# Patient Record
Sex: MALE | Race: OTHER | NOT HISPANIC OR LATINO | Employment: FULL TIME | URBAN - METROPOLITAN AREA
[De-identification: names, ages, dates, MRNs, and addresses within clinical notes are randomized per-mention and may not be internally consistent; named-entity substitution may affect disease eponyms.]

---

## 2023-09-11 ENCOUNTER — HOSPITAL ENCOUNTER (INPATIENT)
Facility: HOSPITAL | Age: 63
LOS: 9 days | Discharge: HOME | DRG: 280 | End: 2023-09-20
Attending: EMERGENCY MEDICINE | Admitting: INTERNAL MEDICINE

## 2023-09-11 DIAGNOSIS — R79.89 POSITIVE D DIMER: ICD-10-CM

## 2023-09-11 DIAGNOSIS — R07.9 CHEST PAIN: ICD-10-CM

## 2023-09-11 DIAGNOSIS — N18.6 ESRD (END STAGE RENAL DISEASE) ON DIALYSIS: ICD-10-CM

## 2023-09-11 DIAGNOSIS — I16.1 HYPERTENSIVE EMERGENCY: ICD-10-CM

## 2023-09-11 DIAGNOSIS — N17.9 AKI (ACUTE KIDNEY INJURY): Primary | ICD-10-CM

## 2023-09-11 DIAGNOSIS — Z99.2 ESRD (END STAGE RENAL DISEASE) ON DIALYSIS: ICD-10-CM

## 2023-09-11 DIAGNOSIS — I50.9 CHF (CONGESTIVE HEART FAILURE): ICD-10-CM

## 2023-09-11 DIAGNOSIS — R06.02 SOB (SHORTNESS OF BREATH): ICD-10-CM

## 2023-09-11 LAB
A-ADO2 BLOOD GAS (OHS): 55 MMHG
ALBUMIN SERPL-MCNC: 2.6 G/DL (ref 3.4–4.8)
ALBUMIN SERPL-MCNC: 3.1 G/DL (ref 3.4–4.8)
ALBUMIN/GLOB SERPL: 0.8 RATIO (ref 1.1–2)
ALBUMIN/GLOB SERPL: 0.8 RATIO (ref 1.1–2)
ALLENS TEST BLOOD GAS (OHS): YES
ALP SERPL-CCNC: 58 UNIT/L (ref 40–150)
ALP SERPL-CCNC: 68 UNIT/L (ref 40–150)
ALT SERPL-CCNC: 37 UNIT/L (ref 0–55)
ALT SERPL-CCNC: 48 UNIT/L (ref 0–55)
AMPHET UR QL SCN: NEGATIVE
APPEARANCE UR: CLEAR
APTT PPP: 31.3 SECONDS (ref 23.2–33.7)
AST SERPL-CCNC: 24 UNIT/L (ref 5–34)
AST SERPL-CCNC: 37 UNIT/L (ref 5–34)
AV INDEX (PROSTH): 0.64
AV MEAN GRADIENT: 5 MMHG
AV PEAK GRADIENT: 10 MMHG
AV VALVE AREA BY VELOCITY RATIO: 2.14 CM²
AV VALVE AREA: 1.88 CM²
AV VELOCITY RATIO: 0.73
BACTERIA #/AREA URNS AUTO: ABNORMAL /HPF
BARBITURATE SCN PRESENT UR: NEGATIVE
BASE EXCESS BLD CALC-SCNC: -9.6 MMOL/L (ref -2–2)
BASOPHILS # BLD AUTO: 0.02 X10(3)/MCL
BASOPHILS # BLD AUTO: 0.04 X10(3)/MCL
BASOPHILS NFR BLD AUTO: 0.3 %
BASOPHILS NFR BLD AUTO: 0.5 %
BENZODIAZ UR QL SCN: NEGATIVE
BILIRUB SERPL-MCNC: 0.4 MG/DL
BILIRUB SERPL-MCNC: 0.4 MG/DL
BILIRUB UR QL STRIP.AUTO: NEGATIVE
BLOOD GAS SAMPLE TYPE (OHS): ABNORMAL
BNP BLD-MCNC: 1656.2 PG/ML
BSA FOR ECHO PROCEDURE: 1.5 M2
BUN SERPL-MCNC: 102.3 MG/DL (ref 8.4–25.7)
BUN SERPL-MCNC: 104 MG/DL (ref 8.4–25.7)
C3 SERPL-MCNC: 88 MG/DL (ref 80–173)
C4 SERPL-MCNC: 38 MG/DL (ref 13–46)
CALCIUM SERPL-MCNC: 7.5 MG/DL (ref 8.8–10)
CALCIUM SERPL-MCNC: 7.8 MG/DL (ref 8.8–10)
CANNABINOIDS UR QL SCN: NEGATIVE
CHLORIDE SERPL-SCNC: 108 MMOL/L (ref 98–107)
CHLORIDE SERPL-SCNC: 108 MMOL/L (ref 98–107)
CO2 BLDA-SCNC: 15.7 MMOL/L (ref 22–26)
CO2 SERPL-SCNC: 14 MMOL/L (ref 23–31)
CO2 SERPL-SCNC: 15 MMOL/L (ref 23–31)
COCAINE UR QL SCN: NEGATIVE
COHGB MFR BLDA: 1.6 % (ref 0.5–1.5)
COLOR UR: COLORLESS
CREAT SERPL-MCNC: 12.11 MG/DL (ref 0.73–1.18)
CREAT SERPL-MCNC: 12.27 MG/DL (ref 0.73–1.18)
CREAT UR-MCNC: 64.8 MG/DL (ref 63–166)
CV ECHO LV RWT: 0.43 CM
D DIMER PPP IA.FEU-MCNC: 1.83 UG/ML FEU (ref 0–0.5)
DOP CALC AO PEAK VEL: 1.6 M/S
DOP CALC AO VTI: 28 CM
DOP CALC LVOT AREA: 2.9 CM2
DOP CALC LVOT DIAMETER: 1.93 CM
DOP CALC LVOT PEAK VEL: 1.17 M/S
DOP CALC LVOT STROKE VOLUME: 52.63 CM3
DOP CALC MV VTI: 17.2 CM
DOP CALCLVOT PEAK VEL VTI: 18 CM
DRAWN BY BLOOD GAS (OHS): ABNORMAL
E WAVE DECELERATION TIME: 65.17 MSEC
E/A RATIO: 1
E/E' RATIO: 17.86 M/S
ECHO LV POSTERIOR WALL: 0.99 CM (ref 0.6–1.1)
EOSINOPHIL # BLD AUTO: 0.01 X10(3)/MCL (ref 0–0.9)
EOSINOPHIL # BLD AUTO: 0.03 X10(3)/MCL (ref 0–0.9)
EOSINOPHIL NFR BLD AUTO: 0.1 %
EOSINOPHIL NFR BLD AUTO: 0.5 %
ERYTHROCYTE [DISTWIDTH] IN BLOOD BY AUTOMATED COUNT: 14.6 % (ref 11.5–17)
ERYTHROCYTE [DISTWIDTH] IN BLOOD BY AUTOMATED COUNT: 14.6 % (ref 11.5–17)
FENTANYL UR QL SCN: NEGATIVE
FERRITIN SERPL-MCNC: 487.55 NG/ML (ref 21.81–274.66)
FIO2 BLOOD GAS (OHS): 21 %
FLUAV AG UPPER RESP QL IA.RAPID: NOT DETECTED
FLUBV AG UPPER RESP QL IA.RAPID: NOT DETECTED
FRACTIONAL SHORTENING: 18 % (ref 28–44)
GFR SERPLBLD CREATININE-BSD FMLA CKD-EPI: 4 MLS/MIN/1.73/M2
GFR SERPLBLD CREATININE-BSD FMLA CKD-EPI: 4 MLS/MIN/1.73/M2
GLOBULIN SER-MCNC: 3.4 GM/DL (ref 2.4–3.5)
GLOBULIN SER-MCNC: 4 GM/DL (ref 2.4–3.5)
GLUCOSE SERPL-MCNC: 106 MG/DL (ref 82–115)
GLUCOSE SERPL-MCNC: 116 MG/DL (ref 82–115)
GLUCOSE UR QL STRIP.AUTO: ABNORMAL
HAPTOGLOB SERPL-MCNC: 204 MG/DL (ref 40–368)
HCO3 BLDA-SCNC: 14.9 MMOL/L (ref 22–26)
HCT VFR BLD AUTO: 28.6 % (ref 42–52)
HCT VFR BLD AUTO: 31.4 % (ref 42–52)
HGB BLD-MCNC: 9.1 G/DL (ref 14–18)
HGB BLD-MCNC: 9.8 G/DL (ref 14–18)
HYALINE CASTS #/AREA URNS LPF: ABNORMAL /LPF
IMM GRANULOCYTES # BLD AUTO: 0.02 X10(3)/MCL (ref 0–0.04)
IMM GRANULOCYTES # BLD AUTO: 0.03 X10(3)/MCL (ref 0–0.04)
IMM GRANULOCYTES NFR BLD AUTO: 0.3 %
IMM GRANULOCYTES NFR BLD AUTO: 0.4 %
INR PPP: 1.1
INTERVENTRICULAR SEPTUM: 1.11 CM (ref 0.6–1.1)
IRON SATN MFR SERPL: 32 % (ref 20–50)
IRON SERPL-MCNC: 63 UG/DL (ref 65–175)
KETONES UR QL STRIP.AUTO: NEGATIVE
LACTATE SERPL-SCNC: 0.9 MMOL/L (ref 0.5–2.2)
LEFT ATRIUM SIZE: 4.66 CM
LEFT INTERNAL DIMENSION IN SYSTOLE: 3.79 CM (ref 2.1–4)
LEFT VENTRICLE DIASTOLIC VOLUME INDEX: 66.42 ML/M2
LEFT VENTRICLE DIASTOLIC VOLUME: 99.63 ML
LEFT VENTRICLE MASS INDEX: 115 G/M2
LEFT VENTRICLE SYSTOLIC VOLUME INDEX: 40.9 ML/M2
LEFT VENTRICLE SYSTOLIC VOLUME: 61.38 ML
LEFT VENTRICULAR INTERNAL DIMENSION IN DIASTOLE: 4.65 CM (ref 3.5–6)
LEFT VENTRICULAR MASS: 172.83 G
LEUKOCYTE ESTERASE UR QL STRIP.AUTO: NEGATIVE
LV LATERAL E/E' RATIO: 15.63 M/S
LV SEPTAL E/E' RATIO: 20.83 M/S
LVOT MG: 3.29 MMHG
LVOT MV: 0.88 CM/S
LYMPHOCYTES # BLD AUTO: 0.98 X10(3)/MCL (ref 0.6–4.6)
LYMPHOCYTES # BLD AUTO: 1.06 X10(3)/MCL (ref 0.6–4.6)
LYMPHOCYTES NFR BLD AUTO: 11.8 %
LYMPHOCYTES NFR BLD AUTO: 15.9 %
MAGNESIUM SERPL-MCNC: 1.9 MG/DL (ref 1.6–2.6)
MCH RBC QN AUTO: 24.1 PG (ref 27–31)
MCH RBC QN AUTO: 24.4 PG (ref 27–31)
MCHC RBC AUTO-ENTMCNC: 31.2 G/DL (ref 33–36)
MCHC RBC AUTO-ENTMCNC: 31.8 G/DL (ref 33–36)
MCV RBC AUTO: 76.7 FL (ref 80–94)
MCV RBC AUTO: 77.3 FL (ref 80–94)
MDMA UR QL SCN: NEGATIVE
METHGB MFR BLDA: 0.3 % (ref 0–1.5)
MONOCYTES # BLD AUTO: 0.42 X10(3)/MCL (ref 0.1–1.3)
MONOCYTES # BLD AUTO: 0.43 X10(3)/MCL (ref 0.1–1.3)
MONOCYTES NFR BLD AUTO: 5 %
MONOCYTES NFR BLD AUTO: 6.5 %
MV MEAN GRADIENT: 4 MMHG
MV PEAK A VEL: 1.25 M/S
MV PEAK E VEL: 1.25 M/S
MV PEAK GRADIENT: 6 MMHG
MV STENOSIS PRESSURE HALF TIME: 18.9 MS
MV VALVE AREA BY CONTINUITY EQUATION: 3.06 CM2
MV VALVE AREA P 1/2 METHOD: 11.64 CM2
NEUTROPHILS # BLD AUTO: 5.09 X10(3)/MCL (ref 2.1–9.2)
NEUTROPHILS # BLD AUTO: 6.86 X10(3)/MCL (ref 2.1–9.2)
NEUTROPHILS NFR BLD AUTO: 76.5 %
NEUTROPHILS NFR BLD AUTO: 82.2 %
NITRITE UR QL STRIP.AUTO: NEGATIVE
NRBC BLD AUTO-RTO: 0 %
NRBC BLD AUTO-RTO: 0 %
O2 HB BLOOD GAS (OHS): 91.6 % (ref 94–100)
OHS LV EJECTION FRACTION SIMPSONS BIPLANE MOD: 45 %
OPIATES UR QL SCN: NEGATIVE
OXYHGB MFR BLDA: 8.7 G/DL (ref 12–18)
PAO2 BLOOD GAS (OHS): 116 MMHG
PCO2 BLDA: 27 MMHG (ref 35–45)
PCP UR QL: NEGATIVE
PH BLDA: 7.35 [PH] (ref 7.35–7.45)
PH UR STRIP.AUTO: 6 [PH]
PH UR: 6 [PH] (ref 3–11)
PHOSPHATE SERPL-MCNC: 7.1 MG/DL (ref 2.3–4.7)
PISA TR MAX VEL: 4.35 M/S
PLATELET # BLD AUTO: 196 X10(3)/MCL (ref 130–400)
PLATELET # BLD AUTO: 229 X10(3)/MCL (ref 130–400)
PMV BLD AUTO: 9.4 FL (ref 7.4–10.4)
PMV BLD AUTO: 9.6 FL (ref 7.4–10.4)
PO2 BLDA: 61 MMHG (ref 75–100)
POTASSIUM SERPL-SCNC: 5.1 MMOL/L (ref 3.5–5.1)
POTASSIUM SERPL-SCNC: 5.4 MMOL/L (ref 3.5–5.1)
PROT SERPL-MCNC: 6 GM/DL (ref 5.8–7.6)
PROT SERPL-MCNC: 7.1 GM/DL (ref 5.8–7.6)
PROT UR QL STRIP.AUTO: ABNORMAL
PROT UR STRIP-MCNC: 407.8 MG/DL
PROTHROMBIN TIME: 13.5 SECONDS (ref 11.4–14)
RA PRESSURE ESTIMATED: 3 MMHG
RBC # BLD AUTO: 3.73 X10(6)/MCL (ref 4.7–6.1)
RBC # BLD AUTO: 4.06 X10(6)/MCL (ref 4.7–6.1)
RBC #/AREA URNS AUTO: ABNORMAL /HPF
RBC UR QL AUTO: ABNORMAL
RET# (OHS): 0.05 (ref 0.03–0.1)
RETICULOCYTE COUNT AUTOMATED (OLG): 1.16 % (ref 1.1–2.1)
RIGHT VENTRICULAR END-DIASTOLIC DIMENSION: 2.33 CM
RV TB RVSP: 7 MMHG
SAMPLE SITE BLOOD GAS (OHS): ABNORMAL
SAO2 % BLDA: 93.5 %
SARS-COV-2 RNA RESP QL NAA+PROBE: NOT DETECTED
SODIUM SERPL-SCNC: 137 MMOL/L (ref 136–145)
SODIUM SERPL-SCNC: 139 MMOL/L (ref 136–145)
SODIUM UR-SCNC: 51 MMOL/L
SP GR UR STRIP.AUTO: 1.01 (ref 1–1.03)
SQUAMOUS #/AREA URNS LPF: ABNORMAL /HPF
TDI LATERAL: 0.08 M/S
TDI SEPTAL: 0.06 M/S
TDI: 0.07 M/S
TIBC SERPL-MCNC: 134 UG/DL (ref 69–240)
TIBC SERPL-MCNC: 197 UG/DL (ref 250–450)
TR MAX PG: 76 MMHG
TRANSFERRIN SERPL-MCNC: 167 MG/DL (ref 174–364)
TROPONIN I SERPL-MCNC: 0.3 NG/ML (ref 0–0.04)
TROPONIN I SERPL-MCNC: 0.3 NG/ML (ref 0–0.04)
TSH SERPL-ACNC: 1.82 UIU/ML (ref 0.35–4.94)
TV REST PULMONARY ARTERY PRESSURE: 79 MMHG
UNIDENT CRYS #/AREA URNS HPF: ABNORMAL /HPF
URATE SERPL-MCNC: 9.1 MG/DL (ref 3.5–7.2)
URINE PROTEIN/CREATININE RATIO (OHS): 6.3
UROBILINOGEN UR STRIP-ACNC: NORMAL
WBC # SPEC AUTO: 6.65 X10(3)/MCL (ref 4.5–11.5)
WBC # SPEC AUTO: 8.34 X10(3)/MCL (ref 4.5–11.5)
WBC #/AREA URNS AUTO: ABNORMAL /HPF
Z-SCORE OF LEFT VENTRICULAR DIMENSION IN END DIASTOLE: 0.48
Z-SCORE OF LEFT VENTRICULAR DIMENSION IN END SYSTOLE: 2.53

## 2023-09-11 PROCEDURE — 63600175 PHARM REV CODE 636 W HCPCS

## 2023-09-11 PROCEDURE — 25000003 PHARM REV CODE 250

## 2023-09-11 PROCEDURE — 86039 ANTINUCLEAR ANTIBODIES (ANA): CPT | Performed by: INTERNAL MEDICINE

## 2023-09-11 PROCEDURE — 84443 ASSAY THYROID STIM HORMONE: CPT

## 2023-09-11 PROCEDURE — 84550 ASSAY OF BLOOD/URIC ACID: CPT | Performed by: INTERNAL MEDICINE

## 2023-09-11 PROCEDURE — 99900035 HC TECH TIME PER 15 MIN (STAT)

## 2023-09-11 PROCEDURE — 85025 COMPLETE CBC W/AUTO DIFF WBC: CPT | Performed by: PHYSICIAN ASSISTANT

## 2023-09-11 PROCEDURE — 83735 ASSAY OF MAGNESIUM: CPT

## 2023-09-11 PROCEDURE — 0240U COVID/FLU A&B PCR: CPT | Performed by: PHYSICIAN ASSISTANT

## 2023-09-11 PROCEDURE — 20000000 HC ICU ROOM

## 2023-09-11 PROCEDURE — 36600 WITHDRAWAL OF ARTERIAL BLOOD: CPT

## 2023-09-11 PROCEDURE — 85610 PROTHROMBIN TIME: CPT

## 2023-09-11 PROCEDURE — 25000003 PHARM REV CODE 250: Performed by: STUDENT IN AN ORGANIZED HEALTH CARE EDUCATION/TRAINING PROGRAM

## 2023-09-11 PROCEDURE — 85025 COMPLETE CBC W/AUTO DIFF WBC: CPT

## 2023-09-11 PROCEDURE — 81001 URINALYSIS AUTO W/SCOPE: CPT | Performed by: INTERNAL MEDICINE

## 2023-09-11 PROCEDURE — 85730 THROMBOPLASTIN TIME PARTIAL: CPT

## 2023-09-11 PROCEDURE — 86036 ANCA SCREEN EACH ANTIBODY: CPT | Performed by: INTERNAL MEDICINE

## 2023-09-11 PROCEDURE — 83010 ASSAY OF HAPTOGLOBIN QUANT: CPT | Performed by: INTERNAL MEDICINE

## 2023-09-11 PROCEDURE — 85045 AUTOMATED RETICULOCYTE COUNT: CPT

## 2023-09-11 PROCEDURE — 82728 ASSAY OF FERRITIN: CPT

## 2023-09-11 PROCEDURE — 82570 ASSAY OF URINE CREATININE: CPT | Performed by: INTERNAL MEDICINE

## 2023-09-11 PROCEDURE — 85379 FIBRIN DEGRADATION QUANT: CPT | Performed by: PHYSICIAN ASSISTANT

## 2023-09-11 PROCEDURE — 83550 IRON BINDING TEST: CPT

## 2023-09-11 PROCEDURE — 25000003 PHARM REV CODE 250: Performed by: INTERNAL MEDICINE

## 2023-09-11 PROCEDURE — 83880 ASSAY OF NATRIURETIC PEPTIDE: CPT | Performed by: PHYSICIAN ASSISTANT

## 2023-09-11 PROCEDURE — 84484 ASSAY OF TROPONIN QUANT: CPT | Performed by: PHYSICIAN ASSISTANT

## 2023-09-11 PROCEDURE — 82803 BLOOD GASES ANY COMBINATION: CPT

## 2023-09-11 PROCEDURE — 80053 COMPREHEN METABOLIC PANEL: CPT | Performed by: PHYSICIAN ASSISTANT

## 2023-09-11 PROCEDURE — 80307 DRUG TEST PRSMV CHEM ANLYZR: CPT | Performed by: PHYSICIAN ASSISTANT

## 2023-09-11 PROCEDURE — 93005 ELECTROCARDIOGRAM TRACING: CPT

## 2023-09-11 PROCEDURE — 80053 COMPREHEN METABOLIC PANEL: CPT | Performed by: INTERNAL MEDICINE

## 2023-09-11 PROCEDURE — 84100 ASSAY OF PHOSPHORUS: CPT

## 2023-09-11 PROCEDURE — 84300 ASSAY OF URINE SODIUM: CPT | Performed by: INTERNAL MEDICINE

## 2023-09-11 PROCEDURE — 86160 COMPLEMENT ANTIGEN: CPT | Performed by: INTERNAL MEDICINE

## 2023-09-11 PROCEDURE — 85730 THROMBOPLASTIN TIME PARTIAL: CPT | Performed by: INTERNAL MEDICINE

## 2023-09-11 PROCEDURE — 84484 ASSAY OF TROPONIN QUANT: CPT

## 2023-09-11 PROCEDURE — S5010 5% DEXTROSE AND 0.45% SALINE: HCPCS

## 2023-09-11 PROCEDURE — 99285 EMERGENCY DEPT VISIT HI MDM: CPT | Mod: 25

## 2023-09-11 PROCEDURE — 25000003 PHARM REV CODE 250: Performed by: EMERGENCY MEDICINE

## 2023-09-11 PROCEDURE — 25000003 PHARM REV CODE 250: Performed by: PHYSICIAN ASSISTANT

## 2023-09-11 PROCEDURE — 83605 ASSAY OF LACTIC ACID: CPT

## 2023-09-11 RX ORDER — IBUPROFEN 200 MG
24 TABLET ORAL
Status: DISCONTINUED | OUTPATIENT
Start: 2023-09-11 | End: 2023-09-20 | Stop reason: HOSPADM

## 2023-09-11 RX ORDER — SODIUM CHLORIDE 0.9 % (FLUSH) 0.9 %
10 SYRINGE (ML) INJECTION EVERY 12 HOURS PRN
Status: DISCONTINUED | OUTPATIENT
Start: 2023-09-11 | End: 2023-09-20 | Stop reason: HOSPADM

## 2023-09-11 RX ORDER — CARVEDILOL 3.12 MG/1
6.25 TABLET ORAL 2 TIMES DAILY
Status: DISCONTINUED | OUTPATIENT
Start: 2023-09-11 | End: 2023-09-11

## 2023-09-11 RX ORDER — SUCRALFATE 1 G/10ML
1 SUSPENSION ORAL EVERY 6 HOURS
Status: DISCONTINUED | OUTPATIENT
Start: 2023-09-11 | End: 2023-09-11

## 2023-09-11 RX ORDER — IBUPROFEN 200 MG
16 TABLET ORAL
Status: DISCONTINUED | OUTPATIENT
Start: 2023-09-11 | End: 2023-09-20 | Stop reason: HOSPADM

## 2023-09-11 RX ORDER — GLUCAGON 1 MG
1 KIT INJECTION
Status: DISCONTINUED | OUTPATIENT
Start: 2023-09-11 | End: 2023-09-20 | Stop reason: HOSPADM

## 2023-09-11 RX ORDER — ASPIRIN 325 MG
325 TABLET ORAL
Status: COMPLETED | OUTPATIENT
Start: 2023-09-11 | End: 2023-09-11

## 2023-09-11 RX ORDER — NAPROXEN SODIUM 220 MG/1
81 TABLET, FILM COATED ORAL DAILY
Status: DISCONTINUED | OUTPATIENT
Start: 2023-09-12 | End: 2023-09-20 | Stop reason: HOSPADM

## 2023-09-11 RX ORDER — MAG HYDROX/ALUMINUM HYD/SIMETH 200-200-20
30 SUSPENSION, ORAL (FINAL DOSE FORM) ORAL
Status: DISCONTINUED | OUTPATIENT
Start: 2023-09-11 | End: 2023-09-11

## 2023-09-11 RX ORDER — MUPIROCIN 20 MG/G
OINTMENT TOPICAL 2 TIMES DAILY
Status: DISPENSED | OUTPATIENT
Start: 2023-09-11 | End: 2023-09-16

## 2023-09-11 RX ORDER — NALOXONE HCL 0.4 MG/ML
0.02 VIAL (ML) INJECTION
Status: DISCONTINUED | OUTPATIENT
Start: 2023-09-11 | End: 2023-09-20 | Stop reason: HOSPADM

## 2023-09-11 RX ORDER — CARVEDILOL 12.5 MG/1
12.5 TABLET ORAL 2 TIMES DAILY
Status: DISCONTINUED | OUTPATIENT
Start: 2023-09-11 | End: 2023-09-13

## 2023-09-11 RX ORDER — HEPARIN SODIUM,PORCINE/D5W 25000/250
0-40 INTRAVENOUS SOLUTION INTRAVENOUS CONTINUOUS
Status: DISCONTINUED | OUTPATIENT
Start: 2023-09-11 | End: 2023-09-12

## 2023-09-11 RX ORDER — NICARDIPINE HYDROCHLORIDE 0.2 MG/ML
0-15 INJECTION INTRAVENOUS CONTINUOUS
Status: DISCONTINUED | OUTPATIENT
Start: 2023-09-11 | End: 2023-09-13

## 2023-09-11 RX ORDER — PANTOPRAZOLE SODIUM 40 MG/1
40 TABLET, DELAYED RELEASE ORAL NIGHTLY
Status: DISCONTINUED | OUTPATIENT
Start: 2023-09-11 | End: 2023-09-20 | Stop reason: HOSPADM

## 2023-09-11 RX ORDER — NITROGLYCERIN 20 MG/100ML
0-400 INJECTION INTRAVENOUS CONTINUOUS
Status: DISCONTINUED | OUTPATIENT
Start: 2023-09-11 | End: 2023-09-11

## 2023-09-11 RX ADMIN — SODIUM ZIRCONIUM CYCLOSILICATE 10 G: 10 POWDER, FOR SUSPENSION ORAL at 03:09

## 2023-09-11 RX ADMIN — NICARDIPINE HYDROCHLORIDE 5 MG/HR: 0.2 INJECTION, SOLUTION INTRAVENOUS at 09:09

## 2023-09-11 RX ADMIN — PANTOPRAZOLE SODIUM 40 MG: 40 TABLET, DELAYED RELEASE ORAL at 09:09

## 2023-09-11 RX ADMIN — HEPARIN SODIUM 12 UNITS/KG/HR: 10000 INJECTION, SOLUTION INTRAVENOUS at 05:09

## 2023-09-11 RX ADMIN — NITROGLYCERIN 1 INCH: 20 OINTMENT TOPICAL at 01:09

## 2023-09-11 RX ADMIN — SODIUM BICARBONATE: 84 INJECTION, SOLUTION INTRAVENOUS at 04:09

## 2023-09-11 RX ADMIN — ASPIRIN 325 MG ORAL TABLET 325 MG: 325 PILL ORAL at 01:09

## 2023-09-11 RX ADMIN — NITROGLYCERIN 10 MCG/MIN: 20 INJECTION INTRAVENOUS at 04:09

## 2023-09-11 RX ADMIN — MUPIROCIN: 20 OINTMENT TOPICAL at 09:09

## 2023-09-11 NOTE — CONSULTS
Nephrology Consultation    Date of Consultation: 9/11/2023    Consultation Requested By: Austin Giraldo DO    Reason for Consultation: Acute renal failure    History of Present Illness:  This is a 62 y.o. male with no known past medical history, who presents to the ED with progressively worsening shortness of breath. It has been worsening over the past few days with chest pain. Endorses orthopnea with sputum production. Denies any dysuria/hematuria, and produces adequate non-tea colored urine. Denies taking any nephrotoxic agents or any illicit drug use. Nephrology consulted for acute renal failure with BUN/Cr 102/12.3.    Review of patient's allergies indicates:  No Known Allergies    Review of systems: 12 point review of systems conducted, negative except as stated in the HPI.     Past Medical History: History reviewed. No pertinent past medical history.    Procedure History: History reviewed. No pertinent surgical history.    Family History: family history is not on file.    Social History:  reports that he has never smoked. He has never used smokeless tobacco. He reports that he does not drink alcohol and does not use drugs.    Home Medications:   (Not in a hospital admission)      Inpatient Medications:     Current Facility-Administered Medications:     nitroGLYCERIN in 5 % dextrose 50 mg/250 mL (200 mcg/mL) infusion, 0-400 mcg/min, Intravenous, Continuous, Austin Giraldo DO    sodium bicarbonate 100 mEq in dextrose 5 % and 0.45 % NaCl 1,000 mL infusion, , Intravenous, Continuous, Austin Giraldo DO  No current outpatient medications on file.     Laboratory Data:   Recent Results (from the past 24 hour(s))   Comprehensive Metabolic Panel    Collection Time: 09/11/23 11:49 AM   Result Value Ref Range    Sodium Level 137 136 - 145 mmol/L    Potassium Level 5.4 (H) 3.5 - 5.1 mmol/L    Chloride 108 (H) 98 - 107 mmol/L    Carbon Dioxide 14 (L) 23 - 31 mmol/L    Glucose Level 106 82 - 115 mg/dL    Blood Urea Nitrogen  102.3 (H) 8.4 - 25.7 mg/dL    Creatinine 12.11 (H) 0.73 - 1.18 mg/dL    Calcium Level Total 7.8 (L) 8.8 - 10.0 mg/dL    Protein Total 7.1 5.8 - 7.6 gm/dL    Albumin Level 3.1 (L) 3.4 - 4.8 g/dL    Globulin 4.0 (H) 2.4 - 3.5 gm/dL    Albumin/Globulin Ratio 0.8 (L) 1.1 - 2.0 ratio    Bilirubin Total 0.4 <=1.5 mg/dL    Alkaline Phosphatase 68 40 - 150 unit/L    Alanine Aminotransferase 48 0 - 55 unit/L    Aspartate Aminotransferase 37 (H) 5 - 34 unit/L    eGFR 4 mls/min/1.73/m2   BNP    Collection Time: 09/11/23 11:49 AM   Result Value Ref Range    Natriuretic Peptide 1,656.2 (H) <=100.0 pg/mL   Troponin I    Collection Time: 09/11/23 11:49 AM   Result Value Ref Range    Troponin-I 0.298 (H) 0.000 - 0.045 ng/mL   CBC with Differential    Collection Time: 09/11/23 11:49 AM   Result Value Ref Range    WBC 8.34 4.50 - 11.50 x10(3)/mcL    RBC 4.06 (L) 4.70 - 6.10 x10(6)/mcL    Hgb 9.8 (L) 14.0 - 18.0 g/dL    Hct 31.4 (L) 42.0 - 52.0 %    MCV 77.3 (L) 80.0 - 94.0 fL    MCH 24.1 (L) 27.0 - 31.0 pg    MCHC 31.2 (L) 33.0 - 36.0 g/dL    RDW 14.6 11.5 - 17.0 %    Platelet 229 130 - 400 x10(3)/mcL    MPV 9.4 7.4 - 10.4 fL    Neut % 82.2 %    Lymph % 11.8 %    Mono % 5.0 %    Eos % 0.1 %    Basophil % 0.5 %    Lymph # 0.98 0.6 - 4.6 x10(3)/mcL    Neut # 6.86 2.1 - 9.2 x10(3)/mcL    Mono # 0.42 0.1 - 1.3 x10(3)/mcL    Eos # 0.01 0 - 0.9 x10(3)/mcL    Baso # 0.04 <=0.2 x10(3)/mcL    IG# 0.03 0 - 0.04 x10(3)/mcL    IG% 0.4 %    NRBC% 0.0 %   D dimer, quantitative    Collection Time: 09/11/23 11:49 AM   Result Value Ref Range    D-Dimer 1.83 (H) 0.00 - 0.50 ug/mL FEU   Reticulocytes    Collection Time: 09/11/23 11:49 AM   Result Value Ref Range    Retic Cnt Auto 1.16 1.1 - 2.1 %    RET# 0.0478 0.026 - 0.095   Ferritin    Collection Time: 09/11/23 11:49 AM   Result Value Ref Range    Ferritin Level 487.55 (H) 21.81 - 274.66 ng/mL   Iron and TIBC    Collection Time: 09/11/23 11:49 AM   Result Value Ref Range    Iron Binding Capacity  "Unsaturated 134 69 - 240 ug/dL    Iron Level 63 (L) 65 - 175 ug/dL    Transferrin 167 (L) 174 - 364 mg/dL    Iron Binding Capacity Total 197 (L) 250 - 450 ug/dL    Iron Saturation 32 20 - 50 %   Drug Screen, Urine    Collection Time: 09/11/23  2:21 PM   Result Value Ref Range    Amphetamines, Urine Negative Negative    Barbituates, Urine Negative Negative    Benzodiazepine, Urine Negative Negative    Cannabinoids, Urine Negative Negative    Cocaine, Urine Negative Negative    Fentanyl, Urine Negative Negative    MDMA, Urine Negative Negative    Opiates, Urine Negative Negative    Phencyclidine, Urine Negative Negative    pH, Urine 6.0 3.0 - 11.0       Imaging:  X-Ray Chest 1 View   Final Result      CT Chest Without Contrast    (Results Pending)   US Doppler Renal Artery and Vein (xpd)    (Results Pending)   US Retroperitoneal Complete    (Results Pending)       Physical Exam:   BP (!) 222/128   Pulse 97   Temp 97.7 °F (36.5 °C) (Temporal)   Resp 20   Ht 5' 2" (1.575 m)   Wt 51.7 kg (113 lb 15.7 oz)   SpO2 96%   BMI 20.85 kg/m²  Body mass index is 20.85 kg/m².  Physical Exam  Vitals reviewed.   Constitutional:       General: He is in acute distress.      Appearance: He is not ill-appearing or toxic-appearing.   HENT:      Mouth/Throat:      Mouth: Mucous membranes are moist.   Eyes:      Extraocular Movements: Extraocular movements intact.   Cardiovascular:      Rate and Rhythm: Tachycardia present.      Pulses: Normal pulses.      Heart sounds: Normal heart sounds. No murmur heard.     No friction rub. No gallop.   Pulmonary:      Breath sounds: Normal breath sounds.      Comments: tachypneic  Abdominal:      General: There is no distension.      Palpations: Abdomen is soft. There is no mass.      Tenderness: There is no abdominal tenderness.   Musculoskeletal:         General: Normal range of motion.      Right lower leg: No edema.      Left lower leg: No edema.   Skin:     General: Skin is warm and dry.    " "  Capillary Refill: Capillary refill takes less than 2 seconds.      Coloration: Skin is not jaundiced or pale.      Findings: No bruising.   Neurological:      General: No focal deficit present.      Mental Status: He is alert.   Psychiatric:         Mood and Affect: Mood normal.         Behavior: Behavior normal.         Thought Content: Thought content normal.         Judgment: Judgment normal.           Impression/Plan   Nonoliguric Acute renal failure   Anion gap metabolic acidosis  -Without prior kidney disease, likely has above. Now has   -He will need a full workup due to no known history of kidney disease. Will need UA, Urine Protein/Cr ratio, Urine sodium, Urine osmoles, JAMES/ANCA, C3/C4, ANCA, SPEP, UPEP.   -Recommend renal US artery/vein for secondary workup of hypertension  -Patient requested medical management at this time. Recommend giving fluids 1/2 NS + 2 amps bicarb @ 100 cc/hr. Will repeat bmp @ 7 PM to look for improvement. If renal indices do not improve or patient becomes fluid overloaded, patient will need HD, which he was agreeable to. Please contact Dr. Hyman if emergent dialysis is needed.    3.   Hypertensive emergency  4.   Congestive heart failure  5.   NSTEMI  -Rest of management per primary team      Thank you very much for your consultation. Nephrology will continue to follow. Please do not hesitate to reach out with any questions/concerns.    Attending attestation to follow.    Eliezer Avila DO  [facility] Internal Medicine, PGY-II  [facility] Nephrology    Addendum:  Patient will likely require hemodialysis due to pulmonary edema and degree of acidosis and azotemia.  May need labetalol for hypertensive emergency. Patient on nitroglycerin gtt. Cardene gtt also an option.Work up for BIA underway. Renal US-no hydronephrosis.    During our interview with patient earlier in ER, via , patient said he would rather "medication" than hemodialysis, but will consider Only if absolutely " necessary. He wants to talk to family. Explained risks vs benefits of hemodialysis and patient expressed understanding.  Brittany Hyman M.D.  Nephrology

## 2023-09-11 NOTE — CONSULTS
"Cardiology Consult Note     Cardiology Attending: Dr. Banuelos   Cardiology Fellow: Lizeth Daugherty DO     Date of Admit: 9/11/2023  Date of Consult: 9/11/2023    Reason for Consultation:     "NSTEMI"    History of Present Illness:      Ryann Ly is a 62 y.o. male with a PMH significant for HTN who presented to the [facility] ED with complaints of worsening SOB. Patient reports having progressively worsening SOB over the last week. He reports SOB is worse with exertion and lying flat. Reports significant orthopnea and PND. Patient also had an episode of nausea with this morning. He reports last being seen by a physician 1 year ago and states he had blood work at that time. He denies being told of any CKD. Cardiology has been consulted for NSTEMI.     Past Medical History:   HTN  Surgical History:   History reviewed. No pertinent surgical history.  Allergies:   Review of patient's allergies indicates:  No Known Allergies  Family History:   History reviewed. No pertinent family history.  Social History:     Social History     Tobacco Use    Smoking status: Never    Smokeless tobacco: Never   Substance Use Topics    Alcohol use: Never    Drug use: Never     Review of Systems:     Review of Systems   Constitutional:  Negative for chills, diaphoresis and fever.   HENT:  Negative for hearing loss and nosebleeds.    Eyes:  Negative for blurred vision.   Respiratory:  Positive for shortness of breath.    Cardiovascular:  Positive for orthopnea and PND. Negative for chest pain, palpitations and claudication.   Gastrointestinal:  Negative for nausea and vomiting.   Genitourinary:  Negative for dysuria.   Musculoskeletal:  Negative for myalgias.   Skin:  Negative for rash.   Neurological:  Negative for dizziness and headaches.     Medications:     Home Medications:  Prior to Admission medications    Not on File       Current/Inpatient Medications:  Infusions:   heparin (porcine) in D5W      nitroGLYCERIN 10 mcg/min (09/11/23 " "1609)    sodium bicarbonate 100 mEq in dextrose 5 % and 0.45 % NaCl 1,000 mL infusion 100 mL/hr at 09/11/23 1607     Scheduled:   [START ON 9/12/2023] aspirin  81 mg Oral Daily    heparin (PORCINE)  60 Units/kg (Adjusted) Intravenous Once    pantoprazole  40 mg Oral QHS     PRN:  dextrose 10%, dextrose 10%, glucagon (human recombinant), glucose, glucose, heparin (PORCINE), heparin (PORCINE), naloxone, sodium chloride 0.9%    Objective:     24-hour Vitals:   Temp:  [97.7 °F (36.5 °C)] 97.7 °F (36.5 °C)  Pulse:  [90-97] 93  Resp:  [20] 20  SpO2:  [96 %] 96 %  BP: (207-230)/(122-137) 213/125    Vitals: BP (!) 213/125   Pulse 93   Temp 97.7 °F (36.5 °C) (Temporal)   Resp 20   Ht 5' 2" (1.575 m)   Wt 51.3 kg (113 lb)   SpO2 96%   BMI 20.67 kg/m²   No intake or output data in the 24 hours ending 09/11/23 1702    Physical Exam  Constitutional:       Appearance: Normal appearance.   HENT:      Head: Normocephalic and atraumatic.   Eyes:      Conjunctiva/sclera: Conjunctivae normal.   Cardiovascular:      Rate and Rhythm: Normal rate and regular rhythm.      Pulses: Normal pulses.      Heart sounds: Normal heart sounds.   Pulmonary:      Effort: Pulmonary effort is normal. No respiratory distress.      Breath sounds: Normal breath sounds.   Abdominal:      General: Bowel sounds are normal. There is no distension.      Palpations: Abdomen is soft.   Musculoskeletal:      Right lower leg: No edema.      Left lower leg: No edema.   Skin:     General: Skin is warm and dry.   Neurological:      Mental Status: He is alert. Mental status is at baseline.   Psychiatric:         Mood and Affect: Mood normal.         Thought Content: Thought content normal.         Judgment: Judgment normal.        Labs:   I have reviewed the following labs below:    Recent Labs   Lab 09/11/23  1149 09/11/23  1555 09/11/23  1556   WBC 8.34  --  6.65   HGB 9.8*  --  9.1*   HCT 31.4*  --  28.6*     --  196     --   --    K 5.4*  --   " --    CO2 14*  --   --    .3*  --   --    CREATININE 12.11*  --   --    CALCIUM 7.8*  --   --    ALBUMIN 3.1*  --   --    BILITOT 0.4  --   --    AST 37*  --   --    ALT 48  --   --    ALKPHOS 68  --   --    PTT  --  31.3  --    INR  --  1.1  --    TROPONINI 0.298*  --   --    BNP 1,656.2*  --   --      Cardiovascular Studies/Imaging:   I have reviewed the following studies below:    TTE (9/11/2023):     Left Ventricle: The left ventricle is normal in size. Normal wall thickness. There is mildly reduced systolic function. Biplane (2D) method of discs ejection fraction is 45%.    There is mild  anteroseptal hypokinesis.    Left Atrium: Left atrium is mildly dilated.    Right Ventricle: Normal right ventricular cavity size. Systolic function is normal.    Right Atrium: Right atrium is mildly dilated.    Aortic Valve: The aortic valve is a trileaflet valve. There is normal leaflet mobility.    Mitral Valve: There is trace regurgitation.    Tricuspid Valve: There is mild to moderate regurgitation.    Pulmonary Artery: The estimated pulmonary artery systolic pressure is 79 mmHg.    IVC/SVC: Normal venous pressure at 3 mmHg.         Assessment & Plan:      NSTEMI   - likely demand ischemic    - continue trending troponins for now - although unreliable in the setting of acute renal failure   - unable to perform LHC in the setting of acute renal failure    - recommend stress nuclear once patient is euvolemic and able to lie flat     HFrEF   - EF 45% on echo today    - recommend diuresis to achieve euvolemia   - will need stress testing once euvolemic    - recommend initiating GDMT when transitioning off of HTN crisis protocol     Pulmonary HTN   - will need further workup to determine etiology   - recommend V/Q scan and outpatient sleep study    - if above testing is normal - will need outpatient RHC    Thank you for allowing us to participate in the care of this patient. We will continue to follow along.     Lizeth  SHONDA Daugherty DO  [facility] Cardiology Fellow, PGY-6  09/11/2023 5:02 PM

## 2023-09-11 NOTE — FIRST PROVIDER EVALUATION
"Medical screening examination initiated.  I have conducted a focused provider triage encounter, findings are as follows:    Brief history of present illness:  Elevated BP & SOB at night    Vitals:    09/11/23 1117   BP: (!) 230/137   BP Location: Right arm   Patient Position: Sitting   Pulse: 95   Resp: 20   Temp: 97.7 °F (36.5 °C)   TempSrc: Temporal   SpO2: 96%   Weight: 51.7 kg (113 lb 15.7 oz)   Height: 5' 2" (1.575 m)       Pertinent physical exam:  NAD, no LE edema    Brief workup plan:  labs, EKG, XR ordered    Preliminary workup initiated; this workup will be continued and followed by the physician or advanced practice provider that is assigned to the patient when roomed.  "

## 2023-09-11 NOTE — ED PROVIDER NOTES
Encounter Date: 9/11/2023      I, Michele Erazo MD, did conduct a face to face encounter with this patient initially seen by our advanced practice provider. I did perform a history and physical, did direct the evaluation and management, and do agree with the care as documented.       History     Chief Complaint   Patient presents with    Hypertension     C/o high blood pressure at night when he goes to bed and is unable to sleep because he gets sob. Bp at present 230/137. Need referral for pcp     Patient reports to the ER with complaints of elevated blood pressure readings at night with assoc sob and cp; pt denies any sob/cp symptoms during the day and denies any medical history requiring prescription meds    The history is provided by the patient.   Hypertension   This is a new problem. The current episode started several days ago. The problem has been waxing and waning. Associated symptoms include chest pain and shortness of breath. Pertinent negatives include no blurred vision and no peripheral edema.     Review of patient's allergies indicates:  No Known Allergies  History reviewed. No pertinent past medical history.  History reviewed. No pertinent surgical history.  History reviewed. No pertinent family history.  Social History     Tobacco Use    Smoking status: Never    Smokeless tobacco: Never   Substance Use Topics    Alcohol use: Never    Drug use: Never     Review of Systems   Constitutional:  Negative for fever.   HENT:  Negative for sore throat.    Eyes:  Negative for blurred vision.   Respiratory:  Positive for shortness of breath.    Cardiovascular:  Positive for chest pain.   Gastrointestinal:  Negative for nausea.   Genitourinary:  Negative for dysuria.   Musculoskeletal:  Negative for back pain.   Skin:  Negative for rash.   Neurological:  Negative for weakness.   Hematological:  Does not bruise/bleed easily.   Psychiatric/Behavioral: Negative.         Physical Exam     Initial Vitals  [09/11/23 1117]   BP Pulse Resp Temp SpO2   (!) 230/137 95 20 97.7 °F (36.5 °C) 96 %      MAP       --         Physical Exam    Vitals reviewed.  Constitutional: He appears well-developed and well-nourished.   HENT:   Head: Normocephalic and atraumatic.   Eyes: Conjunctivae and EOM are normal. Pupils are equal, round, and reactive to light.   Neck:   Normal range of motion.  Cardiovascular:  Normal rate, regular rhythm, normal heart sounds and intact distal pulses.           Pulmonary/Chest: He has rhonchi. He exhibits no tenderness.   Abdominal: Abdomen is soft. Bowel sounds are normal. He exhibits no distension. There is no abdominal tenderness.   Musculoskeletal:         General: Normal range of motion.      Cervical back: Normal range of motion.     Neurological: He is alert and oriented to person, place, and time. He displays normal reflexes. No cranial nerve deficit or sensory deficit. GCS score is 15. GCS eye subscore is 4. GCS verbal subscore is 5. GCS motor subscore is 6.   Skin: Skin is warm. No pallor.   Psychiatric: He has a normal mood and affect. His behavior is normal. Judgment and thought content normal.         ED Course   Procedures  Labs Reviewed   COMPREHENSIVE METABOLIC PANEL - Abnormal; Notable for the following components:       Result Value    Potassium Level 5.4 (*)     Chloride 108 (*)     Carbon Dioxide 14 (*)     Blood Urea Nitrogen 102.3 (*)     Creatinine 12.11 (*)     Calcium Level Total 7.8 (*)     Albumin Level 3.1 (*)     Globulin 4.0 (*)     Albumin/Globulin Ratio 0.8 (*)     Aspartate Aminotransferase 37 (*)     All other components within normal limits   B-TYPE NATRIURETIC PEPTIDE - Abnormal; Notable for the following components:    Natriuretic Peptide 1,656.2 (*)     All other components within normal limits   TROPONIN I - Abnormal; Notable for the following components:    Troponin-I 0.298 (*)     All other components within normal limits   CBC WITH DIFFERENTIAL - Abnormal;  Notable for the following components:    RBC 4.06 (*)     Hgb 9.8 (*)     Hct 31.4 (*)     MCV 77.3 (*)     MCH 24.1 (*)     MCHC 31.2 (*)     All other components within normal limits   D DIMER, QUANTITATIVE - Abnormal; Notable for the following components:    D-Dimer 1.83 (*)     All other components within normal limits   IRON AND TIBC - Abnormal; Notable for the following components:    Iron Level 63 (*)     Transferrin 167 (*)     Iron Binding Capacity Total 197 (*)     All other components within normal limits   RETICULOCYTES - Normal   CBC W/ AUTO DIFFERENTIAL    Narrative:     The following orders were created for panel order CBC Auto Differential.  Procedure                               Abnormality         Status                     ---------                               -----------         ------                     CBC with Differential[[id number]]       Abnormal            Final result                 Please view results for these tests on the individual orders.   EXTRA TUBES    Narrative:     The following orders were created for panel order EXTRA TUBES.  Procedure                               Abnormality         Status                     ---------                               -----------         ------                     Gold Top Hold[[id number]]                                   In process                 Pink Top Hold[[id number]]                                   In process                   Please view results for these tests on the individual orders.   GOLD TOP HOLD   PINK TOP HOLD   COVID/FLU A&B PCR   DRUG SCREEN, URINE (BEAKER)   FERRITIN        ECG Results              EKG 12-lead (In process)  Result time 09/11/23 11:37:46      In process by Interface, Lab In [facility] (09/11/23 11:37:46)                   Narrative:    Test Reason : R06.02,    Vent. Rate : 094 BPM     Atrial Rate : 094 BPM     P-R Int : 150 ms          QRS Dur : 138 ms      QT Int : 418 ms       P-R-T Axes : 635 -63 648  degrees     QTc Int : 522 ms    Normal sinus rhythm  Left axis deviation  Nonspecific intraventricular block  T wave abnormality, consider lateral ischemia  Abnormal ECG  No previous ECGs available    Referred By:             Confirmed By:                                   Imaging Results              X-Ray Chest 1 View (Final result)  Result time 09/11/23 13:33:20      Final result by Malcom Castro MD (09/11/23 13:33:20)                   Narrative:    EXAMINATION  XR CHEST 1 VIEW    CLINICAL HISTORY  sob;    TECHNIQUE  A total of 1 frontal image(s) of the chest.    COMPARISON  None available at the time of initial interpretation.    FINDINGS  Lines/tubes/devices: none present    The cardiomediastinal silhouette and central pulmonary vasculature are unremarkable for utilized technique.  The trachea is midline.  Ill-defined right perihilar and basilar infiltrate noted, with no organized lobar consolidation identified.  Left lung field is clear.  There is no large pleural effusion or convincing pneumothorax.    There is no acute osseous or extrathoracic abnormality.    IMPRESSION  Ill-defined right perihilar/basilar infiltrate may represent developing atypical infectious process.      Electronically signed by: Malcom Castro  Date:    09/11/2023  Time:    13:33                                     Medications   nitroGLYCERIN 2% TD oint ointment 1 inch (1 inch Topical (Top) Given 9/11/23 1343)   aspirin tablet 325 mg (325 mg Oral Given 9/11/23 1343)     Medical Decision Making  Amount and/or Complexity of Data Reviewed  Radiology: ordered.    Risk  OTC drugs.  Prescription drug management.  Decision regarding hospitalization.                               Clinical Impression:   Final diagnoses:  [R06.02] SOB (shortness of breath)  [I16.1] Hypertensive emergency  [N17.9] BIA (acute kidney injury) (Primary)        ED Disposition Condition    Observation Stable                Michele Erazo MD  09/17/23 0700

## 2023-09-12 LAB
ALBUMIN SERPL-MCNC: 2.6 G/DL (ref 3.4–4.8)
ALBUMIN/GLOB SERPL: 0.8 RATIO (ref 1.1–2)
ALP SERPL-CCNC: 53 UNIT/L (ref 40–150)
ALT SERPL-CCNC: 30 UNIT/L (ref 0–55)
APTT PPP: 48.7 SECONDS (ref 23.2–33.7)
APTT PPP: 67.9 SECONDS
APTT PPP: 75.6 SECONDS (ref 23.2–33.7)
AST SERPL-CCNC: 19 UNIT/L (ref 5–34)
BASOPHILS # BLD AUTO: 0.05 X10(3)/MCL
BASOPHILS NFR BLD AUTO: 0.6 %
BILIRUB SERPL-MCNC: 0.4 MG/DL
BUN SERPL-MCNC: 101.9 MG/DL (ref 8.4–25.7)
CALCIUM SERPL-MCNC: 7.1 MG/DL (ref 8.8–10)
CHLORIDE SERPL-SCNC: 109 MMOL/L (ref 98–107)
CHOLEST SERPL-MCNC: 166 MG/DL
CHOLEST/HDLC SERPL: 4 {RATIO} (ref 0–5)
CK SERPL-CCNC: 395 U/L (ref 30–200)
CO2 SERPL-SCNC: 18 MMOL/L (ref 23–31)
CREAT SERPL-MCNC: 12.05 MG/DL (ref 0.73–1.18)
EOSINOPHIL # BLD AUTO: 0.17 X10(3)/MCL (ref 0–0.9)
EOSINOPHIL NFR BLD AUTO: 2.2 %
ERYTHROCYTE [DISTWIDTH] IN BLOOD BY AUTOMATED COUNT: 14.4 % (ref 11.5–17)
EST. AVERAGE GLUCOSE BLD GHB EST-MCNC: 114 MG/DL
GFR SERPLBLD CREATININE-BSD FMLA CKD-EPI: 4 MLS/MIN/1.73/M2
GLOBULIN SER-MCNC: 3.3 GM/DL (ref 2.4–3.5)
GLUCOSE SERPL-MCNC: 102 MG/DL (ref 82–115)
HBA1C MFR BLD: 5.6 %
HBV SURFACE AB SER-ACNC: 0.33 MIU/ML
HBV SURFACE AB SERPL IA-ACNC: NONREACTIVE M[IU]/ML
HBV SURFACE AG SERPL QL IA: NONREACTIVE
HCT VFR BLD AUTO: 25.9 % (ref 42–52)
HDLC SERPL-MCNC: 43 MG/DL (ref 35–60)
HGB BLD-MCNC: 8.3 G/DL (ref 14–18)
IMM GRANULOCYTES # BLD AUTO: 0.02 X10(3)/MCL (ref 0–0.04)
IMM GRANULOCYTES NFR BLD AUTO: 0.3 %
LDLC SERPL CALC-MCNC: 114 MG/DL (ref 50–140)
LYMPHOCYTES # BLD AUTO: 1.91 X10(3)/MCL (ref 0.6–4.6)
LYMPHOCYTES NFR BLD AUTO: 24.6 %
MAGNESIUM SERPL-MCNC: 1.9 MG/DL (ref 1.6–2.6)
MCH RBC QN AUTO: 23.9 PG (ref 27–31)
MCHC RBC AUTO-ENTMCNC: 32 G/DL (ref 33–36)
MCV RBC AUTO: 74.4 FL (ref 80–94)
MONOCYTES # BLD AUTO: 0.63 X10(3)/MCL (ref 0.1–1.3)
MONOCYTES NFR BLD AUTO: 8.1 %
NEUTROPHILS # BLD AUTO: 4.99 X10(3)/MCL (ref 2.1–9.2)
NEUTROPHILS NFR BLD AUTO: 64.2 %
NRBC BLD AUTO-RTO: 0 %
PHOSPHATE SERPL-MCNC: 6.9 MG/DL (ref 2.3–4.7)
PLATELET # BLD AUTO: 203 X10(3)/MCL (ref 130–400)
PMV BLD AUTO: 9.4 FL (ref 7.4–10.4)
POTASSIUM SERPL-SCNC: 4.5 MMOL/L (ref 3.5–5.1)
PROT SERPL-MCNC: 5.9 GM/DL (ref 5.8–7.6)
RBC # BLD AUTO: 3.48 X10(6)/MCL (ref 4.7–6.1)
SODIUM SERPL-SCNC: 141 MMOL/L (ref 136–145)
TRIGL SERPL-MCNC: 47 MG/DL (ref 34–140)
TROPONIN I SERPL-MCNC: 0.23 NG/ML (ref 0–0.04)
TROPONIN I SERPL-MCNC: 0.26 NG/ML (ref 0–0.04)
VLDLC SERPL CALC-MCNC: 9 MG/DL
WBC # SPEC AUTO: 7.77 X10(3)/MCL (ref 4.5–11.5)

## 2023-09-12 PROCEDURE — 84100 ASSAY OF PHOSPHORUS: CPT

## 2023-09-12 PROCEDURE — 85025 COMPLETE CBC W/AUTO DIFF WBC: CPT

## 2023-09-12 PROCEDURE — 25000003 PHARM REV CODE 250: Performed by: STUDENT IN AN ORGANIZED HEALTH CARE EDUCATION/TRAINING PROGRAM

## 2023-09-12 PROCEDURE — 87340 HEPATITIS B SURFACE AG IA: CPT | Performed by: INTERNAL MEDICINE

## 2023-09-12 PROCEDURE — S5010 5% DEXTROSE AND 0.45% SALINE: HCPCS

## 2023-09-12 PROCEDURE — 25000003 PHARM REV CODE 250

## 2023-09-12 PROCEDURE — 86706 HEP B SURFACE ANTIBODY: CPT | Performed by: INTERNAL MEDICINE

## 2023-09-12 PROCEDURE — 25500020 PHARM REV CODE 255

## 2023-09-12 PROCEDURE — 82550 ASSAY OF CK (CPK): CPT | Performed by: INTERNAL MEDICINE

## 2023-09-12 PROCEDURE — 84484 ASSAY OF TROPONIN QUANT: CPT

## 2023-09-12 PROCEDURE — 21400001 HC TELEMETRY ROOM

## 2023-09-12 PROCEDURE — 63600175 PHARM REV CODE 636 W HCPCS: Performed by: STUDENT IN AN ORGANIZED HEALTH CARE EDUCATION/TRAINING PROGRAM

## 2023-09-12 PROCEDURE — 83735 ASSAY OF MAGNESIUM: CPT

## 2023-09-12 PROCEDURE — 25000003 PHARM REV CODE 250: Performed by: INTERNAL MEDICINE

## 2023-09-12 PROCEDURE — 83036 HEMOGLOBIN GLYCOSYLATED A1C: CPT

## 2023-09-12 PROCEDURE — 80100014 HC HEMODIALYSIS 1:1

## 2023-09-12 PROCEDURE — 80061 LIPID PANEL: CPT

## 2023-09-12 PROCEDURE — 80053 COMPREHEN METABOLIC PANEL: CPT

## 2023-09-12 PROCEDURE — 85730 THROMBOPLASTIN TIME PARTIAL: CPT | Performed by: INTERNAL MEDICINE

## 2023-09-12 PROCEDURE — 63600175 PHARM REV CODE 636 W HCPCS

## 2023-09-12 RX ORDER — HYDRALAZINE HYDROCHLORIDE 20 MG/ML
10 INJECTION INTRAMUSCULAR; INTRAVENOUS
Status: DISCONTINUED | OUTPATIENT
Start: 2023-09-12 | End: 2023-09-20 | Stop reason: HOSPADM

## 2023-09-12 RX ORDER — LABETALOL HCL 20 MG/4 ML
10 SYRINGE (ML) INTRAVENOUS
Status: DISCONTINUED | OUTPATIENT
Start: 2023-09-12 | End: 2023-09-20 | Stop reason: HOSPADM

## 2023-09-12 RX ORDER — DIPHENHYDRAMINE HCL 25 MG
25 CAPSULE ORAL ONCE
Status: COMPLETED | OUTPATIENT
Start: 2023-09-12 | End: 2023-09-12

## 2023-09-12 RX ORDER — CARVEDILOL 3.12 MG/1
6.25 TABLET ORAL 2 TIMES DAILY
Status: DISCONTINUED | OUTPATIENT
Start: 2023-09-12 | End: 2023-09-12

## 2023-09-12 RX ORDER — SODIUM CHLORIDE 9 MG/ML
INJECTION, SOLUTION INTRAVENOUS ONCE
Status: CANCELLED | OUTPATIENT
Start: 2023-09-12 | End: 2023-09-12

## 2023-09-12 RX ADMIN — ISOSORBIDE DINITRATE: 20 TABLET ORAL at 10:09

## 2023-09-12 RX ADMIN — HYDRALAZINE HYDROCHLORIDE 10 MG: 20 INJECTION INTRAMUSCULAR; INTRAVENOUS at 05:09

## 2023-09-12 RX ADMIN — NICARDIPINE HYDROCHLORIDE 2.5 MG/HR: 0.2 INJECTION, SOLUTION INTRAVENOUS at 06:09

## 2023-09-12 RX ADMIN — MUPIROCIN: 20 OINTMENT TOPICAL at 08:09

## 2023-09-12 RX ADMIN — MUPIROCIN: 20 OINTMENT TOPICAL at 10:09

## 2023-09-12 RX ADMIN — HEPARIN SODIUM 15.09 UNITS/KG/HR: 10000 INJECTION, SOLUTION INTRAVENOUS at 03:09

## 2023-09-12 RX ADMIN — IOHEXOL 100 ML: 350 INJECTION, SOLUTION INTRAVENOUS at 06:09

## 2023-09-12 RX ADMIN — CARVEDILOL 12.5 MG: 12.5 TABLET, FILM COATED ORAL at 10:09

## 2023-09-12 RX ADMIN — HYDRALAZINE HYDROCHLORIDE 10 MG: 20 INJECTION INTRAMUSCULAR; INTRAVENOUS at 01:09

## 2023-09-12 RX ADMIN — CARVEDILOL 12.5 MG: 12.5 TABLET, FILM COATED ORAL at 08:09

## 2023-09-12 RX ADMIN — PANTOPRAZOLE SODIUM 40 MG: 40 TABLET, DELAYED RELEASE ORAL at 10:09

## 2023-09-12 RX ADMIN — ASPIRIN 81 MG CHEWABLE TABLET 81 MG: 81 TABLET CHEWABLE at 08:09

## 2023-09-12 RX ADMIN — SODIUM BICARBONATE: 84 INJECTION, SOLUTION INTRAVENOUS at 03:09

## 2023-09-12 RX ADMIN — DIPHENHYDRAMINE HYDROCHLORIDE 25 MG: 25 CAPSULE ORAL at 10:09

## 2023-09-12 NOTE — PROGRESS NOTES
Inpatient Nutrition Assessment    Admit Date: 9/11/2023   Total duration of encounter: 1 day     Nutrition Recommendation/Prescription     When appropriate ADAT to renal diet--60 gm protein  Order novasource renal bid when diet progressed ; nausea or vomiting   Consider phosphate binder /elevated P level  MVI/fe  Biweekly wt  Will monitor nutrition status     Communication of Recommendations: reviewed with nurse    Nutrition Assessment     Malnutrition Assessment/Nutrition-Focused Physical Exam    Malnutrition Context: acute illness or injury  Malnutrition Level:  (pt in testing during rounds; unable to complete PA /obtain diet hx at this time)                                                        A minimum of two characteristics is recommended for diagnosis of either severe or non-severe malnutrition.    Chart Review    Reason Seen: continuous nutrition monitoring    Malnutrition Screening Tool Results   Have you recently lost weight without trying?: No  Have you been eating poorly because of a decreased appetite?: No   MST Score: 0     Diagnosis:  Acute renal failure, metabolic acidosis, HTN, Chf, NSTEMI     Relevant Medical History: none    Nutrition-Related Medications: heparin, Na Bicarbonate , aspirin, pantoprazole   Calorie Containing IV Medications: no significant kcals from medications at this time    Nutrition-Related Labs:  (9-12) H/ 8.3/25.9(L) Gluc 102 Bun 101.9(H) Cr 12.0(H) GFR 4(L) K 4.5 P 6.9(H)     Diet/PN Order: Diet NPO  Oral Supplement Order: none  Tube Feeding Order: none  Appetite/Oral Intake: NPO/NPO  Factors Affecting Nutritional Intake: NPO  Food/Muslim/Cultural Preferences: unable to obtain  Food Allergies: unable to obtain       Wound(s):   none     Comments    (9/12) Pt having ultrasound during rounds; spoke to nurse caring for pt--pt to have multiple test today. Pt to have HD access placed; agreed to dialysis. Unable to obtain diet/wt hx at this time. Suggest renal diet/ oral  "supplement when able to advance diet. Abnormal labs noted: Bun/Cr (H)--reflective renal dysfunction. P (H)--suggest phosphate binder.     Anthropometrics    Height: 5' 2.01" (157.5 cm)    Last Weight: 51.2 kg (112 lb 14 oz) (23 0540) Weight Method: Bed Scale  BMI (Calculated): 20.6  BMI Classification: normal (BMI 18.5-24.9)        Ideal Body Weight (IBW), Male: 118.06 lb     % Ideal Body Weight, Male (lb): 95.71 %                 Usual Body Weight (UBW), kg:  (unable to obtain)        Usual Weight Provided By: EMR weight history    Wt Readings from Last 5 Encounters:   23 51.2 kg (112 lb 14 oz)     Weight Change(s) Since Admission:  Admit Weight: 51.7 kg (113 lb 15.7 oz) (23 1117)      Estimated Needs    Weight Used For Calorie Calculations: 51.2 kg (112 lb 14 oz)  Energy Calorie Requirements (kcal): 1536 kcal/d; 30 coby/kg  Energy Need Method: Kcal/kg  Weight Used For Protein Calculations: 51.2 kg (112 lb 14 oz)  Protein Requirements: 61 gm protein/d; 1.2 gm/kg ARF/ to begin HD  Fluid Requirements (mL): 1536 ml/d; 1ml/coby  Temp (24hrs), Av.4 °F (36.9 °C), Min:98 °F (36.7 °C), Max:99.2 °F (37.3 °C)       Enteral Nutrition    Patient not receiving enteral nutrition at this time.    Parenteral Nutrition    Patient not receiving parenteral nutrition support at this time.    Evaluation of Received Nutrient Intake    Calories: not meeting estimated needs  Protein: not meeting estimated needs    Patient Education    Not applicable.    Nutrition Diagnosis     PES: Inadequate oral intake related to acute illness as evidenced by NPO. (new)    Interventions/Goals     Intervention(s): modified composition of meals/snacks, commercial beverage, multivitamin/mineral supplement therapy, and collaboration with other providers  Goal: Meet greater than 75% of nutritional needs by follow-up. (new)    Monitoring & Evaluation     Dietitian will monitor food and beverage intake, weight, and electrolyte/renal " panel.  Nutrition Risk/Follow-Up: moderate (follow-up in 3-5 days)   Please consult if re-assessment needed sooner.

## 2023-09-12 NOTE — PROGRESS NOTES
"Cardiology Consult Note     Cardiology Attending: Dr. Banuelos   Cardiology Fellow: Lizeth Daugherty DO     Date of Admit: 9/11/2023  Date of Consult: 9/12/2023    Reason for Consultation:     "NSTEMI"    History of Present Illness:      Ryann Ly is a 63 y.o. male with a PMH significant for HTN who presented to the [facility] ED with complaints of worsening SOB. Patient reports having progressively worsening SOB over the last week. He reports KAY and orthopnea. No history of CKD. Cardiology has been consulted for NSTEMI.     Interval History:  Patient seen and examined today. Reports improvement in SOB.     Review of Systems:     Negative except as above     Medications:     Home Medications:  Prior to Admission medications    Not on File       Current/Inpatient Medications:  Infusions:   heparin (porcine) in D5W 15.087 Units/kg/hr (09/12/23 1518)    nicardipine Stopped (09/12/23 0950)    sodium bicarbonate 100 mEq in dextrose 5 % and 0.45 % NaCl 1,000 mL infusion 100 mL/hr at 09/12/23 1514     Scheduled:   aspirin  81 mg Oral Daily    carvediloL  12.5 mg Oral BID    hydrALAZINE 10 mg 1 tablet, hydrALAZINE 25 mg 1 tablet, isorsorbide dinitrate 20 mg 1 tablet combination   Oral TID    mupirocin   Nasal BID    pantoprazole  40 mg Oral QHS     PRN:  dextrose 10%, dextrose 10%, glucagon (human recombinant), glucose, glucose, heparin (PORCINE), heparin (PORCINE), hydrALAZINE, labetalol, naloxone, sodium chloride 0.9%    Objective:     24-hour Vitals:   Temp:  [98 °F (36.7 °C)-99.2 °F (37.3 °C)] 98.2 °F (36.8 °C)  Pulse:  [73-98] 93  Resp:  [6-32] 22  SpO2:  [89 %-97 %] 94 %  BP: (134-198)/() 143/77    Vitals: BP (!) 143/77   Pulse 93   Temp 98.2 °F (36.8 °C)   Resp (!) 22   Ht 5' 2.01" (1.575 m)   Wt 51.2 kg (112 lb 14 oz)   SpO2 (!) 94%   BMI 20.64 kg/m²     Intake/Output Summary (Last 24 hours) at 9/12/2023 1645  Last data filed at 9/12/2023 1209  Gross per 24 hour   Intake 2463.08 ml   Output --   Net " 2463.08 ml       Physical Exam  Constitutional:       Appearance: Normal appearance.   HENT:      Head: Normocephalic and atraumatic.   Eyes:      Conjunctiva/sclera: Conjunctivae normal.   Cardiovascular:      Rate and Rhythm: Normal rate and regular rhythm.      Pulses: Normal pulses.      Heart sounds: Normal heart sounds.   Pulmonary:      Effort: Pulmonary effort is normal. No respiratory distress.      Breath sounds: Normal breath sounds.   Abdominal:      General: Bowel sounds are normal. There is no distension.      Palpations: Abdomen is soft.   Musculoskeletal:      Right lower leg: No edema.      Left lower leg: No edema.   Skin:     General: Skin is warm and dry.   Neurological:      Mental Status: He is alert. Mental status is at baseline.   Psychiatric:         Mood and Affect: Mood normal.         Thought Content: Thought content normal.         Judgment: Judgment normal.        Labs:   I have reviewed the following labs below:    Recent Labs   Lab 09/11/23  1149 09/11/23  1555 09/11/23  1556 09/11/23  1738 09/11/23  1850 09/11/23  2332 09/12/23  0602 09/12/23  0735   WBC 8.34  --  6.65  --   --   --  7.77  --    HGB 9.8*  --  9.1*  --   --   --  8.3*  --    HCT 31.4*  --  28.6*  --   --   --  25.9*  --      --  196  --   --   --  203  --      --   --   --  139  --  141  --    K 5.4*  --   --   --  5.1  --  4.5  --    CO2 14*  --   --   --  15*  --  18*  --    .3*  --   --   --  104.0*  --  101.9*  --    CREATININE 12.11*  --   --   --  12.27*  --  12.05*  --    CALCIUM 7.8*  --   --   --  7.5*  --  7.1*  --    MG  --   --   --  1.90  --   --  1.90  --    PHOS  --   --   --  7.1*  --   --  6.9*  --    ALBUMIN 3.1*  --   --   --  2.6*  --  2.6*  --    BILITOT 0.4  --   --   --  0.4  --  0.4  --    AST 37*  --   --   --  24  --  19  --    ALT 48  --   --   --  37  --  30  --    ALKPHOS 68  --   --   --  58  --  53  --    PTT  --  31.3  --   --   --  48.7*  --  75.6*   INR  --  1.1   --   --   --   --   --   --    TROPONINI 0.298*  --   --  0.304*  --  0.259* 0.228*  --    BNP 1,656.2*  --   --   --   --   --   --   --        Cardiovascular Studies/Imaging:   I have reviewed the following studies below:    TTE (9/11/2023):     Left Ventricle: The left ventricle is normal in size. Normal wall thickness. There is mildly reduced systolic function. Biplane (2D) method of discs ejection fraction is 45%.    There is mild  anteroseptal hypokinesis.    Left Atrium: Left atrium is mildly dilated.    Right Ventricle: Normal right ventricular cavity size. Systolic function is normal.    Right Atrium: Right atrium is mildly dilated.    Aortic Valve: The aortic valve is a trileaflet valve. There is normal leaflet mobility.    Mitral Valve: There is trace regurgitation.    Tricuspid Valve: There is mild to moderate regurgitation.    Pulmonary Artery: The estimated pulmonary artery systolic pressure is 79 mmHg.    IVC/SVC: Normal venous pressure at 3 mmHg.         Assessment & Plan:      NSTEMI   - likely demand ischemic    - troponin peaked at 0.3- although unreliable in the setting of acute renal failure   - unable to perform LHC in the setting of acute renal failure    - recommend stress nuclear     HFrEF   - EF 45% on echo this admission     - will need stress testing once euvolemic    - recommend initiating GDMT when transitioning off of HTN crisis protocol     Pulmonary HTN   - will need further workup to determine etiology   - recommend V/Q scan and outpatient sleep study    - if above testing is normal - will need outpatient RHC    Thank you for allowing us to participate in the care of this patient. We will continue to follow along.     Lizeth Daugherty DO  [facility] Cardiology Fellow, PGY-6  09/12/2023 5:02 PM

## 2023-09-12 NOTE — PLAN OF CARE
09/12/23 1154   Discharge Assessment   Assessment Type Discharge Planning Assessment   Confirmed/corrected address, phone number and insurance Yes   Confirmed Demographics Correct on Facesheet   Source of Information patient   Reason For Admission SOB (shortness of breath)   N17.9 BIA (acute kidney injury)  R07.9 Chest pain  I16.1 Hypertensive emergency   People in Home friend(s)   Facility Arrived From: Home   Do you expect to return to your current living situation? Yes   Do you have help at home or someone to help you manage your care at home? Yes   Who are your caregiver(s) and their phone number(s)? FriendRonni (Friend)   [phone]   Prior to hospitilization cognitive status: Alert/Oriented   Current cognitive status: Alert/Oriented   Equipment Currently Used at Home none   Readmission within 30 days? No   Patient currently being followed by outpatient case management? No   Do you currently have service(s) that help you manage your care at home? No   Do you take prescription medications? No   Do you have prescription coverage? No   Do you have any problems affording any of your prescribed medications? TBD   Who is going to help you get home at discharge? Friend   How do you get to doctors appointments? family or friend will provide   Are you on dialysis? No   DME Needed Upon Discharge  none   Discharge Plan discussed with: Patient   Transition of Care Barriers Unisured   Discharge Plan A Home   Physical Activity   On average, how many days per week do you engage in moderate to strenuous exercise (like a brisk walk)? 0 days   On average, how many minutes do you engage in exercise at this level? 0 min   Financial Resource Strain   How hard is it for you to pay for the very basics like food, housing, medical care, and heating? Not very   Housing Stability   In the last 12 months, was there a time when you were not able to pay the mortgage or rent on time? N   In the last 12 months, how many places have you  lived? 2   In the last 12 months, was there a time when you did not have a steady place to sleep or slept in a shelter (including now)? N   Transportation Needs   In the past 12 months, has lack of transportation kept you from medical appointments or from getting medications? no   In the past 12 months, has lack of transportation kept you from meetings, work, or from getting things needed for daily living? No   Food Insecurity   Within the past 12 months, you worried that your food would run out before you got the money to buy more. Never true   Stress   Do you feel stress - tense, restless, nervous, or anxious, or unable to sleep at night because your mind is troubled all the time - these days? Not at all   Social Connections   In a typical week, how many times do you talk on the phone with family, friends, or neighbors? More than 3   How often do you get together with friends or relatives? More than 3   Are you , , , , never , or living with a partner?    Alcohol Use   Q1: How often do you have a drink containing alcohol? Never   Q2: How many drinks containing alcohol do you have on a typical day when you are drinking? None   Q3: How often do you have six or more drinks on one occasion? Never     Pt  with 3 children who all reside in [place]; Pt lives with friend & emergency contact, Ronni; Employed at a [employer] in the kitchen; Home address updated in Epic; Pt has no insurance or SS #; CM to follow for d/c planning needs.

## 2023-09-12 NOTE — CARE UPDATE
Physician staffed by intensivist today, refer to H&P dated yesterday.     Updates to patient care today:  Had extensive conversation with the patient this morning about need for dialysis.  Patient was initially hesitant, but finally became agreeable to starting dialysis.  General surgery was consulted by the Nephrology team for placement of HD catheter.  Patient had a positive D-dimer, and evidence of rather significant pulmonary hypertension (without signs of right heart strain) on transthoracic echocardiogram.  Will obtain a CTA of the chest following HD catheter placement and patient will be dialyzed following CTA.  Nephrology is aware of our plan for a contrasted CT study.  If CTA is negative, okay to stop heparin drip, patient also had negative DVT ultrasound.    Patient's troponin has also started to downtrend.  He has been evaluated by Cardiology.  Troponin elevation was likely secondary to demand ischemia.  Ordered a Lexiscan to evaluate for inducible reversible ischemia.  However, priority at this time is to get the patient dialyzed.    Patient has also been weaned off his nicardipine infusion.  He was started on carvedilol 12.5 mg b.i.d. would ideally also start additional GDMT given his decreased ejection fraction, but with his acute renal failure, we cannot start ACE/ARB, MRA, SGLT2 inhibitor at his time.  With this limitation, and as patient is still hypertensive, will start Bidil as an alternative as it has been shown to be beneficial in specific populations with cardiomyopathy.  Can use hydralazine/labetalol IV push as needed.  He is stable for transfer to floor.

## 2023-09-12 NOTE — PROGRESS NOTES
"Nephrology Progress Note    Hospital course:   Patient admitted to ICU for hypertensive emergency placed on nitro drip.  Cardiology consulted for NSTEMI with troponins 0.304, now downtrended.  Overnight, patient placed on Cardene drip due to uncontrolled blood pressure.  Renal indices did not improve with administration of sodium bicarb drip.    Subjective:   No issues overnight blood pressure now better controlled with Cardene drip patient has no acute complaints this morning, denying chest pain and shortness of breath.  Renal indices did not improve, BUN/creatinine 101.9/12.05.  With only very slight improvement in renal indices, recommended patient to start hemodialysis at this time.  Patient understands renal indices did not improve with medical management and would benefit from HD. at this time, patient is agreeable to HD.    Objective:   /87   Pulse 75   Temp 98 °F (36.7 °C)   Resp (!) 21   Ht 5' 2.01" (1.575 m)   Wt 51.2 kg (112 lb 14 oz)   SpO2 (!) 93%   BMI 20.64 kg/m²     Intake/Output Summary (Last 24 hours) at 9/12/2023 1247  Last data filed at 9/12/2023 1209  Gross per 24 hour   Intake 2463.08 ml   Output --   Net 2463.08 ml        Physical exam:   Physical Exam  Constitutional:       General: He is not in acute distress.     Appearance: Normal appearance. He is not ill-appearing or toxic-appearing.   HENT:      Mouth/Throat:      Mouth: Mucous membranes are moist.   Eyes:      Extraocular Movements: Extraocular movements intact.   Cardiovascular:      Rate and Rhythm: Normal rate and regular rhythm.      Pulses: Normal pulses.      Heart sounds: Normal heart sounds. No murmur heard.     No friction rub. No gallop.   Pulmonary:      Effort: Pulmonary effort is normal. No respiratory distress.      Breath sounds: Normal breath sounds. No wheezing.   Abdominal:      General: Abdomen is flat. Bowel sounds are normal. There is no distension.      Palpations: Abdomen is soft. There is no mass. "      Tenderness: There is no abdominal tenderness.   Musculoskeletal:         General: Normal range of motion.      Right lower leg: Edema present.      Left lower leg: Edema present.      Comments: Trace pitting edema   Skin:     General: Skin is warm and dry.      Capillary Refill: Capillary refill takes less than 2 seconds.      Coloration: Skin is not jaundiced or pale.      Findings: No bruising.   Neurological:      General: No focal deficit present.      Mental Status: He is alert.   Psychiatric:         Mood and Affect: Mood normal.         Behavior: Behavior normal.         Thought Content: Thought content normal.         Judgment: Judgment normal.           Laboratory data:   Recent Results (from the past 24 hour(s))   COVID/FLU A&B PCR    Collection Time: 09/11/23  1:42 PM   Result Value Ref Range    Influenza A PCR Not Detected Not Detected    Influenza B PCR Not Detected Not Detected    SARS-CoV-2 PCR Not Detected Not Detected, Negative, Invalid   Drug Screen, Urine    Collection Time: 09/11/23  2:21 PM   Result Value Ref Range    Amphetamines, Urine Negative Negative    Barbituates, Urine Negative Negative    Benzodiazepine, Urine Negative Negative    Cannabinoids, Urine Negative Negative    Cocaine, Urine Negative Negative    Fentanyl, Urine Negative Negative    MDMA, Urine Negative Negative    Opiates, Urine Negative Negative    Phencyclidine, Urine Negative Negative    pH, Urine 6.0 3.0 - 11.0   Echo    Collection Time: 09/11/23  2:55 PM   Result Value Ref Range    BSA 1.5 m2    Nix's Biplane MOD Ejection Fraction 45 %    LVOT stroke volume 52.63 cm3    LVIDd 4.65 3.5 - 6.0 cm    LV Systolic Volume 61.38 mL    LV Systolic Volume Index 40.9 mL/m2    LVIDs 3.79 2.1 - 4.0 cm    LV Diastolic Volume 99.63 mL    LV Diastolic Volume Index 66.42 mL/m2    IVS 1.11 (A) 0.6 - 1.1 cm    LVOT diameter 1.93 cm    LVOT area 2.9 cm2    FS 18 (A) 28 - 44 %    Left Ventricle Relative Wall Thickness 0.43 cm     Posterior Wall 0.99 0.6 - 1.1 cm    LV mass 172.83 g    LV Mass Index 115 g/m2    MV Peak E Yon 1.25 m/s    TDI LATERAL 0.08 m/s    TDI SEPTAL 0.06 m/s    E/E' ratio 17.86 m/s    MV Peak A Yon 1.25 m/s    TR Max Yon 4.35 m/s    E/A ratio 1.00     E wave deceleration time 65.17 msec    LV SEPTAL E/E' RATIO 20.83 m/s    LV LATERAL E/E' RATIO 15.63 m/s    LVOT peak yon 1.17 m/s    Left Ventricular Outflow Tract Mean Velocity 0.88 cm/s    Left Ventricular Outflow Tract Mean Gradient 3.29 mmHg    LA size 4.66 cm    RVDD 2.33 cm    AV mean gradient 5 mmHg    AV peak gradient 10 mmHg    Ao peak yon 1.60 m/s    Ao VTI 28.00 cm    LVOT peak VTI 18.00 cm    AV valve area 1.88 cm²    AV Velocity Ratio 0.73     AV index (prosthetic) 0.64     SAVANNAH by Velocity Ratio 2.14 cm²    MV mean gradient 4 mmHg    MV peak gradient 6 mmHg    MV stenosis pressure 1/2 time 18.90 ms    MV valve area p 1/2 method 11.64 cm2    MV valve area by continuity eq 3.06 cm2    MV VTI 17.2 cm    Triscuspid Valve Regurgitation Peak Gradient 76 mmHg    Mean e' 0.07 m/s    ZLVIDS 2.53     ZLVIDD 0.48     TV resting pulmonary artery pressure 79 mmHg    RV TB RVSP 7 mmHg    Est. RA pres 3 mmHg   Protein/Creatinine Ratio, Urine    Collection Time: 09/11/23  3:46 PM   Result Value Ref Range    Urine Protein Level 407.8 mg/dL    Urine Creatinine 64.8 63.0 - 166.0 mg/dL    Urine Protein/Creatinine Ratio 6.3    Urinalysis, Reflex to Urine Culture    Collection Time: 09/11/23  3:46 PM    Specimen: Urine   Result Value Ref Range    Color, UA Colorless (A) Yellow, Light-Yellow, Dark Yellow, Maeve, Straw    Appearance, UA Clear Clear    Specific Gravity, UA 1.010 1.005 - 1.030    pH, UA 6.0 5.0 - 8.5    Protein, UA 3+ (A) Negative    Glucose, UA Trace (A) Negative, Normal    Ketones, UA Negative Negative    Blood, UA 1+ (A) Negative    Bilirubin, UA Negative Negative    Urobilinogen, UA Normal 0.2, 1.0, Normal    Nitrites, UA Negative Negative    Leukocyte Esterase, UA  Negative Negative    WBC, UA 0-5 None Seen, 0-2, 3-5, 0-5 /HPF    Bacteria, UA Trace (A) None Seen /HPF    Squamous Epithelial Cells, UA Trace (A) None Seen /HPF    Hyaline Casts, UA None Seen None Seen /lpf    Unclassified Crystal, UA Trace (A) None Seen /HPF    RBC, UA 0-5 None Seen, 0-2, 3-5, 0-5 /HPF   Sodium, Random Urine    Collection Time: 09/11/23  3:46 PM   Result Value Ref Range    Urine Sodium 51.0 mmol/L   APTT    Collection Time: 09/11/23  3:55 PM   Result Value Ref Range    PTT 31.3 23.2 - 33.7 seconds   Protime-INR    Collection Time: 09/11/23  3:55 PM   Result Value Ref Range    PT 13.5 11.4 - 14.0 seconds    INR 1.1 <=1.3   CBC with Differential    Collection Time: 09/11/23  3:56 PM   Result Value Ref Range    WBC 6.65 4.50 - 11.50 x10(3)/mcL    RBC 3.73 (L) 4.70 - 6.10 x10(6)/mcL    Hgb 9.1 (L) 14.0 - 18.0 g/dL    Hct 28.6 (L) 42.0 - 52.0 %    MCV 76.7 (L) 80.0 - 94.0 fL    MCH 24.4 (L) 27.0 - 31.0 pg    MCHC 31.8 (L) 33.0 - 36.0 g/dL    RDW 14.6 11.5 - 17.0 %    Platelet 196 130 - 400 x10(3)/mcL    MPV 9.6 7.4 - 10.4 fL    Neut % 76.5 %    Lymph % 15.9 %    Mono % 6.5 %    Eos % 0.5 %    Basophil % 0.3 %    Lymph # 1.06 0.6 - 4.6 x10(3)/mcL    Neut # 5.09 2.1 - 9.2 x10(3)/mcL    Mono # 0.43 0.1 - 1.3 x10(3)/mcL    Eos # 0.03 0 - 0.9 x10(3)/mcL    Baso # 0.02 <=0.2 x10(3)/mcL    IG# 0.02 0 - 0.04 x10(3)/mcL    IG% 0.3 %    NRBC% 0.0 %   C4 Complement    Collection Time: 09/11/23  3:56 PM   Result Value Ref Range    C4 Complement 38.0 13.0 - 46.0 mg/dL   C3 Complement    Collection Time: 09/11/23  3:56 PM   Result Value Ref Range    C3 Complement 88 80 - 173 mg/dL   Haptoglobin    Collection Time: 09/11/23  3:56 PM   Result Value Ref Range    Haptoglobin 204 40 - 368 mg/dL   Lactic Acid, Plasma    Collection Time: 09/11/23  3:56 PM   Result Value Ref Range    Lactic Acid Level 0.9 0.5 - 2.2 mmol/L   RT Blood Gas    Collection Time: 09/11/23  4:34 PM   Result Value Ref Range    Sample Type Arterial  Blood     Sample site Right Radial Artery     Drawn by JWL     pH, Blood gas 7.350 7.350 - 7.450    pCO2, Blood gas 27.0 (L) 35.0 - 45.0 mmHg    pO2, Blood gas 61.0 (L) 75.0 - 100.0 mmHg    TOC2, Blood gas 15.7 (L) 22.0 - 26.0 mmol/L    Base Excess, Blood gas -9.60 (L) -2.00 - 2.00 mmol/L    sO2, Blood gas 93.5 >=90.0 %    HCO3, Blood gas 14.9 (LL) 22.0 - 26.0 mmol/L    pAO2 116 mmHg    A-aDO2 55 mmHg    THb, Blood gas 8.7 (L) 12 - 18 g/dL    O2 Hb, Blood Gas 91.6 (L) 94.0 - 100.0 %    CO Hgb 1.6 (H) 0.5 - 1.5 %    Met Hgb 0.3 0 - 1.5 %    Allens Test Yes     FIO2, Blood gas 21.0 %   Troponin I    Collection Time: 09/11/23  5:38 PM   Result Value Ref Range    Troponin-I 0.304 (H) 0.000 - 0.045 ng/mL   Magnesium    Collection Time: 09/11/23  5:38 PM   Result Value Ref Range    Magnesium Level 1.90 1.60 - 2.60 mg/dL   Phosphorus    Collection Time: 09/11/23  5:38 PM   Result Value Ref Range    Phosphorus Level 7.1 (H) 2.3 - 4.7 mg/dL   Comprehensive Metabolic Panel    Collection Time: 09/11/23  6:50 PM   Result Value Ref Range    Sodium Level 139 136 - 145 mmol/L    Potassium Level 5.1 3.5 - 5.1 mmol/L    Chloride 108 (H) 98 - 107 mmol/L    Carbon Dioxide 15 (L) 23 - 31 mmol/L    Glucose Level 116 (H) 82 - 115 mg/dL    Blood Urea Nitrogen 104.0 (H) 8.4 - 25.7 mg/dL    Creatinine 12.27 (H) 0.73 - 1.18 mg/dL    Calcium Level Total 7.5 (L) 8.8 - 10.0 mg/dL    Protein Total 6.0 5.8 - 7.6 gm/dL    Albumin Level 2.6 (L) 3.4 - 4.8 g/dL    Globulin 3.4 2.4 - 3.5 gm/dL    Albumin/Globulin Ratio 0.8 (L) 1.1 - 2.0 ratio    Bilirubin Total 0.4 <=1.5 mg/dL    Alkaline Phosphatase 58 40 - 150 unit/L    Alanine Aminotransferase 37 0 - 55 unit/L    Aspartate Aminotransferase 24 5 - 34 unit/L    eGFR 4 mls/min/1.73/m2   Uric Acid    Collection Time: 09/11/23  6:50 PM   Result Value Ref Range    Uric Acid 9.1 (H) 3.5 - 7.2 mg/dL   Troponin I    Collection Time: 09/11/23 11:32 PM   Result Value Ref Range    Troponin-I 0.259 (H) 0.000 -  0.045 ng/mL   APTT    Collection Time: 09/11/23 11:32 PM   Result Value Ref Range    PTT 48.7 (H) 23.2 - 33.7 seconds   Hemoglobin A1C    Collection Time: 09/12/23  6:02 AM   Result Value Ref Range    Hemoglobin A1c 5.6 <=7.0 %    Estimated Average Glucose 114.0 mg/dL   Troponin I    Collection Time: 09/12/23  6:02 AM   Result Value Ref Range    Troponin-I 0.228 (H) 0.000 - 0.045 ng/mL   Phosphorus    Collection Time: 09/12/23  6:02 AM   Result Value Ref Range    Phosphorus Level 6.9 (H) 2.3 - 4.7 mg/dL   Magnesium    Collection Time: 09/12/23  6:02 AM   Result Value Ref Range    Magnesium Level 1.90 1.60 - 2.60 mg/dL   Lipid panel    Collection Time: 09/12/23  6:02 AM   Result Value Ref Range    Cholesterol Total 166 <=200 mg/dL    HDL Cholesterol 43 35 - 60 mg/dL    Triglyceride 47 34 - 140 mg/dL    Cholesterol/HDL Ratio 4 0 - 5    Very Low Density Lipoprotein 9     LDL Cholesterol 114.00 50.00 - 140.00 mg/dL   Comprehensive Metabolic Panel (CMP)    Collection Time: 09/12/23  6:02 AM   Result Value Ref Range    Sodium Level 141 136 - 145 mmol/L    Potassium Level 4.5 3.5 - 5.1 mmol/L    Chloride 109 (H) 98 - 107 mmol/L    Carbon Dioxide 18 (L) 23 - 31 mmol/L    Glucose Level 102 82 - 115 mg/dL    Blood Urea Nitrogen 101.9 (H) 8.4 - 25.7 mg/dL    Creatinine 12.05 (H) 0.73 - 1.18 mg/dL    Calcium Level Total 7.1 (L) 8.8 - 10.0 mg/dL    Protein Total 5.9 5.8 - 7.6 gm/dL    Albumin Level 2.6 (L) 3.4 - 4.8 g/dL    Globulin 3.3 2.4 - 3.5 gm/dL    Albumin/Globulin Ratio 0.8 (L) 1.1 - 2.0 ratio    Bilirubin Total 0.4 <=1.5 mg/dL    Alkaline Phosphatase 53 40 - 150 unit/L    Alanine Aminotransferase 30 0 - 55 unit/L    Aspartate Aminotransferase 19 5 - 34 unit/L    eGFR 4 mls/min/1.73/m2   CBC with Differential    Collection Time: 09/12/23  6:02 AM   Result Value Ref Range    WBC 7.77 4.50 - 11.50 x10(3)/mcL    RBC 3.48 (L) 4.70 - 6.10 x10(6)/mcL    Hgb 8.3 (L) 14.0 - 18.0 g/dL    Hct 25.9 (L) 42.0 - 52.0 %    MCV 74.4 (L)  80.0 - 94.0 fL    MCH 23.9 (L) 27.0 - 31.0 pg    MCHC 32.0 (L) 33.0 - 36.0 g/dL    RDW 14.4 11.5 - 17.0 %    Platelet 203 130 - 400 x10(3)/mcL    MPV 9.4 7.4 - 10.4 fL    Neut % 64.2 %    Lymph % 24.6 %    Mono % 8.1 %    Eos % 2.2 %    Basophil % 0.6 %    Lymph # 1.91 0.6 - 4.6 x10(3)/mcL    Neut # 4.99 2.1 - 9.2 x10(3)/mcL    Mono # 0.63 0.1 - 1.3 x10(3)/mcL    Eos # 0.17 0 - 0.9 x10(3)/mcL    Baso # 0.05 <=0.2 x10(3)/mcL    IG# 0.02 0 - 0.04 x10(3)/mcL    IG% 0.3 %    NRBC% 0.0 %   CK    Collection Time: 09/12/23  6:02 AM   Result Value Ref Range    Creatine Kinase 395 (H) 30 - 200 U/L   Hepatitis B surface antigen    Collection Time: 09/12/23  6:02 AM   Result Value Ref Range    Hepatitis B Surface Antigen Nonreactive Nonreactive   APTT    Collection Time: 09/12/23  7:35 AM   Result Value Ref Range    PTT 75.6 (H) 23.2 - 33.7 seconds   Hepatitis B Surface Ab, Qualitative    Collection Time: 09/12/23  7:35 AM   Result Value Ref Range    Hepatitis B Surface Antibody Nonreactive Nonreactive    Hepatitis B Surface Antibody Qnt 0.33 mIU/mL       Imaging:   Imaging Results              US Doppler Renal Artery and Vein (xpd) (Final result)  Result time 09/12/23 12:27:06      Final result by Rodrigo Alegria MD (09/12/23 12:27:06)                   Impression:      1. Medical renal disease.  2. No hydronephrosis.  1.    Electronically signed by: Rodrigo Alegria  Date:    09/12/2023  Time:    12:27               Narrative:    EXAMINATION:  US DOPPLER RENAL ARTERY AND VEIN (XPD)    CLINICAL HISTORY:  hypertensive emergency;    COMPARISON:  11 September 2023    FINDINGS:  Grayscale, color and spectral Doppler sonographic evaluation of the kidneys and renal vasculature.    The right kidney measures 8.5 cm. The left kidney measures 8 cm.   No hydronephrosis.  There is increased renal parenchymal echogenicity.  There is a right renal cyst measuring 3-4 cm.  No follow-up is needed.    There are elevated resistive indices in  segmental renal arteries.  No significantly elevated velocities in the main renal arteries.  Renal veins are patent.                                       US Retroperitoneal Complete (Final result)  Result time 09/11/23 15:39:56      Final result by Malcom Castro MD (09/11/23 15:39:56)                   Narrative:    EXAMINATION  US RETROPERITONEAL COMPLETE    CLINICAL HISTORY  rule out hydronephrosis;    TECHNIQUE  Multiplanar grayscale sonographic images were obtained of the retroperitoneum and pelvis.    COMPARISON  None available at the time of initial interpretation.    FINDINGS  Exam quality: adequate for evaluation    Kidneys: The right kidney measures 8.2 cm x 3.4 cm x 4.4 cm, with the left kidney 8.1 cm x 3.9 cm x 3.9 cm.  The bilateral renal parenchyma is echogenic relative to the adjacent liver and spleen.  No solid mass-like lesion or complex cyst is identified.  A simple right lower pole cyst measures up to 3.1 cm in diameter, with additional simple cyst at the left upper renal cortex measuring 1.1 cm.  No collecting system dilatation.  No shadowing calcification is identified.  No perinephric collection or free abdominal fluid.    Bladder: No convincing intraluminal abnormality.  No wall thickening or focal mural lesion.    IMPRESSION  1. Increased echogenicity of the kidneys is nonspecific but suggest element of chronic medical renal disease.  2. Simple bilateral cortical cysts.  3. No evidence of acute urologic abnormality.      Electronically signed by: Malcom Castro  Date:    09/11/2023  Time:    15:39                                     CT Chest Without Contrast (Final result)  Result time 09/11/23 15:38:00      Final result by Malcom Castro MD (09/11/23 15:38:00)                   Narrative:    EXAMINATION  CT CHEST WITHOUT CONTRAST    CLINICAL HISTORY  possible new onset chf vs pneumonia;    TECHNIQUE  Non-contrast helical-acquisition CT images were obtained and multiplanar reformats  accomplished by a CT technologist at a separate workstation, pushed to PACS for physician review.    COMPARISON  Chest radiograph obtained earlier the same day.    FINDINGS  Images were reviewed in lung, soft tissue, and bone windows.    Exam quality: Limited secondary to patient movement throughout image acquisition, with resulting artifact.  Additionally, non-contrast protocol further limits overall diagnostic value.    Lines/tubes: none visualized    Heart chambers are prominent in size.  There is scattered vascular calcification, with no aneurysmal dilatation or other focal abnormality identified.  Hypoattenuation of the blood pool is nonspecific but suggest element of anemia.  There is no significant pericardial fluid.    Central airway patency is widely maintained.  Patchy bilateral multilobar areas of ground-glass airspace attenuation noted, with central predominance.  There are also small volume layering bilateral pleural effusions.  No loculated fluid component is identified.  There is no pneumothorax.    No convincing acute abnormality of the included upper abdominal solid organs.  Simple posterior right renal cortex cyst is incidentally identified.  The thyroid tissue is unremarkable.  No acute or destructive skeletal abnormality is identified.    IMPRESSION  1. Findings suggestive of central vascular congestion and developing pulmonary edema.  2. Small volume bilateral pleural effusions.  3. Nonspecific hypodensity of the blood pool, can be seen with anemia.    RADIATION DOSE  Automated tube current modulation, weight-based exposure dosing, and/or iterative reconstruction technique utilized to reach lowest reasonably achievable exposure rate.    DLP: 203 mGy*cm      Electronically signed by: Malcom Castro  Date:    09/11/2023  Time:    15:38                                     X-Ray Chest 1 View (Final result)  Result time 09/11/23 13:33:20      Final result by Malcom Castro MD (09/11/23 13:33:20)                    Narrative:    EXAMINATION  XR CHEST 1 VIEW    CLINICAL HISTORY  sob;    TECHNIQUE  A total of 1 frontal image(s) of the chest.    COMPARISON  None available at the time of initial interpretation.    FINDINGS  Lines/tubes/devices: none present    The cardiomediastinal silhouette and central pulmonary vasculature are unremarkable for utilized technique.  The trachea is midline.  Ill-defined right perihilar and basilar infiltrate noted, with no organized lobar consolidation identified.  Left lung field is clear.  There is no large pleural effusion or convincing pneumothorax.    There is no acute osseous or extrathoracic abnormality.    IMPRESSION  Ill-defined right perihilar/basilar infiltrate may represent developing atypical infectious process.      Electronically signed by: Malcom Castro  Date:    09/11/2023  Time:    13:33                                     Medications:     Current Facility-Administered Medications:     aspirin chewable tablet 81 mg, 81 mg, Oral, Daily, Stroda, Austin, DO, 81 mg at 09/12/23 0836    carvediloL tablet 12.5 mg, 12.5 mg, Oral, BID, Stroda, Austin, DO, 12.5 mg at 09/12/23 0837    dextrose 10% bolus 125 mL 125 mL, 12.5 g, Intravenous, PRN, Stroda, Austin, DO    dextrose 10% bolus 250 mL 250 mL, 25 g, Intravenous, PRN, Stroda, Austin, DO    glucagon (human recombinant) injection 1 mg, 1 mg, Intramuscular, PRN, Stroda, Austin, DO    glucose chewable tablet 16 g, 16 g, Oral, PRN, Stroda, Austin, DO    glucose chewable tablet 24 g, 24 g, Oral, PRN, Stroda, Austin, DO    heparin 25,000 units in dextrose 5% (100 units/ml) IV bolus from bag - ADDITIONAL PRN BOLUS - 30 units/kg (max bolus 4000 units), 30 Units/kg (Adjusted), Intravenous, PRN, Stroda, Austin, DO    heparin 25,000 units in dextrose 5% (100 units/ml) IV bolus from bag - ADDITIONAL PRN BOLUS - 60 units/kg (max bolus 4000 units), 60 Units/kg (Adjusted), Intravenous, PRN, Stroda, Austin, DO, 3,100 Units at 09/12/23 0110    heparin 25,000  units in dextrose 5% 250 mL (100 units/mL) infusion LOW INTENSITY nomogram - LAF, 0-40 Units/kg/hr (Adjusted), Intravenous, Continuous, Stroda, Austin, DO, Last Rate: 7.8 mL/hr at 09/12/23 1209, 15 Units/kg/hr at 09/12/23 1209    hydrALAZINE injection 10 mg, 10 mg, Intravenous, Q2H PRN, Rodrigo Pelaez MD    labetalol 20 mg/4 mL (5 mg/mL) IV syring, 10 mg, Intravenous, Q2H PRN, Rodrigo Pelaez MD    mupirocin 2 % ointment, , Nasal, BID, Raffaele Almodovar MD, Given at 09/12/23 0837    naloxone 0.4 mg/mL injection 0.02 mg, 0.02 mg, Intravenous, PRN, Stroda, Austin, DO    niCARdipine 40 mg/200 mL (0.2 mg/mL) infusion, 0-15 mg/hr, Intravenous, Continuous, Meaghan Goetz MD, Stopped at 09/12/23 0950    pantoprazole EC tablet 40 mg, 40 mg, Oral, QHS, Stroda, Austin, DO, 40 mg at 09/11/23 2108    sodium bicarbonate 100 mEq in dextrose 5 % and 0.45 % NaCl 1,000 mL infusion, , Intravenous, Continuous, Stroda, Austin, DO, Last Rate: 100 mL/hr at 09/12/23 1209, Rate Verify at 09/12/23 1209    sodium chloride 0.9% flush 10 mL, 10 mL, Intravenous, Q12H PRN, Stroda, Austin, DO     Impression/Plan   Nonoliguric Acute renal failure   Anion gap metabolic acidosis  -Without prior kidney disease, likely has above.  -Current workup revealing a nephrotic syndrome type picture without known etiology. Urine protein/Cr ratio is 6.3. Normal complement levels, with normal urine sodium.   -Pending JAMES/ANCA, SPEP/UPEP  -with minimal improvement with minimal improvement in renal indices, patient will need HD. spoke with the patient, and he is agreeable.  -please consult General surgery for HD catheter placement, appreciate assistance with care.  -will monitor H/H  -repeat CMP/mg/phos in the a.m. and will replete electrolytes as needed.    3. Hypertensive emergency  4. Congestive Heart Failure  5. NSTEMI  -Rest of management per primary team      Eliezer Avila DO  [employer] [facility], PGY-II  [facility]    Addendum:   Doppler of renal arteries negative  for BELKIS. Check remain aldosterone levels. Discussed the case with Dr Vargas this morning concerning antihypertensive medication regimen.  Further work up for ARF and nephrotic syndrome underway. Ultimately, needs kidney biopsy.  Brittany Hyman M.D.  Nephrology

## 2023-09-13 LAB
ABO + RH BLD: NORMAL
ABO + RH BLD: NORMAL
ABORH RETYPE: NORMAL
ALBUMIN SERPL-MCNC: 2.3 G/DL (ref 3.4–4.8)
ALBUMIN/GLOB SERPL: 0.8 RATIO (ref 1.1–2)
ALP SERPL-CCNC: 50 UNIT/L (ref 40–150)
ALT SERPL-CCNC: 21 UNIT/L (ref 0–55)
APTT PPP: 28.6 SECONDS
AST SERPL-CCNC: 14 UNIT/L (ref 5–34)
BASOPHILS # BLD AUTO: 0.02 X10(3)/MCL
BASOPHILS NFR BLD AUTO: 0.4 %
BILIRUB SERPL-MCNC: 0.4 MG/DL
BLD PROD TYP BPU: NORMAL
BLD PROD TYP BPU: NORMAL
BLOOD UNIT EXPIRATION DATE: NORMAL
BLOOD UNIT EXPIRATION DATE: NORMAL
BLOOD UNIT TYPE CODE: 6200
BLOOD UNIT TYPE CODE: 6200
BUN SERPL-MCNC: 50.3 MG/DL (ref 8.4–25.7)
CALCIUM SERPL-MCNC: 7.4 MG/DL (ref 8.8–10)
CHLORIDE SERPL-SCNC: 102 MMOL/L (ref 98–107)
CO2 SERPL-SCNC: 26 MMOL/L (ref 23–31)
CREAT SERPL-MCNC: 7.61 MG/DL (ref 0.73–1.18)
CROSSMATCH INTERPRETATION: NORMAL
CROSSMATCH INTERPRETATION: NORMAL
CV STRESS BASE HR: 82 BPM
DIASTOLIC BLOOD PRESSURE: 106 MMHG
DISPENSE STATUS: NORMAL
DISPENSE STATUS: NORMAL
EOSINOPHIL # BLD AUTO: 0.02 X10(3)/MCL (ref 0–0.9)
EOSINOPHIL NFR BLD AUTO: 0.4 %
ERYTHROCYTE [DISTWIDTH] IN BLOOD BY AUTOMATED COUNT: 14.4 % (ref 11.5–17)
GFR SERPLBLD CREATININE-BSD FMLA CKD-EPI: 7 MLS/MIN/1.73/M2
GLOBULIN SER-MCNC: 3 GM/DL (ref 2.4–3.5)
GLUCOSE SERPL-MCNC: 129 MG/DL (ref 82–115)
GROUP & RH: NORMAL
HCT VFR BLD AUTO: 23.7 % (ref 42–52)
HGB BLD-MCNC: 7.5 G/DL (ref 14–18)
IMM GRANULOCYTES # BLD AUTO: 0.01 X10(3)/MCL (ref 0–0.04)
IMM GRANULOCYTES NFR BLD AUTO: 0.2 %
INDIRECT COOMBS GEL: NORMAL
LYMPHOCYTES # BLD AUTO: 0.83 X10(3)/MCL (ref 0.6–4.6)
LYMPHOCYTES NFR BLD AUTO: 14.6 %
MAGNESIUM SERPL-MCNC: 1.7 MG/DL (ref 1.6–2.6)
MCH RBC QN AUTO: 23.9 PG (ref 27–31)
MCHC RBC AUTO-ENTMCNC: 31.6 G/DL (ref 33–36)
MCV RBC AUTO: 75.5 FL (ref 80–94)
MONOCYTES # BLD AUTO: 0.4 X10(3)/MCL (ref 0.1–1.3)
MONOCYTES NFR BLD AUTO: 7 %
NEUTROPHILS # BLD AUTO: 4.41 X10(3)/MCL (ref 2.1–9.2)
NEUTROPHILS NFR BLD AUTO: 77.4 %
NRBC BLD AUTO-RTO: 0 %
NUC REST EJECTION FRACTION: 61
NUC STRESS EJECTION FRACTION: 55 %
OHS CV CPX 85 PERCENT MAX PREDICTED HEART RATE MALE: 135
OHS CV CPX ESTIMATED METS: 2
OHS CV CPX MAX PREDICTED HEART RATE: 159
OHS CV CPX PATIENT IS FEMALE: 0
OHS CV CPX PATIENT IS MALE: 1
OHS CV CPX PEAK DIASTOLIC BLOOD PRESSURE: 106 MMHG
OHS CV CPX PEAK HEAR RATE: 115 BPM
OHS CV CPX PEAK RATE PRESSURE PRODUCT: NORMAL
OHS CV CPX PEAK SYSTOLIC BLOOD PRESSURE: 190 MMHG
OHS CV CPX PERCENT MAX PREDICTED HEART RATE ACHIEVED: 72
OHS CV CPX RATE PRESSURE PRODUCT PRESENTING: NORMAL
PHOSPHATE SERPL-MCNC: 5.3 MG/DL (ref 2.3–4.7)
PLATELET # BLD AUTO: 184 X10(3)/MCL (ref 130–400)
PMV BLD AUTO: 10.2 FL (ref 7.4–10.4)
POTASSIUM SERPL-SCNC: 4.4 MMOL/L (ref 3.5–5.1)
PROT SERPL-MCNC: 5.3 GM/DL (ref 5.8–7.6)
RBC # BLD AUTO: 3.14 X10(6)/MCL (ref 4.7–6.1)
SODIUM SERPL-SCNC: 139 MMOL/L (ref 136–145)
SPECIMEN OUTDATE: NORMAL
STRESS ECHO POST EXERCISE DUR MIN: 3 MINUTES
STRESS ECHO POST EXERCISE DUR SEC: 0 SECONDS
SYSTOLIC BLOOD PRESSURE: 190 MMHG
UNIT NUMBER: NORMAL
UNIT NUMBER: NORMAL
WBC # SPEC AUTO: 5.69 X10(3)/MCL (ref 4.5–11.5)

## 2023-09-13 PROCEDURE — P9016 RBC LEUKOCYTES REDUCED: HCPCS | Performed by: NURSE PRACTITIONER

## 2023-09-13 PROCEDURE — 86901 BLOOD TYPING SEROLOGIC RH(D): CPT | Performed by: NURSE PRACTITIONER

## 2023-09-13 PROCEDURE — 85730 THROMBOPLASTIN TIME PARTIAL: CPT | Performed by: INTERNAL MEDICINE

## 2023-09-13 PROCEDURE — 80100014 HC HEMODIALYSIS 1:1

## 2023-09-13 PROCEDURE — 25000003 PHARM REV CODE 250: Performed by: STUDENT IN AN ORGANIZED HEALTH CARE EDUCATION/TRAINING PROGRAM

## 2023-09-13 PROCEDURE — S5010 5% DEXTROSE AND 0.45% SALINE: HCPCS

## 2023-09-13 PROCEDURE — 63600175 PHARM REV CODE 636 W HCPCS: Performed by: INTERNAL MEDICINE

## 2023-09-13 PROCEDURE — 25000003 PHARM REV CODE 250

## 2023-09-13 PROCEDURE — 63600175 PHARM REV CODE 636 W HCPCS: Performed by: STUDENT IN AN ORGANIZED HEALTH CARE EDUCATION/TRAINING PROGRAM

## 2023-09-13 PROCEDURE — 97161 PT EVAL LOW COMPLEX 20 MIN: CPT

## 2023-09-13 PROCEDURE — 80053 COMPREHEN METABOLIC PANEL: CPT

## 2023-09-13 PROCEDURE — 84100 ASSAY OF PHOSPHORUS: CPT

## 2023-09-13 PROCEDURE — 25000003 PHARM REV CODE 250: Performed by: INTERNAL MEDICINE

## 2023-09-13 PROCEDURE — 86920 COMPATIBILITY TEST SPIN: CPT | Performed by: NURSE PRACTITIONER

## 2023-09-13 PROCEDURE — 85025 COMPLETE CBC W/AUTO DIFF WBC: CPT | Performed by: STUDENT IN AN ORGANIZED HEALTH CARE EDUCATION/TRAINING PROGRAM

## 2023-09-13 PROCEDURE — 36430 TRANSFUSION BLD/BLD COMPNT: CPT

## 2023-09-13 PROCEDURE — 21400001 HC TELEMETRY ROOM

## 2023-09-13 PROCEDURE — 83735 ASSAY OF MAGNESIUM: CPT

## 2023-09-13 RX ORDER — REGADENOSON 0.08 MG/ML
0.4 INJECTION, SOLUTION INTRAVENOUS ONCE
Status: COMPLETED | OUTPATIENT
Start: 2023-09-13 | End: 2023-09-13

## 2023-09-13 RX ORDER — MAGNESIUM SULFATE HEPTAHYDRATE 40 MG/ML
2 INJECTION, SOLUTION INTRAVENOUS ONCE
Status: COMPLETED | OUTPATIENT
Start: 2023-09-13 | End: 2023-09-13

## 2023-09-13 RX ORDER — HEPARIN SODIUM 5000 [USP'U]/ML
5000 INJECTION, SOLUTION INTRAVENOUS; SUBCUTANEOUS EVERY 12 HOURS
Status: DISCONTINUED | OUTPATIENT
Start: 2023-09-13 | End: 2023-09-20 | Stop reason: HOSPADM

## 2023-09-13 RX ORDER — CARVEDILOL 12.5 MG/1
25 TABLET ORAL 2 TIMES DAILY
Status: DISCONTINUED | OUTPATIENT
Start: 2023-09-13 | End: 2023-09-20 | Stop reason: HOSPADM

## 2023-09-13 RX ADMIN — CARVEDILOL 25 MG: 12.5 TABLET, FILM COATED ORAL at 09:09

## 2023-09-13 RX ADMIN — HEPARIN SODIUM 5000 UNITS: 5000 INJECTION, SOLUTION INTRAVENOUS; SUBCUTANEOUS at 09:09

## 2023-09-13 RX ADMIN — SODIUM BICARBONATE: 84 INJECTION, SOLUTION INTRAVENOUS at 02:09

## 2023-09-13 RX ADMIN — ISOSORBIDE DINITRATE: 20 TABLET ORAL at 09:09

## 2023-09-13 RX ADMIN — MAGNESIUM SULFATE HEPTAHYDRATE 2 G: 40 INJECTION, SOLUTION INTRAVENOUS at 09:09

## 2023-09-13 RX ADMIN — MUPIROCIN: 20 OINTMENT TOPICAL at 04:09

## 2023-09-13 RX ADMIN — REGADENOSON 0.4 MG: 0.08 INJECTION, SOLUTION INTRAVENOUS at 10:09

## 2023-09-13 RX ADMIN — ISOSORBIDE DINITRATE: 20 TABLET ORAL at 12:09

## 2023-09-13 RX ADMIN — HEPARIN SODIUM 5000 UNITS: 5000 INJECTION, SOLUTION INTRAVENOUS; SUBCUTANEOUS at 12:09

## 2023-09-13 RX ADMIN — ASPIRIN 81 MG CHEWABLE TABLET 81 MG: 81 TABLET CHEWABLE at 12:09

## 2023-09-13 RX ADMIN — CARVEDILOL 25 MG: 12.5 TABLET, FILM COATED ORAL at 12:09

## 2023-09-13 RX ADMIN — LABETALOL HYDROCHLORIDE 10 MG: 5 INJECTION, SOLUTION INTRAVENOUS at 04:09

## 2023-09-13 RX ADMIN — ISOSORBIDE DINITRATE: 20 TABLET ORAL at 04:09

## 2023-09-13 RX ADMIN — HYDRALAZINE HYDROCHLORIDE 10 MG: 20 INJECTION INTRAMUSCULAR; INTRAVENOUS at 05:09

## 2023-09-13 RX ADMIN — PANTOPRAZOLE SODIUM 40 MG: 40 TABLET, DELAYED RELEASE ORAL at 09:09

## 2023-09-13 NOTE — NURSING
09/13/23 1620   Post-Hemodialysis Assessment   Blood Volume Processed (Liters) 62.2 L   Dialyzer Clearance Lightly streaked   Duration of Treatment 180 minutes   Total UF (mL) 1600 mL   Patient Response to Treatment tolerated well, RIJ Temporary cvc functions well, 2 untis prbc given on hd, extra 600 ml uf removed for prbc's

## 2023-09-13 NOTE — PROGRESS NOTES
"[facility] Medicine Wards Progress Note     Provider Team: Internal Medicine List 1  Attending: Blanca Andres MD  Resident: Rodrigo Pelaez MD    Date of Admit: 9/11/2023    Subjective:      Brief HPI:  Ryann Ly is a 63 y.o. male with unknown PMH who presents to [facility] 9/11/2023 for episodes of shortness of breath occurring at night for the past week.  Patient states he has been having some productive cough with clearish-whitish sputum.  States he moved to the [facility] from [facility] about 3 years ago, reports having been seen at an urgent care about a year ago and been started on some unspecified medication for an unspecified problem although has not been taking any medication for some time.  He does report having some reflux symptoms.  Patient denies any chest pain, N/V/D, abdominal pain, leg swelling, dizziness/headache, palpitations, fever, chills, urinary symptoms, hematuria, hemoptysis. Pt states he still makes adequate urine.  Denies any known sick contacts. Despite lab abnormalities patient appears very comfortable, is asking how long he will be staying      In the ED BP was 230/130, with trop elevated at 0.298, BNP 1656. Normal lactate and TSH. Negative flu/covid.     Interval History:     No acute events overnight.  Downgraded from ICU yesterday. Patient was dialyzed yesterday with 1.5 L of net ultrafiltration.  Patient tolerated the dialysis session well without any complications.  Patient denies any complaints today.  He remains hypertensive.     Objective:     Last 24 Hour Vital Signs:  Blood pressure (!) 151/78, pulse 84, temperature 98.6 °F (37 °C), temperature source Oral, resp. rate (!) 21, height 5' 2.01" (1.575 m), weight 53.5 kg (118 lb), SpO2 96 %.    Physical Exam  Constitutional:       General: He is not in acute distress.     Appearance: Normal appearance.   HENT:      Head: Normocephalic and atraumatic.      Right Ear: External ear normal.      Left Ear: External ear normal.   Eyes:      Extraocular " Movements: Extraocular movements intact.   Cardiovascular:      Rate and Rhythm: Normal rate and regular rhythm.      Heart sounds: No murmur heard.     No friction rub. No gallop.   Pulmonary:      Breath sounds: No wheezing, rhonchi or rales.   Abdominal:      General: There is no distension.      Palpations: Abdomen is soft.      Tenderness: There is no abdominal tenderness.   Musculoskeletal:      Right lower leg: No edema.      Left lower leg: No edema.   Skin:     General: Skin is warm and dry.   Neurological:      General: No focal deficit present.      Mental Status: He is alert and oriented to person, place, and time.         Laboratory:  Most Recent Data:    Lab Results   Component Value Date    WBC 7.77 09/12/2023    HGB 8.3 (L) 09/12/2023    HCT 25.9 (L) 09/12/2023    MCV 74.4 (L) 09/12/2023     09/12/2023         BMP  Lab Results   Component Value Date     09/13/2023    K 4.4 09/13/2023    CO2 26 09/13/2023    BUN 50.3 (H) 09/13/2023    CREATININE 7.61 (H) 09/13/2023    CALCIUM 7.4 (L) 09/13/2023       Radiology:  Imaging Results              US Doppler Renal Artery and Vein (xpd) (Final result)  Result time 09/12/23 12:27:06      Final result by Rodrigo Alegria MD (09/12/23 12:27:06)                   Impression:      1. Medical renal disease.  2. No hydronephrosis.  1.    Electronically signed by: Rodrigo Alegria  Date:    09/12/2023  Time:    12:27               Narrative:    EXAMINATION:  US DOPPLER RENAL ARTERY AND VEIN (XPD)    CLINICAL HISTORY:  hypertensive emergency;    COMPARISON:  11 September 2023    FINDINGS:  Grayscale, color and spectral Doppler sonographic evaluation of the kidneys and renal vasculature.    The right kidney measures 8.5 cm. The left kidney measures 8 cm.   No hydronephrosis.  There is increased renal parenchymal echogenicity.  There is a right renal cyst measuring 3-4 cm.  No follow-up is needed.    There are elevated resistive indices in segmental renal  arteries.  No significantly elevated velocities in the main renal arteries.  Renal veins are patent.                                       US Retroperitoneal Complete (Final result)  Result time 09/11/23 15:39:56      Final result by Malcom Castro MD (09/11/23 15:39:56)                   Narrative:    EXAMINATION  US RETROPERITONEAL COMPLETE    CLINICAL HISTORY  rule out hydronephrosis;    TECHNIQUE  Multiplanar grayscale sonographic images were obtained of the retroperitoneum and pelvis.    COMPARISON  None available at the time of initial interpretation.    FINDINGS  Exam quality: adequate for evaluation    Kidneys: The right kidney measures 8.2 cm x 3.4 cm x 4.4 cm, with the left kidney 8.1 cm x 3.9 cm x 3.9 cm.  The bilateral renal parenchyma is echogenic relative to the adjacent liver and spleen.  No solid mass-like lesion or complex cyst is identified.  A simple right lower pole cyst measures up to 3.1 cm in diameter, with additional simple cyst at the left upper renal cortex measuring 1.1 cm.  No collecting system dilatation.  No shadowing calcification is identified.  No perinephric collection or free abdominal fluid.    Bladder: No convincing intraluminal abnormality.  No wall thickening or focal mural lesion.    IMPRESSION  1. Increased echogenicity of the kidneys is nonspecific but suggest element of chronic medical renal disease.  2. Simple bilateral cortical cysts.  3. No evidence of acute urologic abnormality.      Electronically signed by: Malcom Castro  Date:    09/11/2023  Time:    15:39                                     CT Chest Without Contrast (Final result)  Result time 09/11/23 15:38:00      Final result by Malcom Castro MD (09/11/23 15:38:00)                   Narrative:    EXAMINATION  CT CHEST WITHOUT CONTRAST    CLINICAL HISTORY  possible new onset chf vs pneumonia;    TECHNIQUE  Non-contrast helical-acquisition CT images were obtained and multiplanar reformats accomplished by a CT  technologist at a separate workstation, pushed to PACS for physician review.    COMPARISON  Chest radiograph obtained earlier the same day.    FINDINGS  Images were reviewed in lung, soft tissue, and bone windows.    Exam quality: Limited secondary to patient movement throughout image acquisition, with resulting artifact.  Additionally, non-contrast protocol further limits overall diagnostic value.    Lines/tubes: none visualized    Heart chambers are prominent in size.  There is scattered vascular calcification, with no aneurysmal dilatation or other focal abnormality identified.  Hypoattenuation of the blood pool is nonspecific but suggest element of anemia.  There is no significant pericardial fluid.    Central airway patency is widely maintained.  Patchy bilateral multilobar areas of ground-glass airspace attenuation noted, with central predominance.  There are also small volume layering bilateral pleural effusions.  No loculated fluid component is identified.  There is no pneumothorax.    No convincing acute abnormality of the included upper abdominal solid organs.  Simple posterior right renal cortex cyst is incidentally identified.  The thyroid tissue is unremarkable.  No acute or destructive skeletal abnormality is identified.    IMPRESSION  1. Findings suggestive of central vascular congestion and developing pulmonary edema.  2. Small volume bilateral pleural effusions.  3. Nonspecific hypodensity of the blood pool, can be seen with anemia.    RADIATION DOSE  Automated tube current modulation, weight-based exposure dosing, and/or iterative reconstruction technique utilized to reach lowest reasonably achievable exposure rate.    DLP: 203 mGy*cm      Electronically signed by: Malcom Castro  Date:    09/11/2023  Time:    15:38                                     X-Ray Chest 1 View (Final result)  Result time 09/11/23 13:33:20      Final result by Malcom Castro MD (09/11/23 13:33:20)                    Narrative:    EXAMINATION  XR CHEST 1 VIEW    CLINICAL HISTORY  sob;    TECHNIQUE  A total of 1 frontal image(s) of the chest.    COMPARISON  None available at the time of initial interpretation.    FINDINGS  Lines/tubes/devices: none present    The cardiomediastinal silhouette and central pulmonary vasculature are unremarkable for utilized technique.  The trachea is midline.  Ill-defined right perihilar and basilar infiltrate noted, with no organized lobar consolidation identified.  Left lung field is clear.  There is no large pleural effusion or convincing pneumothorax.    There is no acute osseous or extrathoracic abnormality.    IMPRESSION  Ill-defined right perihilar/basilar infiltrate may represent developing atypical infectious process.      Electronically signed by: Malcom Castro  Date:    09/11/2023  Time:    13:33                                      Assessment & Plan:     Acute renal failure  Anion gap metabolic acidosis of uremic etiology, resolved  - BUN//12 upon initial presentation  - nephrology has been consulted.  Patient is pending further workup of his acute renal failure, at this time renal artery ultrasound is negative for renal artery stenosis.  He will eventually need a kidney biopsy  - initiated on hemodialysis 09/12/2023.  Dialysis per nephrology team    Hypertension  - initially requiring nicardipine infusion, which was weaned off on 09/12/2023  - increase carvedilol to 25 mg twice a day, continue bidil as well 3 times per day    HFrEF  NSTEMI, suspect demand ischemia  - LVEF 45% 09/11/2023  - increase carvedilol to 25 mg BID. Continue Bidil. Unable to start ACE/ARB/ARNI, MRA, or SGLT2 given his renal failure  - Troponin peaked at 0.304, which may be confounded by patient's acute renal failure  - Lexiscan has been ordered to evaluate for ischemic cause of patient's cardiomyopathy    Pulmonary Hypertension  - estimated pulmonary artery systolic pressure was 79 on TTE  - CT PE was  negative for pulmonary embolus, only remarkable for pulmonary edema  - will need further workup with outpatient sleep study, and if negative, would benefit from right heart catheterization as outpatient    Disposition:  Continue inpatient management.  Anticipate another dialysis session today.  Lexiscan today as well.  Blood pressure still elevated but improving.      Rodrigo Pelaez MD  [facility] Internal Medicine, PGY3

## 2023-09-13 NOTE — PROCEDURES
"tiffani Ly is a 63 y.o. male patient admitted for NSTEMI and pulmonary edema with acidosis/azotemia. Patient needing HD now for acute renal failure. No previous line. He is on heparin gtt. Platelets WNL. Patient amenable to HD line, consented at bedside using      Temp: 98.2 °F (36.8 °C) (09/12/23 1540)  Pulse: 93 (09/12/23 1540)  Resp: (!) 22 (09/12/23 1540)  BP: (!) 174/94 (09/12/23 1723)  SpO2: (!) 94 % (09/12/23 1540)  Weight: 51.2 kg (112 lb 14 oz) (09/12/23 0540)  Height: 5' 2.01" (157.5 cm) (09/11/23 1821)       Hemodialysis Line    Date/Time: 9/12/2023 7:08 PM    Performed by: Dwight Cochran MD  Authorized by: Dwight Cochran MD    Location procedure was performed:  [facility] INTENSIVE CARE UNIT  Consent Done ?:  Yes  Indications:  Hemodialysis  Preparation:  Skin prepped with ChloraPrep  Location:  Right internal jugular  Catheter size:  14 Fr  Ultrasound guidance: Yes    Needle advanced into vessel with real time ultrasound guidance.    Guidewire confirmed in vessel.    Sterile gel used.  Manometry: No    Number of attempts:  1  Securement:  Line sutured, sterile dressing applied and blood return through all ports  XRay:  Placement verified by x-ray, no pneumothorax on x-ray, tip termination and successful placement  Adverse Events:  None      9/12/2023    Dwight Cochran MD  [facility] General Surgery HO-1  7:09 PM    "

## 2023-09-13 NOTE — MEDICAL/APP STUDENT
"  U Cardiology Progress Note       Cardiology Attending: Dr. Banuelos  Cardiology Fellow: Lizeth Daugherty DO    Date of Admit: 9/11/2023  Date of Note: 9/13/2023    The patient was discussed with Dr. Banuelos agrees with the assessment and plan.    Subjective:      Pt seen and examined at bedside this afternoon. Pt stated that he threw up today when taking his medicine. Pt endorses improvement in breathing and orthopnea, but some SOB still present today. Pt feels much better overall.      Objective:      24-hour Vitals:  Temp:  [98 °F (36.7 °C)-98.6 °F (37 °C)] 98 °F (36.7 °C)  Pulse:  [62-95] 62  Resp:  [16-25] 16  SpO2:  [94 %-97 %] 96 %  BP: (114-190)/() 124/70    Vitals: /70   Pulse 62   Temp 98 °F (36.7 °C) (Oral)   Resp 16   Ht 5' 2" (1.575 m)   Wt 53.5 kg (117 lb 15.1 oz)   SpO2 96%   BMI 21.57 kg/m²     Physical Exam   Constitutional:       Appearance: Normal appearance.   HENT:      Head: Normocephalic and atraumatic.   Eyes:      Conjunctiva/sclera: Conjunctivae normal.   Cardiovascular:      Rate and Rhythm: Normal rate and regular rhythm.      Pulses: Normal pulses.      Heart sounds: Normal heart sounds.   Pulmonary:      Effort: Slight SOB. No respiratory distress.      Breath sounds: Normal breath sounds.   Abdominal:      General: Bowel sounds are normal. There is no distension.      Palpations: Abdomen is soft.   Musculoskeletal:      Right lower leg: No edema.      Left lower leg: No edema.   Skin:     General: Skin is warm and dry.   Neurological:      Mental Status: He is alert. Mental status is at baseline.   Psychiatric:         Mood and Affect: Mood normal.         Thought Content: Thought content normal.         Judgment: Judgment normal.   Current Medications:      Infusions:    Scheduled:   aspirin  81 mg Oral Daily    carvediloL  25 mg Oral BID    heparin (porcine)  5,000 Units Subcutaneous Q12H    hydrALAZINE 10 mg 1 tablet, hydrALAZINE 25 mg 1 tablet, isorsorbide " dinitrate 20 mg 1 tablet combination   Oral TID    mupirocin   Nasal BID    pantoprazole  40 mg Oral QHS     PRN:  dextrose 10%, dextrose 10%, glucagon (human recombinant), glucose, glucose, hydrALAZINE, labetalol, naloxone, sodium chloride 0.9%    Labs:   I have reviewed the following labs below:    Recent Labs   Lab 09/11/23  1556 09/12/23  0602 09/13/23  0808   WBC 6.65 7.77 5.69   HGB 9.1* 8.3* 7.5*   HCT 28.6* 25.9* 23.7*    203 184     Recent Labs   Lab 09/11/23  1738 09/11/23  1850 09/12/23  0602 09/13/23  0315   NA  --  139 141 139   K  --  5.1 4.5 4.4   CO2  --  15* 18* 26   BUN  --  104.0* 101.9* 50.3*   CREATININE  --  12.27* 12.05* 7.61*   CALCIUM  --  7.5* 7.1* 7.4*   MG 1.90  --  1.90 1.70   PHOS 7.1*  --  6.9* 5.3*     Recent Labs   Lab 09/11/23  1850 09/12/23  0602 09/13/23  0315   ALBUMIN 2.6* 2.6* 2.3*   BILITOT 0.4 0.4 0.4   AST 24 19 14   ALT 37 30 21   ALKPHOS 58 53 50     Recent Labs   Lab 09/11/23  1555 09/11/23  2332 09/12/23  0735   PTT 31.3 48.7* 75.6*   INR 1.1  --   --      Recent Labs   Lab 09/11/23  1149 09/11/23  1738 09/11/23  2332 09/12/23  0602   TROPONINI 0.298* 0.304* 0.259* 0.228*   BNP 1,656.2*  --   --   --      Cardiac Studies:   I have reviewed the following studies below:     TTE (9/11/2023):     Left Ventricle: The left ventricle is normal in size. Normal wall thickness. There is mildly reduced systolic function. Biplane (2D) method of discs ejection fraction is 45%.    There is mild  anteroseptal hypokinesis.    Left Atrium: Left atrium is mildly dilated.    Right Ventricle: Normal right ventricular cavity size. Systolic function is normal.    Right Atrium: Right atrium is mildly dilated.    Aortic Valve: The aortic valve is a trileaflet valve. There is normal leaflet mobility.    Mitral Valve: There is trace regurgitation.    Tricuspid Valve: There is mild to moderate regurgitation.    Pulmonary Artery: The estimated pulmonary artery systolic pressure is 79 mmHg.     IVC/SVC: Normal venous pressure at 3 mmHg.    Nuclear Stress Test (9/12/2023):    Normal myocardial perfusion scan. There is no evidence of myocardial ischemia or infarction.    The gated perfusion images showed an ejection fraction of 61% at rest. The gated perfusion images showed an ejection fraction of 55% post stress.    The patient reported no chest pain during the stress test.    There were no arrhythmias during stress.    The patient exercised for 3 minutes 0 seconds on a Jordan protocol, corresponding to a functional capacity of 2 METS, achieving a peak heart rate of 115 bpm, which is 72 % of the age predicted maximum heart rate.    Assessment/Plan:      NSTEMI   - likely demand ischemic    - continue trending troponins for now - although unreliable in the setting of acute renal failure   - unable to perform LHC in the setting of acute renal failure   - stress test conducted 09/12. Normal myocardial perfusion scan with no evidence of ischemia or infarction. No angina during stress test. No arrhythmias during test.      HFrEF   - EF 45% on echo 09/11.    - recommend initiating GDMT when transitioning off of HTN crisis protocol   - Will need follow up in cardiology clinic  - If hypertension remains uncontrolled after starting GDMT, consider working up secondary cause of hypertension in clinic.     Pulmonary HTN   - will need further workup to determine etiology   - recommend V/Q scan and outpatient sleep study    - if above testing is normal - will need outpatient RHC       Thank you for allowing us to participate in the care of this patient. We will sign off for now.     Cj Atwood  [facility] Internal Medicine, MS3  09/13/2023 2:29 PM

## 2023-09-13 NOTE — PT/OT/SLP EVAL
"Physical Therapy Evaluation & Discharge Note    Patient Name:  Ryann Ly   MRN:  [id number]    Recommendations     Discharge Recommendations:  home   Discharge Equipment Recommendations: none   Barriers to discharge: none    Assessment     Ryann Ly is a 63 y.o. male admitted with a medical diagnosis of:  Final diagnoses:  [R06.02] SOB (shortness of breath)  [I16.1] Hypertensive emergency  [N17.9] BIA (acute kidney injury) (Primary)    He presents with the following impairments/functional limitations:  impaired endurance.    Patient does not require further acute PT services.     Recent Surgery: * No surgery found *      Plan     Patient discharged from acute PT Services on 09/13/23.    Based on current functional levels observed, patient does not require further acute PT services.  Re-consult PT Services if patient's status changes and therapy services warranted.    Subjective     Communicated with patient's nurse prior to session.    Patient agreeable to participate in evaluation.     Chief Complaint: "everything is feeling better, I just have low energy."  Patient/Family Comments/goals: "I would like to know when I can go home."  Pain/Comfort:  Pain Rating 1: 0/10  Pain Rating Post-Intervention 1: 0/10    Patients cultural, spiritual, Jew conflicts given the current situation: no    Social History  Living Environment: Patient lives with their Boss  in a single level home, with no steps, with tub-shower combo.  Functional Level: Prior to admission patient was employed, was driving, ambulated without assistive device, was independent in ADL's, and was independent in IADL's.  Equipment at Home: none  Assistance Upon Discharge:  boss .    Objective     Patient found supine in bed and with HOB elevated with peripheral IV, telemetry  upon PT entry to room.    General Precautions: Standard, fall, NPO   Orthopedic Precautions:N/A   Braces: N/A  Respiratory Status: room air    Vitals   At Rest (pre-session) " (asymptomatic)  BP  181/86/ (Rn notified)   HR  90   O2 Sat %  95%     Exams:  Orientation: Patient is oriented to Person, Place, Time, Situation  Commands: Patient follows commands consistently  Gross Motor Coordination:  WFL  Sensation:    -     Intact: light/touch bilat lower extremity  RLE ROM: WFL  RLE Strength: Deficits: 4+/5 MMT strength grossly   LLE ROM: WFL  LLE Strength: Deficits: 4+/5 MMT strength     Functional Mobility:    Bed Mobility:  Supine to Sit: independence    Transfers:  Sit to Stand: supervision with no assistive device  Bed to Chair: supervision with no assistive device using Step Transfer    Gait:  Patient ambulated 260ft with no assistive device and supervision. (Pt noted mild increased fatigue, no LOB noted, good safety awareness) (PT pushed IV pole during gait)  Patient demonstrates steady gait, upright posture, reciprocal arm swing, and no loss of balance.    Other Mobility:  not assessed    Balance:  Sit  Static: GOOD: Takes MODERATE challenges from all directions  Dynamic: GOOD: Maintains balance through MODERATE excursions of active trunk movement  Stand  Static: GOOD: Takes MODERATE challenges from all directions  Dynamic: GOOD: Needs SUPERVISION only during gait and able to self right with moderate LOB    Patient left sitting in chair with all lines intact, call button in reach, tray table at bedside, and RN notified.    Education     White board updated regarding patient's safest level of mobility with staff assistance.    Goals - No STG's or LTG's established secondary to patient was seen for evaluation and discharge     Multidisciplinary Problems       Physical Therapy Goals       Not on file                    History   History reviewed. No pertinent past medical history.  History reviewed. No pertinent surgical history.  Time Tracking     PT Received On: 09/13/23  PT Start Time: 0956     PT Stop Time: 1015  PT Total Time (min): 19 min     Billable Minutes: Evaluation  19 09/13/2023

## 2023-09-13 NOTE — PROGRESS NOTES
Consult received for HH services. Relayed to physician, pt undocumented with no SS # - not eligible for insurance coverage at this time.

## 2023-09-13 NOTE — NURSING
09/12/23 2201   Post-Hemodialysis Assessment   Total UF (mL) 1500 mL   Post-Hemodialysis Comments tolerated hd well. vss throughout. hypertensive actually towards the end of hd. cvc fx well and able to meet ordered bfr. 2 hour run. no issues. no complaints.

## 2023-09-13 NOTE — PROGRESS NOTES
"Nephrology Progress Note    Hospital course:   Patient admitted to ICU for hypertensive emergency placed on nitro drip.  Cardiology consulted for NSTEMI with troponins 0.304, now downtrended.  Overnight, patient placed on Cardene drip due to uncontrolled blood pressure.  Renal indices did not improve with administration of sodium bicarb drip. Underwent first round of HD and downgraded from the ICU on 9/12/23.    Subjective:   Patient downgraded from the ICU yesterday afternoon. Patient had temporary IJ HD placed and patient underwent one round of HD. Patient tolerated HD well, underwent 2 hours and had 1500 mL ultrafiltrated. Planned to get lexiscan later today.    Today, patient feels a lot better and does not have any complaints. He does have some urine output, though not documented- reported 250 mL output yesterday. Denies any chest pain, shortness of breath, lower extremity edema. No other complaints otherwise.     Objective:   BP (!) 151/78   Pulse 84   Temp 98.6 °F (37 °C) (Oral)   Resp (!) 21   Ht 5' 2.01" (1.575 m)   Wt 53.5 kg (118 lb)   SpO2 96%   BMI 21.58 kg/m²     Intake/Output Summary (Last 24 hours) at 9/13/2023 1028  Last data filed at 9/13/2023 0522  Gross per 24 hour   Intake 1323.16 ml   Output 1500 ml   Net -176.84 ml          Physical exam:   Physical Exam  Constitutional:       General: He is not in acute distress.     Appearance: Normal appearance. He is not ill-appearing or toxic-appearing.   HENT:      Mouth/Throat:      Mouth: Mucous membranes are moist.   Eyes:      Extraocular Movements: Extraocular movements intact.   Cardiovascular:      Rate and Rhythm: Normal rate and regular rhythm.      Pulses: Normal pulses.      Heart sounds: Normal heart sounds. No murmur heard.     No friction rub. No gallop.   Pulmonary:      Effort: Pulmonary effort is normal. No respiratory distress.      Breath sounds: Normal breath sounds. No wheezing.   Abdominal:      General: Abdomen is flat. " Bowel sounds are normal. There is no distension.      Palpations: Abdomen is soft. There is no mass.      Tenderness: There is no abdominal tenderness.   Musculoskeletal:         General: Normal range of motion.      Right lower leg: No edema.      Left lower leg: No edema.      Comments: LE edema no longer present   Skin:     General: Skin is warm and dry.      Capillary Refill: Capillary refill takes less than 2 seconds.      Coloration: Skin is not jaundiced or pale.      Findings: No bruising.   Neurological:      General: No focal deficit present.      Mental Status: He is alert.   Psychiatric:         Mood and Affect: Mood normal.         Behavior: Behavior normal.         Thought Content: Thought content normal.         Judgment: Judgment normal.           Laboratory data:   Recent Results (from the past 24 hour(s))   PTT Heparin Monitoring    Collection Time: 09/12/23  2:06 PM   Result Value Ref Range    PTT Heparin Monitor 67.9 seconds   Phosphorus    Collection Time: 09/13/23  3:15 AM   Result Value Ref Range    Phosphorus Level 5.3 (H) 2.3 - 4.7 mg/dL   Magnesium    Collection Time: 09/13/23  3:15 AM   Result Value Ref Range    Magnesium Level 1.70 1.60 - 2.60 mg/dL   Comprehensive Metabolic Panel (CMP)    Collection Time: 09/13/23  3:15 AM   Result Value Ref Range    Sodium Level 139 136 - 145 mmol/L    Potassium Level 4.4 3.5 - 5.1 mmol/L    Chloride 102 98 - 107 mmol/L    Carbon Dioxide 26 23 - 31 mmol/L    Glucose Level 129 (H) 82 - 115 mg/dL    Blood Urea Nitrogen 50.3 (H) 8.4 - 25.7 mg/dL    Creatinine 7.61 (H) 0.73 - 1.18 mg/dL    Calcium Level Total 7.4 (L) 8.8 - 10.0 mg/dL    Protein Total 5.3 (L) 5.8 - 7.6 gm/dL    Albumin Level 2.3 (L) 3.4 - 4.8 g/dL    Globulin 3.0 2.4 - 3.5 gm/dL    Albumin/Globulin Ratio 0.8 (L) 1.1 - 2.0 ratio    Bilirubin Total 0.4 <=1.5 mg/dL    Alkaline Phosphatase 50 40 - 150 unit/L    Alanine Aminotransferase 21 0 - 55 unit/L    Aspartate Aminotransferase 14 5 - 34  unit/L    eGFR 7 mls/min/1.73/m2   PTT Heparin Monitoring    Collection Time: 09/13/23  3:15 AM   Result Value Ref Range    PTT Heparin Monitor 28.6 seconds   ABORH RETYPE    Collection Time: 09/13/23  4:32 AM   Result Value Ref Range    ABORH Retype A POS    Prepare RBC 2 Units; Symptomatic anemia    Collection Time: 09/13/23  8:01 AM   Result Value Ref Range    UNIT NUMBER [id number]     UNIT ABO/RH A POS     DISPENSE STATUS Selected     Unit Expiration [id number]     Product Code K3151X58     Unit Blood Type Code 6200     CROSSMATCH INTERPRETATION Compatible     UNIT NUMBER [id number]     UNIT ABO/RH A POS     DISPENSE STATUS Selected     Unit Expiration [id number]     Product Code I8340H99     Unit Blood Type Code 6200     CROSSMATCH INTERPRETATION Compatible    Type & Screen    Collection Time: 09/13/23  8:01 AM   Result Value Ref Range    Group & Rh A POS     Indirect Meme GEL NEG     Specimen Outdate 09/16/2023 23:59    CBC with Differential    Collection Time: 09/13/23  8:08 AM   Result Value Ref Range    WBC 5.69 4.50 - 11.50 x10(3)/mcL    RBC 3.14 (L) 4.70 - 6.10 x10(6)/mcL    Hgb 7.5 (L) 14.0 - 18.0 g/dL    Hct 23.7 (L) 42.0 - 52.0 %    MCV 75.5 (L) 80.0 - 94.0 fL    MCH 23.9 (L) 27.0 - 31.0 pg    MCHC 31.6 (L) 33.0 - 36.0 g/dL    RDW 14.4 11.5 - 17.0 %    Platelet 184 130 - 400 x10(3)/mcL    MPV 10.2 7.4 - 10.4 fL    Neut % 77.4 %    Lymph % 14.6 %    Mono % 7.0 %    Eos % 0.4 %    Basophil % 0.4 %    Lymph # 0.83 0.6 - 4.6 x10(3)/mcL    Neut # 4.41 2.1 - 9.2 x10(3)/mcL    Mono # 0.40 0.1 - 1.3 x10(3)/mcL    Eos # 0.02 0 - 0.9 x10(3)/mcL    Baso # 0.02 <=0.2 x10(3)/mcL    IG# 0.01 0 - 0.04 x10(3)/mcL    IG% 0.2 %    NRBC% 0.0 %       Imaging:   Imaging Results              US Doppler Renal Artery and Vein (xpd) (Final result)  Result time 09/12/23 12:27:06      Final result by Rodrigo Alegria MD (09/12/23 12:27:06)                   Impression:      1. Medical renal disease.  2. No  hydronephrosis.  1.    Electronically signed by: Rodrigo Alegria  Date:    09/12/2023  Time:    12:27               Narrative:    EXAMINATION:  US DOPPLER RENAL ARTERY AND VEIN (XPD)    CLINICAL HISTORY:  hypertensive emergency;    COMPARISON:  11 September 2023    FINDINGS:  Grayscale, color and spectral Doppler sonographic evaluation of the kidneys and renal vasculature.    The right kidney measures 8.5 cm. The left kidney measures 8 cm.   No hydronephrosis.  There is increased renal parenchymal echogenicity.  There is a right renal cyst measuring 3-4 cm.  No follow-up is needed.    There are elevated resistive indices in segmental renal arteries.  No significantly elevated velocities in the main renal arteries.  Renal veins are patent.                                       US Retroperitoneal Complete (Final result)  Result time 09/11/23 15:39:56      Final result by Malcom Castro MD (09/11/23 15:39:56)                   Narrative:    EXAMINATION  US RETROPERITONEAL COMPLETE    CLINICAL HISTORY  rule out hydronephrosis;    TECHNIQUE  Multiplanar grayscale sonographic images were obtained of the retroperitoneum and pelvis.    COMPARISON  None available at the time of initial interpretation.    FINDINGS  Exam quality: adequate for evaluation    Kidneys: The right kidney measures 8.2 cm x 3.4 cm x 4.4 cm, with the left kidney 8.1 cm x 3.9 cm x 3.9 cm.  The bilateral renal parenchyma is echogenic relative to the adjacent liver and spleen.  No solid mass-like lesion or complex cyst is identified.  A simple right lower pole cyst measures up to 3.1 cm in diameter, with additional simple cyst at the left upper renal cortex measuring 1.1 cm.  No collecting system dilatation.  No shadowing calcification is identified.  No perinephric collection or free abdominal fluid.    Bladder: No convincing intraluminal abnormality.  No wall thickening or focal mural lesion.    IMPRESSION  1. Increased echogenicity of the kidneys is  nonspecific but suggest element of chronic medical renal disease.  2. Simple bilateral cortical cysts.  3. No evidence of acute urologic abnormality.      Electronically signed by: Malcom Castro  Date:    09/11/2023  Time:    15:39                                     CT Chest Without Contrast (Final result)  Result time 09/11/23 15:38:00      Final result by Malcom Castro MD (09/11/23 15:38:00)                   Narrative:    EXAMINATION  CT CHEST WITHOUT CONTRAST    CLINICAL HISTORY  possible new onset chf vs pneumonia;    TECHNIQUE  Non-contrast helical-acquisition CT images were obtained and multiplanar reformats accomplished by a CT technologist at a separate workstation, pushed to PACS for physician review.    COMPARISON  Chest radiograph obtained earlier the same day.    FINDINGS  Images were reviewed in lung, soft tissue, and bone windows.    Exam quality: Limited secondary to patient movement throughout image acquisition, with resulting artifact.  Additionally, non-contrast protocol further limits overall diagnostic value.    Lines/tubes: none visualized    Heart chambers are prominent in size.  There is scattered vascular calcification, with no aneurysmal dilatation or other focal abnormality identified.  Hypoattenuation of the blood pool is nonspecific but suggest element of anemia.  There is no significant pericardial fluid.    Central airway patency is widely maintained.  Patchy bilateral multilobar areas of ground-glass airspace attenuation noted, with central predominance.  There are also small volume layering bilateral pleural effusions.  No loculated fluid component is identified.  There is no pneumothorax.    No convincing acute abnormality of the included upper abdominal solid organs.  Simple posterior right renal cortex cyst is incidentally identified.  The thyroid tissue is unremarkable.  No acute or destructive skeletal abnormality is identified.    IMPRESSION  1. Findings suggestive of  central vascular congestion and developing pulmonary edema.  2. Small volume bilateral pleural effusions.  3. Nonspecific hypodensity of the blood pool, can be seen with anemia.    RADIATION DOSE  Automated tube current modulation, weight-based exposure dosing, and/or iterative reconstruction technique utilized to reach lowest reasonably achievable exposure rate.    DLP: 203 mGy*cm      Electronically signed by: Malcom Castro  Date:    09/11/2023  Time:    15:38                                     X-Ray Chest 1 View (Final result)  Result time 09/11/23 13:33:20      Final result by Malcom Castro MD (09/11/23 13:33:20)                   Narrative:    EXAMINATION  XR CHEST 1 VIEW    CLINICAL HISTORY  sob;    TECHNIQUE  A total of 1 frontal image(s) of the chest.    COMPARISON  None available at the time of initial interpretation.    FINDINGS  Lines/tubes/devices: none present    The cardiomediastinal silhouette and central pulmonary vasculature are unremarkable for utilized technique.  The trachea is midline.  Ill-defined right perihilar and basilar infiltrate noted, with no organized lobar consolidation identified.  Left lung field is clear.  There is no large pleural effusion or convincing pneumothorax.    There is no acute osseous or extrathoracic abnormality.    IMPRESSION  Ill-defined right perihilar/basilar infiltrate may represent developing atypical infectious process.      Electronically signed by: Malcom Castro  Date:    09/11/2023  Time:    13:33                                     Medications:     Current Facility-Administered Medications:     aspirin chewable tablet 81 mg, 81 mg, Oral, Daily, Stroda, Austin, DO, 81 mg at 09/12/23 0836    carvediloL tablet 25 mg, 25 mg, Oral, BID, Rodrigo Pelaez MD    dextrose 10% bolus 125 mL 125 mL, 12.5 g, Intravenous, PRN, Stroda, Austni, DO    dextrose 10% bolus 250 mL 250 mL, 25 g, Intravenous, PRN, Stroda, Austin, DO    glucagon (human recombinant) injection 1 mg, 1 mg,  Intramuscular, PRN, Stroda, Austin, DO    glucose chewable tablet 16 g, 16 g, Oral, PRN, Stroda, Austin, DO    glucose chewable tablet 24 g, 24 g, Oral, PRN, Stroda, Austin, DO    heparin (porcine) injection 5,000 Units, 5,000 Units, Subcutaneous, Q12H, Rodrigo Pelaez MD    hydrALAZINE 10 mg 1 tablet, hydrALAZINE 25 mg 1 tablet, isorsorbide dinitrate 20 mg 1 tablet combination, , Oral, TID, Rodrigo Pelaez MD, Given at 09/12/23 2205    hydrALAZINE injection 10 mg, 10 mg, Intravenous, Q2H PRN, Rodrigo Pelaez MD, 10 mg at 09/13/23 0520    labetalol 20 mg/4 mL (5 mg/mL) IV syring, 10 mg, Intravenous, Q2H PRN, Rodrigo Pelaez MD, 10 mg at 09/13/23 0417    magnesium sulfate 2g in water 50mL IVPB (premix), 2 g, Intravenous, Once, Rodrigo Pelaez MD, Last Rate: 25 mL/hr at 09/13/23 0943, 2 g at 09/13/23 0943    mupirocin 2 % ointment, , Nasal, BID, Raffaele Almodovar MD, Given at 09/12/23 2206    naloxone 0.4 mg/mL injection 0.02 mg, 0.02 mg, Intravenous, PRN, Stroda, Austin, DO    pantoprazole EC tablet 40 mg, 40 mg, Oral, QHS, Stroda, Austin, DO, 40 mg at 09/12/23 2205    sodium chloride 0.9% flush 10 mL, 10 mL, Intravenous, Q12H PRN, Stroda, Austin, DO     Impression/Plan   Nonoliguric Acute renal failure   Anion gap metabolic acidosis  -Without prior kidney disease, likely has above.  -Current workup revealing a nephrotic syndrome type picture without known etiology. Urine protein/Cr ratio is 6.3. Normal complement levels, with normal urine sodium.   -Pending JAMES/ANCA, SPEP/UPEP  -Underwent 1 round of HD yesterday, tolerated well. BUN/Cr now 50.3/7.61. Plans to undergo another session of HD, will receive 2U of pRBCs bc H/H 7.5/23.7  -will CTM H/H  -repeat CMP/mg/phos in the a.m. and will replete electrolytes as needed.  -Will need kidney biopsy in the future, current workup still in process    3. Hypertensive emergency  4. Congestive Heart Failure  5. NSTEMI  -Rest of management per primary team      Eliezer Avila DO  [employer] [facility], PGY-II  [facility]      Addendum:  Patient seen on hemodialysis. Tolerating well.  Brittany Hyman M.D.  Nephrology

## 2023-09-14 LAB
ALBUMIN SERPL-MCNC: 2.4 G/DL (ref 3.4–4.8)
ALBUMIN/GLOB SERPL: 0.8 RATIO (ref 1.1–2)
ALP SERPL-CCNC: 50 UNIT/L (ref 40–150)
ALT SERPL-CCNC: 19 UNIT/L (ref 0–55)
AR ANA INTERPRETIVE COMMENT: NORMAL
AR ANTINUCLEAR ANTIBODY (ANA), HEP-2, IGG: NORMAL
AST SERPL-CCNC: 14 UNIT/L (ref 5–34)
BASOPHILS # BLD AUTO: 0.04 X10(3)/MCL
BASOPHILS NFR BLD AUTO: 0.5 %
BILIRUB SERPL-MCNC: 0.5 MG/DL
BUN SERPL-MCNC: 26.6 MG/DL (ref 8.4–25.7)
C-ANCA TITR SER IF: NEGATIVE {TITER}
CALCIUM SERPL-MCNC: 7.8 MG/DL (ref 8.8–10)
CHLORIDE SERPL-SCNC: 103 MMOL/L (ref 98–107)
CO2 SERPL-SCNC: 24 MMOL/L (ref 23–31)
CREAT SERPL-MCNC: 5.57 MG/DL (ref 0.73–1.18)
EOSINOPHIL # BLD AUTO: 0.16 X10(3)/MCL (ref 0–0.9)
EOSINOPHIL NFR BLD AUTO: 2 %
ERYTHROCYTE [DISTWIDTH] IN BLOOD BY AUTOMATED COUNT: 15.7 % (ref 11.5–17)
GBM QUANT (OHS): <1.5 U/ML
GFR SERPLBLD CREATININE-BSD FMLA CKD-EPI: 11 MLS/MIN/1.73/M2
GLOBULIN SER-MCNC: 3.2 GM/DL (ref 2.4–3.5)
GLUCOSE SERPL-MCNC: 92 MG/DL (ref 82–115)
HCT VFR BLD AUTO: 32.9 % (ref 42–52)
HGB BLD-MCNC: 10.5 G/DL (ref 14–18)
IMM GRANULOCYTES # BLD AUTO: 0.02 X10(3)/MCL (ref 0–0.04)
IMM GRANULOCYTES NFR BLD AUTO: 0.2 %
LYMPHOCYTES # BLD AUTO: 2.3 X10(3)/MCL (ref 0.6–4.6)
LYMPHOCYTES NFR BLD AUTO: 28.6 %
MAGNESIUM SERPL-MCNC: 2 MG/DL (ref 1.6–2.6)
MCH RBC QN AUTO: 24.8 PG (ref 27–31)
MCHC RBC AUTO-ENTMCNC: 31.9 G/DL (ref 33–36)
MCV RBC AUTO: 77.8 FL (ref 80–94)
MONOCYTES # BLD AUTO: 0.93 X10(3)/MCL (ref 0.1–1.3)
MONOCYTES NFR BLD AUTO: 11.6 %
MPO QUANT (OLG): <0.2 U/ML
NEUTROPHILS # BLD AUTO: 4.58 X10(3)/MCL (ref 2.1–9.2)
NEUTROPHILS NFR BLD AUTO: 57.1 %
NRBC BLD AUTO-RTO: 0 %
P-ANCA SER QL IF: NEGATIVE
PHOSPHATE SERPL-MCNC: 5.4 MG/DL (ref 2.3–4.7)
PLATELET # BLD AUTO: 174 X10(3)/MCL (ref 130–400)
PMV BLD AUTO: 9.5 FL (ref 7.4–10.4)
POTASSIUM SERPL-SCNC: 4.9 MMOL/L (ref 3.5–5.1)
PR3 QUANT (OHS): <0.6 U/ML
PROT SERPL-MCNC: 5.6 GM/DL (ref 5.8–7.6)
RBC # BLD AUTO: 4.23 X10(6)/MCL (ref 4.7–6.1)
SODIUM SERPL-SCNC: 135 MMOL/L (ref 136–145)
WBC # SPEC AUTO: 8.03 X10(3)/MCL (ref 4.5–11.5)

## 2023-09-14 PROCEDURE — 21400001 HC TELEMETRY ROOM

## 2023-09-14 PROCEDURE — 85025 COMPLETE CBC W/AUTO DIFF WBC: CPT | Performed by: STUDENT IN AN ORGANIZED HEALTH CARE EDUCATION/TRAINING PROGRAM

## 2023-09-14 PROCEDURE — 63600175 PHARM REV CODE 636 W HCPCS: Performed by: STUDENT IN AN ORGANIZED HEALTH CARE EDUCATION/TRAINING PROGRAM

## 2023-09-14 PROCEDURE — 25000003 PHARM REV CODE 250

## 2023-09-14 PROCEDURE — 83735 ASSAY OF MAGNESIUM: CPT

## 2023-09-14 PROCEDURE — 25000003 PHARM REV CODE 250: Performed by: STUDENT IN AN ORGANIZED HEALTH CARE EDUCATION/TRAINING PROGRAM

## 2023-09-14 PROCEDURE — 80053 COMPREHEN METABOLIC PANEL: CPT

## 2023-09-14 PROCEDURE — 84100 ASSAY OF PHOSPHORUS: CPT

## 2023-09-14 RX ORDER — HYDRALAZINE HYDROCHLORIDE 10 MG/1
10 TABLET, FILM COATED ORAL ONCE
Status: DISCONTINUED | OUTPATIENT
Start: 2023-09-14 | End: 2023-09-14

## 2023-09-14 RX ORDER — HYDRALAZINE HYDROCHLORIDE 50 MG/1
50 TABLET, FILM COATED ORAL 3 TIMES DAILY
Status: DISCONTINUED | OUTPATIENT
Start: 2023-09-14 | End: 2023-09-16

## 2023-09-14 RX ORDER — ISOSORBIDE DINITRATE 20 MG/1
20 TABLET ORAL 3 TIMES DAILY
Status: DISCONTINUED | OUTPATIENT
Start: 2023-09-14 | End: 2023-09-20 | Stop reason: HOSPADM

## 2023-09-14 RX ORDER — HYDRALAZINE HYDROCHLORIDE 25 MG/1
25 TABLET, FILM COATED ORAL ONCE
Status: DISCONTINUED | OUTPATIENT
Start: 2023-09-14 | End: 2023-09-14

## 2023-09-14 RX ADMIN — ISOSORBIDE DINITRATE 20 MG: 20 TABLET ORAL at 03:09

## 2023-09-14 RX ADMIN — CARVEDILOL 25 MG: 12.5 TABLET, FILM COATED ORAL at 08:09

## 2023-09-14 RX ADMIN — HYDRALAZINE HYDROCHLORIDE 10 MG: 20 INJECTION INTRAMUSCULAR; INTRAVENOUS at 05:09

## 2023-09-14 RX ADMIN — ISOSORBIDE DINITRATE: 20 TABLET ORAL at 08:09

## 2023-09-14 RX ADMIN — HYDRALAZINE HYDROCHLORIDE 50 MG: 50 TABLET ORAL at 03:09

## 2023-09-14 RX ADMIN — PANTOPRAZOLE SODIUM 40 MG: 40 TABLET, DELAYED RELEASE ORAL at 08:09

## 2023-09-14 RX ADMIN — HYDRALAZINE HYDROCHLORIDE 50 MG: 50 TABLET ORAL at 08:09

## 2023-09-14 RX ADMIN — ISOSORBIDE DINITRATE 20 MG: 20 TABLET ORAL at 08:09

## 2023-09-14 RX ADMIN — HEPARIN SODIUM 5000 UNITS: 5000 INJECTION, SOLUTION INTRAVENOUS; SUBCUTANEOUS at 08:09

## 2023-09-14 RX ADMIN — MUPIROCIN: 20 OINTMENT TOPICAL at 08:09

## 2023-09-14 RX ADMIN — ASPIRIN 81 MG CHEWABLE TABLET 81 MG: 81 TABLET CHEWABLE at 08:09

## 2023-09-14 NOTE — PROGRESS NOTES
Inpatient Nutrition Assessment    Admit Date: 9/11/2023   Total duration of encounter: 3 days     Nutrition Recommendation/Prescription     Continue  renal diet--60 gm protein  Order novasource renal bid; Novasource Renal (provides 475 kcal, 22 g protein per serving)   Consider phosphate binder /elevated P level  MVI/fe  Biweekly wt  Will monitor nutrition status     Communication of Recommendations: reviewed with nurse    Nutrition Assessment     Malnutrition Assessment/Nutrition-Focused Physical Exam    Malnutrition Context: acute illness or injury  Malnutrition Level:  (pt does not meet malnutrition criteria)                    A minimum of two characteristics is recommended for diagnosis of either severe or non-severe malnutrition.    Chart Review    Reason Seen: continuous nutrition monitoring and follow-up    Malnutrition Screening Tool Results   Have you recently lost weight without trying?: No  Have you been eating poorly because of a decreased appetite?: No   MST Score: 0     Diagnosis:  Acute renal failure, metabolic acidosis, HTN, Chf, NSTEMI ; nephrotic syndrome     Relevant Medical History: none    Nutrition-Related Medications:  aspirin, pantoprazole   Calorie Containing IV Medications: no significant kcals from medications at this time    Nutrition-Related Labs:  (9-12) H/ 8.3/25.9(L) Gluc 102 Bun 101.9(H) Cr 12.0(H) GFR 4(L) K 4.5 P 6.9(H)   (9/14) H/H 10.5/32.9(L) Gluc 92 bun 26.6(H) Cr 5.5(H) GFR 11 P 5.4(H) K 4.9     Diet/PN Order: DIET RENAL ON DIALYSIS  Oral Supplement Order: none  Tube Feeding Order: none  Appetite/Oral Intake: good/% of meals  Factors Affecting Nutritional Intake: NPO  Food/Holiness/Cultural Preferences: none reported  Food Allergies: none reported    Skin Integrity: intact  Wound(s):   none     Comments  (9/14) Pt reported eating ok; no N/V; good appetite; wt elevated 53.3kg. Nephrology team treating renal failure; pt remains on HD. Current renal diet appropriate; will  "order oral supplement.     () Pt having ultrasound during rounds; spoke to nurse caring for pt--pt to have multiple test today. Pt to have HD access placed; agreed to dialysis. Unable to obtain diet/wt hx at this time. Suggest renal diet/ oral supplement when able to advance diet. Abnormal labs noted: Bun/Cr (H)--reflective renal dysfunction. P (H)--suggest phosphate binder.     Anthropometrics    Height: 5' 2" (157.5 cm)    Last Weight: 53.3 kg (117 lb 9.6 oz) (23 0500) Weight Method: Bed Scale  BMI (Calculated): 21.5  BMI Classification: normal (BMI 18.5-24.9)        Ideal Body Weight (IBW), Male: 118 lb     % Ideal Body Weight, Male (lb): 99.96 %                 Usual Body Weight (UBW), kg:  (unable to obtain)        Usual Weight Provided By: EMR weight history    Wt Readings from Last 5 Encounters:   23 53.3 kg (117 lb 9.6 oz)     Weight Change(s) Since Admission:  Admit Weight: 51.7 kg (113 lb 15.7 oz) (23 1117)      Estimated Needs    Weight Used For Calorie Calculations: 51.2 kg (112 lb 14 oz)  Energy Calorie Requirements (kcal): 1536 kcal/d; 30 coby/kg  Energy Need Method: Kcal/kg  Weight Used For Protein Calculations: 51.2 kg (112 lb 14 oz)  Protein Requirements: 61 gm protein/d; 1.2 gm/kg ARF/ to begin HD  Fluid Requirements (mL): 1536 ml/d; 1ml/coby  Temp (24hrs), Av.2 °F (36.8 °C), Min:97.8 °F (36.6 °C), Max:98.6 °F (37 °C)       Enteral Nutrition    Patient not receiving enteral nutrition at this time.    Parenteral Nutrition    Patient not receiving parenteral nutrition support at this time.    Evaluation of Received Nutrient Intake    Calories: meeting estimated needs  Protein: meeting estimated needs    Patient Education    Not applicable.    Nutrition Diagnosis     PES: Inadequate oral intake related to acute illness as evidenced by NPO. (resolved)    Interventions/Goals     Intervention(s): modified composition of meals/snacks, commercial beverage, multivitamin/mineral " supplement therapy, and collaboration with other providers  Goal: Meet greater than 75% of nutritional needs by follow-up. (goal progressing)    Monitoring & Evaluation     Dietitian will monitor food and beverage intake, weight, and electrolyte/renal panel.  Nutrition Risk/Follow-Up: moderate (follow-up in 3-5 days)   Please consult if re-assessment needed sooner.

## 2023-09-14 NOTE — PROGRESS NOTES
"Nephrology Progress Note    Hospital course:   Patient admitted to ICU for hypertensive emergency placed on nitro drip.  Cardiology consulted for NSTEMI with troponins 0.304, now downtrended.  Overnight, patient placed on Cardene drip due to uncontrolled blood pressure.  Renal indices did not improve with administration of sodium bicarb drip. Underwent first round of HD and downgraded from the ICU on 9/12/23.    Subjective:   NAEON.  No acute complaints this morning.  Feels well.  Urinated 300 mL yesterday.  No other issues overnight.  Denies any chest pain/shortness of breath/lower extremity edema.    ROS:  Otherwise negative    Objective:   /71   Pulse 69   Temp 98.5 °F (36.9 °C) (Oral)   Resp 18   Ht 5' 2" (1.575 m)   Wt 53.3 kg (117 lb 9.6 oz)   SpO2 97%   BMI 21.51 kg/m²     Intake/Output Summary (Last 24 hours) at 9/14/2023 1130  Last data filed at 9/14/2023 0729  Gross per 24 hour   Intake 1197.66 ml   Output 1900 ml   Net -702.34 ml          Physical exam:   Physical Exam  Constitutional:       General: He is not in acute distress.     Appearance: Normal appearance. He is not ill-appearing or toxic-appearing.   HENT:      Mouth/Throat:      Mouth: Mucous membranes are moist.   Eyes:      Extraocular Movements: Extraocular movements intact.   Cardiovascular:      Rate and Rhythm: Normal rate and regular rhythm.      Pulses: Normal pulses.      Heart sounds: Normal heart sounds. No murmur heard.     No friction rub. No gallop.   Pulmonary:      Effort: Pulmonary effort is normal. No respiratory distress.      Breath sounds: Normal breath sounds. No wheezing.   Abdominal:      General: Abdomen is flat. Bowel sounds are normal. There is no distension.      Palpations: Abdomen is soft. There is no mass.      Tenderness: There is no abdominal tenderness.   Musculoskeletal:         General: Normal range of motion.      Right lower leg: No edema.      Left lower leg: No edema.      Comments: LE edema no " longer present   Skin:     General: Skin is warm and dry.      Capillary Refill: Capillary refill takes less than 2 seconds.      Coloration: Skin is not jaundiced or pale.      Findings: No bruising.   Neurological:      General: No focal deficit present.      Mental Status: He is alert.   Psychiatric:         Mood and Affect: Mood normal.         Behavior: Behavior normal.         Thought Content: Thought content normal.         Judgment: Judgment normal.           Laboratory data:   Recent Results (from the past 24 hour(s))   Phosphorus    Collection Time: 09/14/23  3:10 AM   Result Value Ref Range    Phosphorus Level 5.4 (H) 2.3 - 4.7 mg/dL   Magnesium    Collection Time: 09/14/23  3:10 AM   Result Value Ref Range    Magnesium Level 2.00 1.60 - 2.60 mg/dL   Comprehensive Metabolic Panel (CMP)    Collection Time: 09/14/23  3:10 AM   Result Value Ref Range    Sodium Level 135 (L) 136 - 145 mmol/L    Potassium Level 4.9 3.5 - 5.1 mmol/L    Chloride 103 98 - 107 mmol/L    Carbon Dioxide 24 23 - 31 mmol/L    Glucose Level 92 82 - 115 mg/dL    Blood Urea Nitrogen 26.6 (H) 8.4 - 25.7 mg/dL    Creatinine 5.57 (H) 0.73 - 1.18 mg/dL    Calcium Level Total 7.8 (L) 8.8 - 10.0 mg/dL    Protein Total 5.6 (L) 5.8 - 7.6 gm/dL    Albumin Level 2.4 (L) 3.4 - 4.8 g/dL    Globulin 3.2 2.4 - 3.5 gm/dL    Albumin/Globulin Ratio 0.8 (L) 1.1 - 2.0 ratio    Bilirubin Total 0.5 <=1.5 mg/dL    Alkaline Phosphatase 50 40 - 150 unit/L    Alanine Aminotransferase 19 0 - 55 unit/L    Aspartate Aminotransferase 14 5 - 34 unit/L    eGFR 11 mls/min/1.73/m2   CBC with Differential    Collection Time: 09/14/23  3:10 AM   Result Value Ref Range    WBC 8.03 4.50 - 11.50 x10(3)/mcL    RBC 4.23 (L) 4.70 - 6.10 x10(6)/mcL    Hgb 10.5 (L) 14.0 - 18.0 g/dL    Hct 32.9 (L) 42.0 - 52.0 %    MCV 77.8 (L) 80.0 - 94.0 fL    MCH 24.8 (L) 27.0 - 31.0 pg    MCHC 31.9 (L) 33.0 - 36.0 g/dL    RDW 15.7 11.5 - 17.0 %    Platelet 174 130 - 400 x10(3)/mcL    MPV 9.5  7.4 - 10.4 fL    Neut % 57.1 %    Lymph % 28.6 %    Mono % 11.6 %    Eos % 2.0 %    Basophil % 0.5 %    Lymph # 2.30 0.6 - 4.6 x10(3)/mcL    Neut # 4.58 2.1 - 9.2 x10(3)/mcL    Mono # 0.93 0.1 - 1.3 x10(3)/mcL    Eos # 0.16 0 - 0.9 x10(3)/mcL    Baso # 0.04 <=0.2 x10(3)/mcL    IG# 0.02 0 - 0.04 x10(3)/mcL    IG% 0.2 %    NRBC% 0.0 %       Imaging:   Imaging Results              US Doppler Renal Artery and Vein (xpd) (Final result)  Result time 09/12/23 12:27:06      Final result by Rodrigo Alegria MD (09/12/23 12:27:06)                   Impression:      1. Medical renal disease.  2. No hydronephrosis.  1.    Electronically signed by: Rodrigo Alegria  Date:    09/12/2023  Time:    12:27               Narrative:    EXAMINATION:  US DOPPLER RENAL ARTERY AND VEIN (XPD)    CLINICAL HISTORY:  hypertensive emergency;    COMPARISON:  11 September 2023    FINDINGS:  Grayscale, color and spectral Doppler sonographic evaluation of the kidneys and renal vasculature.    The right kidney measures 8.5 cm. The left kidney measures 8 cm.   No hydronephrosis.  There is increased renal parenchymal echogenicity.  There is a right renal cyst measuring 3-4 cm.  No follow-up is needed.    There are elevated resistive indices in segmental renal arteries.  No significantly elevated velocities in the main renal arteries.  Renal veins are patent.                                       US Retroperitoneal Complete (Final result)  Result time 09/11/23 15:39:56      Final result by aMlcom Castro MD (09/11/23 15:39:56)                   Narrative:    EXAMINATION  US RETROPERITONEAL COMPLETE    CLINICAL HISTORY  rule out hydronephrosis;    TECHNIQUE  Multiplanar grayscale sonographic images were obtained of the retroperitoneum and pelvis.    COMPARISON  None available at the time of initial interpretation.    FINDINGS  Exam quality: adequate for evaluation    Kidneys: The right kidney measures 8.2 cm x 3.4 cm x 4.4 cm, with the left kidney 8.1 cm  x 3.9 cm x 3.9 cm.  The bilateral renal parenchyma is echogenic relative to the adjacent liver and spleen.  No solid mass-like lesion or complex cyst is identified.  A simple right lower pole cyst measures up to 3.1 cm in diameter, with additional simple cyst at the left upper renal cortex measuring 1.1 cm.  No collecting system dilatation.  No shadowing calcification is identified.  No perinephric collection or free abdominal fluid.    Bladder: No convincing intraluminal abnormality.  No wall thickening or focal mural lesion.    IMPRESSION  1. Increased echogenicity of the kidneys is nonspecific but suggest element of chronic medical renal disease.  2. Simple bilateral cortical cysts.  3. No evidence of acute urologic abnormality.      Electronically signed by: Malcom Castro  Date:    09/11/2023  Time:    15:39                                     CT Chest Without Contrast (Final result)  Result time 09/11/23 15:38:00      Final result by Malcom Castro MD (09/11/23 15:38:00)                   Narrative:    EXAMINATION  CT CHEST WITHOUT CONTRAST    CLINICAL HISTORY  possible new onset chf vs pneumonia;    TECHNIQUE  Non-contrast helical-acquisition CT images were obtained and multiplanar reformats accomplished by a CT technologist at a separate workstation, pushed to PACS for physician review.    COMPARISON  Chest radiograph obtained earlier the same day.    FINDINGS  Images were reviewed in lung, soft tissue, and bone windows.    Exam quality: Limited secondary to patient movement throughout image acquisition, with resulting artifact.  Additionally, non-contrast protocol further limits overall diagnostic value.    Lines/tubes: none visualized    Heart chambers are prominent in size.  There is scattered vascular calcification, with no aneurysmal dilatation or other focal abnormality identified.  Hypoattenuation of the blood pool is nonspecific but suggest element of anemia.  There is no significant pericardial  fluid.    Central airway patency is widely maintained.  Patchy bilateral multilobar areas of ground-glass airspace attenuation noted, with central predominance.  There are also small volume layering bilateral pleural effusions.  No loculated fluid component is identified.  There is no pneumothorax.    No convincing acute abnormality of the included upper abdominal solid organs.  Simple posterior right renal cortex cyst is incidentally identified.  The thyroid tissue is unremarkable.  No acute or destructive skeletal abnormality is identified.    IMPRESSION  1. Findings suggestive of central vascular congestion and developing pulmonary edema.  2. Small volume bilateral pleural effusions.  3. Nonspecific hypodensity of the blood pool, can be seen with anemia.    RADIATION DOSE  Automated tube current modulation, weight-based exposure dosing, and/or iterative reconstruction technique utilized to reach lowest reasonably achievable exposure rate.    DLP: 203 mGy*cm      Electronically signed by: Malcom Castro  Date:    09/11/2023  Time:    15:38                                     X-Ray Chest 1 View (Final result)  Result time 09/11/23 13:33:20      Final result by Malcom Castro MD (09/11/23 13:33:20)                   Narrative:    EXAMINATION  XR CHEST 1 VIEW    CLINICAL HISTORY  sob;    TECHNIQUE  A total of 1 frontal image(s) of the chest.    COMPARISON  None available at the time of initial interpretation.    FINDINGS  Lines/tubes/devices: none present    The cardiomediastinal silhouette and central pulmonary vasculature are unremarkable for utilized technique.  The trachea is midline.  Ill-defined right perihilar and basilar infiltrate noted, with no organized lobar consolidation identified.  Left lung field is clear.  There is no large pleural effusion or convincing pneumothorax.    There is no acute osseous or extrathoracic abnormality.    IMPRESSION  Ill-defined right perihilar/basilar infiltrate may represent  developing atypical infectious process.      Electronically signed by: Malcom Castro  Date:    09/11/2023  Time:    13:33                                     Medications:     Current Facility-Administered Medications:     aspirin chewable tablet 81 mg, 81 mg, Oral, Daily, Stroda, Austin, DO, 81 mg at 09/14/23 0812    carvediloL tablet 25 mg, 25 mg, Oral, BID, Rodrigo Pelaez MD, 25 mg at 09/14/23 0812    dextrose 10% bolus 125 mL 125 mL, 12.5 g, Intravenous, PRN, Stroda, Austin, DO    dextrose 10% bolus 250 mL 250 mL, 25 g, Intravenous, PRN, Stroda, Austin, DO    glucagon (human recombinant) injection 1 mg, 1 mg, Intramuscular, PRN, Stroda, Austin, DO    glucose chewable tablet 16 g, 16 g, Oral, PRN, Stroda, Austin, DO    glucose chewable tablet 24 g, 24 g, Oral, PRN, Stroda, Austin, DO    heparin (porcine) injection 5,000 Units, 5,000 Units, Subcutaneous, Q12H, Rodrigo Pelaez MD, 5,000 Units at 09/14/23 0812    hydrALAZINE injection 10 mg, 10 mg, Intravenous, Q2H PRN, Rodrigo Pelaez MD, 10 mg at 09/13/23 0520    hydrALAZINE tablet 50 mg, 50 mg, Oral, TID, Abner Weaver MD    isosorbide dinitrate tablet 20 mg, 20 mg, Oral, TID, Abner Weaver MD    labetalol 20 mg/4 mL (5 mg/mL) IV syring, 10 mg, Intravenous, Q2H PRN, Rodrigo Pelaez MD, 10 mg at 09/13/23 0417    mupirocin 2 % ointment, , Nasal, BID, Raffaele Almodovar MD, Given at 09/14/23 0816    naloxone 0.4 mg/mL injection 0.02 mg, 0.02 mg, Intravenous, PRN, Stroda, Austin, DO    pantoprazole EC tablet 40 mg, 40 mg, Oral, QHS, Stroda, Austin, DO, 40 mg at 09/13/23 2151    sodium chloride 0.9% flush 10 mL, 10 mL, Intravenous, Q12H PRN, Stroda, Austin, DO     Impression/Plan   Nonoliguric Acute renal failure   Anion gap metabolic acidosis  -Without prior kidney disease, likely has above.  -Current workup revealing a nephrotic syndrome type picture without known etiology. Urine protein/Cr ratio is 6.3. Normal complement levels, with normal urine sodium.   -Pending JAMES/ANCA,  SPEP/UPEP  -underwent another round of HD yesterday, 3 hours total with 1600 mL ultrafiltrated.  Kidney function is not improving as much as we would hope.  We will hold HD today to see if kidney function has recovered.  If renal indices do not improve drastically tomorrow, he will need a tunneled HD catheter placed.  We will also need case management to start looking for dialysis placement  -will CTM H/H  -repeat CMP/mg/phos in the a.m. and will replete electrolytes as needed.  -Will need kidney biopsy in the future, current workup still in process    3. Hypertensive emergency  4. Congestive Heart Failure  5. NSTEMI  -Rest of management per primary team      Eliezer Avila DO  [facility] Internal Medicine, PGY-II  [facility]

## 2023-09-14 NOTE — PROGRESS NOTES
Contacted by physician re setting up OP Dialysis. Pt is undocumented immigrant from [place] with no SS #; therefore, ineligible for M/D at this time. Pt will need to come through ED for routine dialysis until able to obtain insurance coverage.

## 2023-09-14 NOTE — PROGRESS NOTES
"[facility] Medicine Wards Progress Note     Provider Team: Internal Medicine List 1  Attending: Blanca Andres MD  Resident: Austin Giraldo DO    Date of Admit: 9/11/2023    Subjective:      Brief HPI:  Ryann Ly is a 63 y.o. male with unknown PMH who presents to [facility] 9/11/2023 for episodes of shortness of breath occurring at night for the past week.  Patient states he has been having some productive cough with clearish-whitish sputum.  States he moved to the [facility] from [facility] about 3 years ago, reports having been seen at an urgent care about a year ago and been started on some unspecified medication for an unspecified problem although has not been taking any medication for some time.  He does report having some reflux symptoms.  Patient denies any chest pain, N/V/D, abdominal pain, leg swelling, dizziness/headache, palpitations, fever, chills, urinary symptoms, hematuria, hemoptysis. Pt states he still makes adequate urine.  Denies any known sick contacts. Despite lab abnormalities patient appears very comfortable, is asking how long he will be staying      In the ED BP was 230/130, with trop elevated at 0.298, BNP 1656. Normal lactate and TSH. Negative flu/covid.     Interval History:     No acute events overnight.  . Patient was dialyzed yesterday with 1.6+ L of net ultrafiltration and was transfused 2 units.  Patient denies any complaints today.  He remains hypertensive.  His hydralazine to 50 t.i.d., and did in started isosorbide dinitrate tablets 20 mg t.i.d. Lexiscan done yesterday was normal, low risk study.     Objective:     Last 24 Hour Vital Signs:  Blood pressure (!) 158/80, pulse 68, temperature 98.5 °F (36.9 °C), temperature source Oral, resp. rate 18, height 5' 2" (1.575 m), weight 53.3 kg (117 lb 9.6 oz), SpO2 97 %.    Physical Exam  Constitutional:       General: He is not in acute distress.     Appearance: Normal appearance.   HENT:      Head: Normocephalic and atraumatic.      Right Ear: External " ear normal.      Left Ear: External ear normal.   Eyes:      Extraocular Movements: Extraocular movements intact.   Cardiovascular:      Rate and Rhythm: Normal rate and regular rhythm.      Heart sounds: No murmur heard.     No friction rub. No gallop.   Pulmonary:      Breath sounds: No wheezing, rhonchi or rales.   Abdominal:      General: There is no distension.      Palpations: Abdomen is soft.      Tenderness: There is no abdominal tenderness.   Musculoskeletal:      Right lower leg: No edema.      Left lower leg: No edema.   Skin:     General: Skin is warm and dry.   Neurological:      General: No focal deficit present.      Mental Status: He is alert and oriented to person, place, and time.         Laboratory:  Most Recent Data:    Lab Results   Component Value Date    WBC 8.03 09/14/2023    HGB 10.5 (L) 09/14/2023    HCT 32.9 (L) 09/14/2023    MCV 77.8 (L) 09/14/2023     09/14/2023         BMP  Lab Results   Component Value Date     (L) 09/14/2023    K 4.9 09/14/2023    CO2 24 09/14/2023    BUN 26.6 (H) 09/14/2023    CREATININE 5.57 (H) 09/14/2023    CALCIUM 7.8 (L) 09/14/2023       Radiology:  Imaging Results              US Doppler Renal Artery and Vein (xpd) (Final result)  Result time 09/12/23 12:27:06      Final result by Rodrigo Alegria MD (09/12/23 12:27:06)                   Impression:      1. Medical renal disease.  2. No hydronephrosis.  1.    Electronically signed by: Rodrigo Alegria  Date:    09/12/2023  Time:    12:27               Narrative:    EXAMINATION:  US DOPPLER RENAL ARTERY AND VEIN (XPD)    CLINICAL HISTORY:  hypertensive emergency;    COMPARISON:  11 September 2023    FINDINGS:  Grayscale, color and spectral Doppler sonographic evaluation of the kidneys and renal vasculature.    The right kidney measures 8.5 cm. The left kidney measures 8 cm.   No hydronephrosis.  There is increased renal parenchymal echogenicity.  There is a right renal cyst measuring 3-4 cm.  No  follow-up is needed.    There are elevated resistive indices in segmental renal arteries.  No significantly elevated velocities in the main renal arteries.  Renal veins are patent.                                       US Retroperitoneal Complete (Final result)  Result time 09/11/23 15:39:56      Final result by Malcom Castro MD (09/11/23 15:39:56)                   Narrative:    EXAMINATION  US RETROPERITONEAL COMPLETE    CLINICAL HISTORY  rule out hydronephrosis;    TECHNIQUE  Multiplanar grayscale sonographic images were obtained of the retroperitoneum and pelvis.    COMPARISON  None available at the time of initial interpretation.    FINDINGS  Exam quality: adequate for evaluation    Kidneys: The right kidney measures 8.2 cm x 3.4 cm x 4.4 cm, with the left kidney 8.1 cm x 3.9 cm x 3.9 cm.  The bilateral renal parenchyma is echogenic relative to the adjacent liver and spleen.  No solid mass-like lesion or complex cyst is identified.  A simple right lower pole cyst measures up to 3.1 cm in diameter, with additional simple cyst at the left upper renal cortex measuring 1.1 cm.  No collecting system dilatation.  No shadowing calcification is identified.  No perinephric collection or free abdominal fluid.    Bladder: No convincing intraluminal abnormality.  No wall thickening or focal mural lesion.    IMPRESSION  1. Increased echogenicity of the kidneys is nonspecific but suggest element of chronic medical renal disease.  2. Simple bilateral cortical cysts.  3. No evidence of acute urologic abnormality.      Electronically signed by: Malcom Castro  Date:    09/11/2023  Time:    15:39                                     CT Chest Without Contrast (Final result)  Result time 09/11/23 15:38:00      Final result by Malcom Castro MD (09/11/23 15:38:00)                   Narrative:    EXAMINATION  CT CHEST WITHOUT CONTRAST    CLINICAL HISTORY  possible new onset chf vs pneumonia;    TECHNIQUE  Non-contrast  helical-acquisition CT images were obtained and multiplanar reformats accomplished by a CT technologist at a separate workstation, pushed to PACS for physician review.    COMPARISON  Chest radiograph obtained earlier the same day.    FINDINGS  Images were reviewed in lung, soft tissue, and bone windows.    Exam quality: Limited secondary to patient movement throughout image acquisition, with resulting artifact.  Additionally, non-contrast protocol further limits overall diagnostic value.    Lines/tubes: none visualized    Heart chambers are prominent in size.  There is scattered vascular calcification, with no aneurysmal dilatation or other focal abnormality identified.  Hypoattenuation of the blood pool is nonspecific but suggest element of anemia.  There is no significant pericardial fluid.    Central airway patency is widely maintained.  Patchy bilateral multilobar areas of ground-glass airspace attenuation noted, with central predominance.  There are also small volume layering bilateral pleural effusions.  No loculated fluid component is identified.  There is no pneumothorax.    No convincing acute abnormality of the included upper abdominal solid organs.  Simple posterior right renal cortex cyst is incidentally identified.  The thyroid tissue is unremarkable.  No acute or destructive skeletal abnormality is identified.    IMPRESSION  1. Findings suggestive of central vascular congestion and developing pulmonary edema.  2. Small volume bilateral pleural effusions.  3. Nonspecific hypodensity of the blood pool, can be seen with anemia.    RADIATION DOSE  Automated tube current modulation, weight-based exposure dosing, and/or iterative reconstruction technique utilized to reach lowest reasonably achievable exposure rate.    DLP: 203 mGy*cm      Electronically signed by: Malcom Castro  Date:    09/11/2023  Time:    15:38                                     X-Ray Chest 1 View (Final result)  Result time 09/11/23  13:33:20      Final result by Malcom Castro MD (09/11/23 13:33:20)                   Narrative:    EXAMINATION  XR CHEST 1 VIEW    CLINICAL HISTORY  sob;    TECHNIQUE  A total of 1 frontal image(s) of the chest.    COMPARISON  None available at the time of initial interpretation.    FINDINGS  Lines/tubes/devices: none present    The cardiomediastinal silhouette and central pulmonary vasculature are unremarkable for utilized technique.  The trachea is midline.  Ill-defined right perihilar and basilar infiltrate noted, with no organized lobar consolidation identified.  Left lung field is clear.  There is no large pleural effusion or convincing pneumothorax.    There is no acute osseous or extrathoracic abnormality.    IMPRESSION  Ill-defined right perihilar/basilar infiltrate may represent developing atypical infectious process.      Electronically signed by: Malcom Castro  Date:    09/11/2023  Time:    13:33                                      Assessment & Plan:     Acute renal failure  Anion gap metabolic acidosis of uremic etiology, resolved  - BUN//12 upon initial presentation  - nephrology has been consulted.  Patient is pending further workup of his acute renal failure, at this time renal artery ultrasound is negative for renal artery stenosis.  He will eventually need a kidney biopsy  - initiated on hemodialysis 09/12/2023.  Dialysis per nephrology team  - pending JAMES/Anca, SPEP/UPEP      Hypertension  - initially requiring nicardipine infusion, which was weaned off on 09/12/2023  - carvedilol to 25 mg BID, hydralazine 50 t.i.d., isosorbide dinitrate 20 mg t.i.d.     HFrEF  NSTEMI, suspect demand ischemia  - LVEF 45% 09/11/2023  - carvedilol to 25 mg BID, hydralazine 50 t.i.d., isosorbide dinitrate 20 mg t.i.d. Unable to start ACE/ARB/ARNI, MRA, or SGLT2 given his renal failure  - Troponin peaked at 0.304, which may be confounded by patient's acute renal failure  - Lexiscan was done 09/13/2023, normal  scan low risk study.  Unable to perform LHC in the setting of acute renal failure.     Pulmonary Hypertension  - estimated pulmonary artery systolic pressure was 79 on TTE  - CT PE was negative for pulmonary embolus, only remarkable for pulmonary edema  - will need further workup with outpatient sleep study, and if negative, would benefit from right heart catheterization as outpatient    Disposition:  Continue inpatient management.  Blood pressure still elevated but improving.  Likely will be discharged tomorrow. We will likely need to follow-up for his pulmonary hypertension, may need renal biopsy outpatient.      Austin Giraldo DO  [facility] Internal Medicine, PGY 2

## 2023-09-15 LAB
ALBUMIN SERPL-MCNC: 2.3 G/DL (ref 3.4–4.8)
ALBUMIN/GLOB SERPL: 0.7 RATIO (ref 1.1–2)
ALP SERPL-CCNC: 47 UNIT/L (ref 40–150)
ALT SERPL-CCNC: 26 UNIT/L (ref 0–55)
AST SERPL-CCNC: 27 UNIT/L (ref 5–34)
BASOPHILS # BLD AUTO: 0.04 X10(3)/MCL
BASOPHILS NFR BLD AUTO: 0.5 %
BILIRUB SERPL-MCNC: 0.4 MG/DL
BUN SERPL-MCNC: 42.2 MG/DL (ref 8.4–25.7)
CALCIUM SERPL-MCNC: 7.9 MG/DL (ref 8.8–10)
CHLORIDE SERPL-SCNC: 102 MMOL/L (ref 98–107)
CO2 SERPL-SCNC: 19 MMOL/L (ref 23–31)
CREAT SERPL-MCNC: 7.52 MG/DL (ref 0.73–1.18)
DEPRECATED CALCIDIOL+CALCIFEROL SERPL-MC: 18.9 NG/ML (ref 30–80)
EOSINOPHIL # BLD AUTO: 0.28 X10(3)/MCL (ref 0–0.9)
EOSINOPHIL NFR BLD AUTO: 3.5 %
ERYTHROCYTE [DISTWIDTH] IN BLOOD BY AUTOMATED COUNT: 15.3 % (ref 11.5–17)
GFR SERPLBLD CREATININE-BSD FMLA CKD-EPI: 8 MLS/MIN/1.73/M2
GLOBULIN SER-MCNC: 3.3 GM/DL (ref 2.4–3.5)
GLUCOSE SERPL-MCNC: 95 MG/DL (ref 82–115)
HCT VFR BLD AUTO: 33.6 % (ref 42–52)
HCV AB SERPL QL IA: NONREACTIVE
HGB BLD-MCNC: 10.8 G/DL (ref 14–18)
IMM GRANULOCYTES # BLD AUTO: 0.02 X10(3)/MCL (ref 0–0.04)
IMM GRANULOCYTES NFR BLD AUTO: 0.2 %
LYMPHOCYTES # BLD AUTO: 1.77 X10(3)/MCL (ref 0.6–4.6)
LYMPHOCYTES NFR BLD AUTO: 21.9 %
MCH RBC QN AUTO: 24.9 PG (ref 27–31)
MCHC RBC AUTO-ENTMCNC: 32.1 G/DL (ref 33–36)
MCV RBC AUTO: 77.6 FL (ref 80–94)
MONOCYTES # BLD AUTO: 0.9 X10(3)/MCL (ref 0.1–1.3)
MONOCYTES NFR BLD AUTO: 11.1 %
NEUTROPHILS # BLD AUTO: 5.07 X10(3)/MCL (ref 2.1–9.2)
NEUTROPHILS NFR BLD AUTO: 62.8 %
NRBC BLD AUTO-RTO: 0 %
OSMOLALITY UR: 154 MOSM/KG (ref 300–1300)
PLATELET # BLD AUTO: 186 X10(3)/MCL (ref 130–400)
PMV BLD AUTO: 9.6 FL (ref 7.4–10.4)
POTASSIUM SERPL-SCNC: 4.4 MMOL/L (ref 3.5–5.1)
PROT SERPL-MCNC: 5.6 GM/DL (ref 5.8–7.6)
RBC # BLD AUTO: 4.33 X10(6)/MCL (ref 4.7–6.1)
SODIUM SERPL-SCNC: 134 MMOL/L (ref 136–145)
WBC # SPEC AUTO: 8.08 X10(3)/MCL (ref 4.5–11.5)

## 2023-09-15 PROCEDURE — 25000003 PHARM REV CODE 250

## 2023-09-15 PROCEDURE — 21400001 HC TELEMETRY ROOM

## 2023-09-15 PROCEDURE — 83935 ASSAY OF URINE OSMOLALITY: CPT

## 2023-09-15 PROCEDURE — 25000003 PHARM REV CODE 250: Performed by: INTERNAL MEDICINE

## 2023-09-15 PROCEDURE — 82306 VITAMIN D 25 HYDROXY: CPT | Performed by: NURSE PRACTITIONER

## 2023-09-15 PROCEDURE — 25000003 PHARM REV CODE 250: Performed by: STUDENT IN AN ORGANIZED HEALTH CARE EDUCATION/TRAINING PROGRAM

## 2023-09-15 PROCEDURE — 85025 COMPLETE CBC W/AUTO DIFF WBC: CPT | Performed by: STUDENT IN AN ORGANIZED HEALTH CARE EDUCATION/TRAINING PROGRAM

## 2023-09-15 PROCEDURE — 80053 COMPREHEN METABOLIC PANEL: CPT | Performed by: INTERNAL MEDICINE

## 2023-09-15 PROCEDURE — 86803 HEPATITIS C AB TEST: CPT | Performed by: NURSE PRACTITIONER

## 2023-09-15 PROCEDURE — 63600175 PHARM REV CODE 636 W HCPCS: Performed by: STUDENT IN AN ORGANIZED HEALTH CARE EDUCATION/TRAINING PROGRAM

## 2023-09-15 PROCEDURE — 80100016 HC MAINTENANCE HEMODIALYSIS

## 2023-09-15 RX ORDER — AMLODIPINE BESYLATE 5 MG/1
5 TABLET ORAL DAILY
Status: DISCONTINUED | OUTPATIENT
Start: 2023-09-15 | End: 2023-09-18

## 2023-09-15 RX ADMIN — PANTOPRAZOLE SODIUM 40 MG: 40 TABLET, DELAYED RELEASE ORAL at 09:09

## 2023-09-15 RX ADMIN — HYDRALAZINE HYDROCHLORIDE 50 MG: 50 TABLET ORAL at 09:09

## 2023-09-15 RX ADMIN — MUPIROCIN: 20 OINTMENT TOPICAL at 09:09

## 2023-09-15 RX ADMIN — CARVEDILOL 25 MG: 12.5 TABLET, FILM COATED ORAL at 09:09

## 2023-09-15 RX ADMIN — HYDRALAZINE HYDROCHLORIDE 50 MG: 50 TABLET ORAL at 02:09

## 2023-09-15 RX ADMIN — ASPIRIN 81 MG CHEWABLE TABLET 81 MG: 81 TABLET CHEWABLE at 09:09

## 2023-09-15 RX ADMIN — AMLODIPINE BESYLATE 5 MG: 5 TABLET ORAL at 09:09

## 2023-09-15 RX ADMIN — ISOSORBIDE DINITRATE 20 MG: 20 TABLET ORAL at 09:09

## 2023-09-15 RX ADMIN — HEPARIN SODIUM 5000 UNITS: 5000 INJECTION, SOLUTION INTRAVENOUS; SUBCUTANEOUS at 09:09

## 2023-09-15 RX ADMIN — ISOSORBIDE DINITRATE 20 MG: 20 TABLET ORAL at 02:09

## 2023-09-15 RX ADMIN — HYDRALAZINE HYDROCHLORIDE 10 MG: 20 INJECTION INTRAMUSCULAR; INTRAVENOUS at 05:09

## 2023-09-15 NOTE — PROGRESS NOTES
09/15/23 1122        Hemodialysis Catheter 09/12/23 right internal jugular   Placement Date: 09/12/23   Present Prior to Hospital Arrival?: No  Hemodialysis Catheter Type: Temporary catheter  Location: right internal jugular  Placement Verification: X-ray   Line Necessity Review CRRT/HD   Site Assessment No drainage;No redness;No swelling;No warmth   Line Securement Device Secured with sutures   Dressing Type CHG impregnated dressing/sponge;Central line dressing;Transparent (Tegaderm)   Dressing Status Intact   Dressing Intervention Integrity maintained   Date on Dressing 09/13/23   Dressing Due to be Changed 09/20/23   Venous Patency/Care flushed w/o difficulty;blood return present;normal saline locked   Arterial Patency/Care flushed w/o difficulty;blood return present;normal saline locked   Waveform Not being transduced   Post-Hemodialysis Assessment   Blood Volume Processed (Liters) 60 L   Dialyzer Clearance Lightly streaked   Duration of Treatment 180 minutes   Additional Fluid Intake (mL) 500 mL   Total UF (mL) 871 mL   Net Fluid Removal 371   Patient Response to Treatment Tx completed, tolerated well, lines flushed and packed with saline to fillinf volume with new end caps   Post-Treatment Weight 50.7 kg (111 lb 12.4 oz)   Treatment Weight Change 50.7   Post-Hemodialysis Comments no distress noted

## 2023-09-15 NOTE — PLAN OF CARE
Consulted for tunneled HD catheter placement. Temporary HD catheter in place currently. Will plan for tunneled HD catheter placement in cath lab on Tuesday 9/19. Patient will need to be NPO at midnight.    Heena Arnold MD  [employer] General Surgery, PGY5

## 2023-09-15 NOTE — PLAN OF CARE
Problem: Adult Inpatient Plan of Care  Goal: Plan of Care Review  9/15/2023 0538 by Mickey Gilliam RN  Outcome: Ongoing, Progressing  9/15/2023 0538 by Mickey Gillaim RN  Outcome: Ongoing, Progressing  Goal: Patient-Specific Goal (Individualized)  9/15/2023 0538 by Mickey Gilliam, RN  Outcome: Ongoing, Progressing  9/15/2023 0538 by Mickey Gilliam RN  Outcome: Ongoing, Progressing  Goal: Absence of Hospital-Acquired Illness or Injury  9/15/2023 0538 by Mickey Gilliam, JASPREET  Outcome: Ongoing, Progressing  9/15/2023 0538 by Mickey Gilliam, JASPREET  Outcome: Ongoing, Progressing  Goal: Optimal Comfort and Wellbeing  9/15/2023 0538 by Mickey Gilliam RN  Outcome: Ongoing, Progressing  9/15/2023 0538 by Mickey Gilliam RN  Outcome: Ongoing, Progressing  Goal: Readiness for Transition of Care  9/15/2023 0538 by Mickey Gilliam RN  Outcome: Ongoing, Progressing  9/15/2023 0538 by Mickey Gilliam RN  Outcome: Ongoing, Progressing     Problem: Device-Related Complication Risk (Hemodialysis)  Goal: Safe, Effective Therapy Delivery  9/15/2023 0538 by Mickey Gilliam RN  Outcome: Ongoing, Progressing  9/15/2023 0538 by Mickey Gilliam, JASPREET  Outcome: Ongoing, Progressing     Problem: Hemodynamic Instability (Hemodialysis)  Goal: Effective Tissue Perfusion  9/15/2023 0538 by Mickey Gilliam RN  Outcome: Ongoing, Progressing  9/15/2023 0538 by Mickey Gilliam RN  Outcome: Ongoing, Progressing     Problem: Infection (Hemodialysis)  Goal: Absence of Infection Signs and Symptoms  9/15/2023 0538 by Mickey Gilliam RN  Outcome: Ongoing, Progressing  9/15/2023 0538 by Mickey Gilliam RN  Outcome: Ongoing, Progressing     Problem: Infection  Goal: Absence of Infection Signs and Symptoms  9/15/2023 0538 by Mickey Gilliam, JASPREET  Outcome: Ongoing, Progressing  9/15/2023 0538 by Mickey Gilliam, RN  Outcome: Ongoing, Progressing

## 2023-09-15 NOTE — PLAN OF CARE
Problem: Adult Inpatient Plan of Care  Goal: Plan of Care Review  9/15/2023 0955 by Max Clark, LPN  Outcome: Ongoing, Progressing  9/15/2023 0954 by Max Clark, LPN  Outcome: Ongoing, Progressing  Goal: Patient-Specific Goal (Individualized)  9/15/2023 0955 by Max Clark, LPN  Outcome: Ongoing, Progressing  9/15/2023 0954 by Max Clark, LPN  Outcome: Ongoing, Progressing  Goal: Absence of Hospital-Acquired Illness or Injury  9/15/2023 0955 by Max Clark, LPN  Outcome: Ongoing, Progressing  9/15/2023 0954 by Max Clark, LPN  Outcome: Ongoing, Progressing  Goal: Optimal Comfort and Wellbeing  9/15/2023 0955 by Max Clark, LPN  Outcome: Ongoing, Progressing  9/15/2023 0954 by Max Clark, LPN  Outcome: Ongoing, Progressing  Goal: Readiness for Transition of Care  9/15/2023 0955 by Max Clark, LPN  Outcome: Ongoing, Progressing  9/15/2023 0954 by Max Clark, LPN  Outcome: Ongoing, Progressing     Problem: Device-Related Complication Risk (Hemodialysis)  Goal: Safe, Effective Therapy Delivery  9/15/2023 0955 by Max Clark, LPN  Outcome: Ongoing, Progressing  9/15/2023 0954 by Max Clark, LPN  Outcome: Ongoing, Progressing     Problem: Hemodynamic Instability (Hemodialysis)  Goal: Effective Tissue Perfusion  9/15/2023 0955 by Max Clark, LPN  Outcome: Ongoing, Progressing  9/15/2023 0954 by Max Clark, LPN  Outcome: Ongoing, Progressing     Problem: Infection (Hemodialysis)  Goal: Absence of Infection Signs and Symptoms  9/15/2023 0955 by Max Clark, LPN  Outcome: Ongoing, Progressing  9/15/2023 0954 by Max Clark, LPN  Outcome: Ongoing, Progressing     Problem: Infection  Goal: Absence of Infection Signs and Symptoms  9/15/2023 0955 by Max Clark LPN  Outcome: Ongoing, Progressing  9/15/2023 0954 by Eduardo,  STEFANO Santana  Outcome: Ongoing, Progressing

## 2023-09-15 NOTE — PROGRESS NOTES
"[facility]  Nephrology Progress Note  Patient Name: Ryann Ly  Age: 63 y.o.  : 1960  MRN: [id number]  Admission Date: 2023    Chief complaint: Hypertension (C/o high blood pressure at night when he goes to bed and is unable to sleep because he gets sob. Bp at present 230/137. Need referral for pcp)    Hospital course  Ryann Ly is a 63 y.o.   male who presented to the emergency department on 2023 with complaints of shortness of breath and cough.  Patient had no diagnosed medical problems, however, his blood pressure on presentation was 230/130, laboratory results were remarkable for azotemia, hyperkalemia, metabolic acidosis, anemia, and elevated BNP.  Patient was admitted to medicine service, Nephrology was consulted for assistance with management of acute kidney injury, patient was started on hemodialysis on 2023.    Subjective  Patient is resting in bed quietly, denies complaints this morning.  No acute events overnight.      Review of Systems  Cardiovascular:  Negative for chest pain   Respiratory: Negative for shortness of breath    Objective  BP (!) 146/64   Pulse 77   Temp 98.2 °F (36.8 °C) (Oral)   Resp 18   Ht 5' 2" (1.575 m)   Wt 53.3 kg (117 lb 9.6 oz)   SpO2 95%   BMI 21.51 kg/m²   No intake or output data in the 24 hours ending 09/15/23 0825    Physical Exam  General appearance: Patient is in no acute distress.  Skin: No rashes or wounds.  HEENT: PERRLA, EOMI, no scleral icterus, no JVD. Neck is supple.  Right IJ temporary dialysis catheter is in place  Chest: Respirations are unlabored. Lungs sounds are clear.   Heart: S1, S2.   Abdomen: Benign.  : Deferred.  Extremities: No edema, peripheral pulses are palpable.   Neuro: No focal deficits.     Medications    Current Facility-Administered Medications:     aspirin chewable tablet 81 mg, 81 mg, Oral, Daily, Austin Giraldo DO, 81 mg at 23 0812    carvediloL tablet 25 mg, 25 " mg, Oral, BID, Rodrigo Pelaez MD, 25 mg at 09/14/23 2054    dextrose 10% bolus 125 mL 125 mL, 12.5 g, Intravenous, PRN, Stroda, Austin, DO    dextrose 10% bolus 250 mL 250 mL, 25 g, Intravenous, PRN, Stroda, Austin, DO    glucagon (human recombinant) injection 1 mg, 1 mg, Intramuscular, PRN, Stroda, Austin, DO    glucose chewable tablet 16 g, 16 g, Oral, PRN, Stroda, Austin, DO    glucose chewable tablet 24 g, 24 g, Oral, PRN, Stroda, Austin, DO    heparin (porcine) injection 5,000 Units, 5,000 Units, Subcutaneous, Q12H, Rodrigo Pelaez MD, 5,000 Units at 09/14/23 2054    hydrALAZINE injection 10 mg, 10 mg, Intravenous, Q2H PRN, Rodrigo Pelaez MD, 10 mg at 09/15/23 0558    hydrALAZINE tablet 50 mg, 50 mg, Oral, TID, Abner Weaver MD, 50 mg at 09/14/23 2054    isosorbide dinitrate tablet 20 mg, 20 mg, Oral, TID, Abner Weaver MD, 20 mg at 09/14/23 2054    labetalol 20 mg/4 mL (5 mg/mL) IV syring, 10 mg, Intravenous, Q2H PRN, Rodrigo Pelaez MD, 10 mg at 09/13/23 0417    mupirocin 2 % ointment, , Nasal, BID, Raffaele Almodovar MD, Given at 09/14/23 0816    naloxone 0.4 mg/mL injection 0.02 mg, 0.02 mg, Intravenous, PRN, Stroda, Austin, DO    pantoprazole EC tablet 40 mg, 40 mg, Oral, QHS, Stroda, Austin, DO, 40 mg at 09/14/23 2054    sodium chloride 0.9% flush 10 mL, 10 mL, Intravenous, Q12H PRN, Stroda, Austin, DO     Imaging:    Reviewed    Laboratory Data:  Hematology  Lab Results   Component Value Date    WBC 8.08 09/15/2023    HGB 10.8 (L) 09/15/2023    HCT 33.6 (L) 09/15/2023     09/15/2023     (L) 09/15/2023    K 4.4 09/15/2023    CHLORIDE 102 09/15/2023    CO2 19 (L) 09/15/2023    BUN 42.2 (H) 09/15/2023    CREATININE 7.52 (H) 09/15/2023    EGFRNORACEVR 8 09/15/2023    GLUCOSE 95 09/15/2023    CALCIUM 7.9 (L) 09/15/2023    ALKPHOS 47 09/15/2023    LABPROT 5.6 (L) 09/15/2023    ALBUMIN 2.3 (L) 09/15/2023    AST 27 09/15/2023    ALT 26 09/15/2023    MG 2.00 09/14/2023    PHOS 5.4 (H) 09/14/2023     Lab Results    Component Value Date    IRON 63 (L) 09/11/2023    TIBC 197 (L) 09/11/2023    TRANS 167 (L) 09/11/2023    LABIRON 32 09/11/2023    FERRITIN 487.55 (H) 09/11/2023    HAPTO 204 09/11/2023       Lab Results   Component Value Date    HEPBSURFAG Nonreactive 09/12/2023    HEPBSAB Nonreactive 09/12/2023       Impression  Acute kidney injury versus progression to ESRD   Nephrotic range proteinuria  Hypertension   Anemia of chronic disease   NSTEMI  HFrEF (LVEF 45% as of 09/11/2023)    Plan  Evaluation of proteinuria revealed negative JAMES, negative P-ANCA, negative C-ANCA, normal complement levels, MPO, PR3, and GBM antibodies below cutoff, hemoglobin A1c of 5.6, and negative serologies for hepatitis-B.  Will complete evaluation by checking hepatitis-C antibody titer, HIV, and SPEP.  Will screen for CKD-MBD.   There is no evidence of functional renal recovery, patient will likely need to continue hemodialysis after discharge.  Will consult vascular surgery team for tunneled dialysis catheter placement.  Continue hemodialysis per Monday, Wednesday, Friday schedule while hospitalized.    Blood pressure readings are still well above goal, consider up-titrating hydralazine.     Breanna Webster NP  [facility]   9/15/2023     Addendum:  Seen on hemodialysis, tolerated well.  Brittany Hyman M.D.  Nephrology

## 2023-09-15 NOTE — PROGRESS NOTES
[facility] Internal Medicine Wards Progress Note     Resident Team: [facility] Medicine List 1  Attending Physician: Blanca Andres MD  Resident: Rodrigo Pelaez MD  Intern: Abner Weaver MD     Subjective:      Brief HPI:  Ryann Ly is a 63 y.o. male with unknown PMH who presents to [facility] 9/11/2023 for episodes of shortness of breath occurring at night for the past week.  Patient states he has been having some productive cough with clearish-whitish sputum.  States he moved to the \[facility]\"" from [place] about 3 years ago, reports having been seen at an urgent care about a year ago and been started on some unspecified medication for an unspecified problem although has not been taking any medication for some time.  He does report having some reflux symptoms.  Patient denies any chest pain, N/V/D, abdominal pain, leg swelling, dizziness/headache, palpitations, fever, chills, urinary symptoms, hematuria, hemoptysis. Pt states he still makes adequate urine.  Denies any known sick contacts. Despite lab abnormalities patient appears very comfortable, is asking how long he will be staying      In the ED BP was 230/130, with trop elevated at 0.298, BNP 1656. Normal lactate and TSH. Negative flu/covid.    Interval History: No acute events overnight. Patient was dialyzed yesterday with 1.6+ L of net ultrafiltration and was transfused 2 units.  Patient denies any complaints today.  He remains hypertensive.  His hydralazine to 50 t.i.d., and did in started isosorbide dinitrate tablets 20 mg t.i.d. Lexiscan done yesterday was normal, low risk study.    Review of Systems:  Review of Systems   Constitutional:  Negative for chills, diaphoresis, fatigue and fever.   HENT:  Negative for ear pain, hearing loss, rhinorrhea, sore throat, tinnitus and trouble swallowing.    Eyes:  Negative for pain, redness and visual disturbance.   Respiratory:  Negative for cough, chest tightness and shortness of breath.    Cardiovascular:  Negative for chest pain and  palpitations.   Gastrointestinal:  Negative for abdominal pain, constipation, diarrhea, nausea and vomiting.   Genitourinary:  Negative for difficulty urinating, dysuria, frequency, hematuria and urgency.   Musculoskeletal:  Negative for arthralgias and myalgias.   Skin:  Negative for rash and wound.   Neurological:  Negative for dizziness, seizures, syncope, weakness, light-headedness, numbness and headaches.   Psychiatric/Behavioral:  Negative for confusion.         Objective:     Last 24 Hour Vital Signs:  BP  Min: 130/76  Max: 178/89  Temp  Av.3 °F (36.8 °C)  Min: 97.9 °F (36.6 °C)  Max: 99 °F (37.2 °C)  Pulse  Av.6  Min: 68  Max: 77  Resp  Av.5  Min: 18  Max: 20  SpO2  Av.8 %  Min: 95 %  Max: 98 %  I/O last 3 completed shifts:  In: 597.7 [P.O.:580; I.V.:17.7]  Out: -     Physical Examination:  Physical Exam  Vitals reviewed.   Constitutional:       General: He is not in acute distress.     Appearance: Normal appearance. He is normal weight. He is not ill-appearing.   HENT:      Head: Normocephalic and atraumatic.      Comments: Hemodialysis catheter to right neck     Right Ear: External ear normal.      Left Ear: External ear normal.   Eyes:      General: No scleral icterus.        Right eye: No discharge.         Left eye: No discharge.      Extraocular Movements: Extraocular movements intact.      Conjunctiva/sclera: Conjunctivae normal.      Pupils: Pupils are equal, round, and reactive to light.   Cardiovascular:      Rate and Rhythm: Normal rate and regular rhythm.      Pulses: Normal pulses.      Heart sounds: Normal heart sounds. No murmur heard.     No friction rub. No gallop.   Pulmonary:      Effort: Pulmonary effort is normal. No respiratory distress.      Breath sounds: Normal breath sounds. No stridor. No wheezing, rhonchi or rales.   Abdominal:      General: Abdomen is flat. Bowel sounds are normal. There is no distension.      Palpations: Abdomen is soft.      Tenderness:  There is no abdominal tenderness. There is no guarding.   Musculoskeletal:         General: No swelling, tenderness, deformity or signs of injury. Normal range of motion.      Right lower leg: No edema.      Left lower leg: No edema.   Skin:     General: Skin is warm and dry.      Capillary Refill: Capillary refill takes less than 2 seconds.      Coloration: Skin is not jaundiced.      Findings: No bruising, erythema or rash.   Neurological:      Mental Status: He is alert.      Comments: AAO to person, place, time, and situation; CN II-XII grossly intact         Laboratory:  Most Recent Data:  CBC:   Lab Results   Component Value Date    WBC 8.08 09/15/2023    HGB 10.8 (L) 09/15/2023    HCT 33.6 (L) 09/15/2023     09/15/2023    MCV 77.6 (L) 09/15/2023    RDW 15.3 09/15/2023     WBC Differential:   Recent Labs   Lab 09/11/23  1556 09/12/23  0602 09/13/23  0808 09/14/23  0310 09/15/23  0312   WBC 6.65 7.77 5.69 8.03 8.08   HGB 9.1* 8.3* 7.5* 10.5* 10.8*   HCT 28.6* 25.9* 23.7* 32.9* 33.6*    203 184 174 186   MCV 76.7* 74.4* 75.5* 77.8* 77.6*     BMP:   Lab Results   Component Value Date     (L) 09/15/2023    K 4.4 09/15/2023    CO2 19 (L) 09/15/2023    BUN 42.2 (H) 09/15/2023    CREATININE 7.52 (H) 09/15/2023    CALCIUM 7.9 (L) 09/15/2023    MG 2.00 09/14/2023    PHOS 5.4 (H) 09/14/2023     LFTs:   Lab Results   Component Value Date    ALBUMIN 2.3 (L) 09/15/2023    BILITOT 0.4 09/15/2023    AST 27 09/15/2023    ALKPHOS 47 09/15/2023    ALT 26 09/15/2023     Coags:   Lab Results   Component Value Date    INR 1.1 09/11/2023    PROTIME 13.5 09/11/2023    PTT 75.6 (H) 09/12/2023     FLP:   Lab Results   Component Value Date    CHOL 166 09/12/2023    HDL 43 09/12/2023    TRIG 47 09/12/2023     DM:   Lab Results   Component Value Date    HGBA1C 5.6 09/12/2023    CREATININE 7.52 (H) 09/15/2023     Thyroid:   Lab Results   Component Value Date    TSH 1.822 09/11/2023     Anemia:   Lab Results   Component  Value Date    IRON 63 (L) 09/11/2023    TIBC 197 (L) 09/11/2023    FERRITIN 487.55 (H) 09/11/2023     Cardiac:   Lab Results   Component Value Date    TROPONINI 0.228 (H) 09/12/2023    BNP 1,656.2 (H) 09/11/2023     Urinalysis:   Lab Results   Component Value Date    PHUA 6.0 09/11/2023    UROBILINOGEN Normal 09/11/2023    WBCUA 0-5 09/11/2023       Radiology:  Imaging Results              US Doppler Renal Artery and Vein (xpd) (Final result)  Result time 09/12/23 12:27:06      Final result by Rodrigo Alegria MD (09/12/23 12:27:06)                   Impression:      1. Medical renal disease.  2. No hydronephrosis.  1.    Electronically signed by: Rodrigo Alegria  Date:    09/12/2023  Time:    12:27               Narrative:    EXAMINATION:  US DOPPLER RENAL ARTERY AND VEIN (XPD)    CLINICAL HISTORY:  hypertensive emergency;    COMPARISON:  11 September 2023    FINDINGS:  Grayscale, color and spectral Doppler sonographic evaluation of the kidneys and renal vasculature.    The right kidney measures 8.5 cm. The left kidney measures 8 cm.   No hydronephrosis.  There is increased renal parenchymal echogenicity.  There is a right renal cyst measuring 3-4 cm.  No follow-up is needed.    There are elevated resistive indices in segmental renal arteries.  No significantly elevated velocities in the main renal arteries.  Renal veins are patent.                                       US Retroperitoneal Complete (Final result)  Result time 09/11/23 15:39:56      Final result by Malcom Castro MD (09/11/23 15:39:56)                   Narrative:    EXAMINATION  US RETROPERITONEAL COMPLETE    CLINICAL HISTORY  rule out hydronephrosis;    TECHNIQUE  Multiplanar grayscale sonographic images were obtained of the retroperitoneum and pelvis.    COMPARISON  None available at the time of initial interpretation.    FINDINGS  Exam quality: adequate for evaluation    Kidneys: The right kidney measures 8.2 cm x 3.4 cm x 4.4 cm, with the left  kidney 8.1 cm x 3.9 cm x 3.9 cm.  The bilateral renal parenchyma is echogenic relative to the adjacent liver and spleen.  No solid mass-like lesion or complex cyst is identified.  A simple right lower pole cyst measures up to 3.1 cm in diameter, with additional simple cyst at the left upper renal cortex measuring 1.1 cm.  No collecting system dilatation.  No shadowing calcification is identified.  No perinephric collection or free abdominal fluid.    Bladder: No convincing intraluminal abnormality.  No wall thickening or focal mural lesion.    IMPRESSION  1. Increased echogenicity of the kidneys is nonspecific but suggest element of chronic medical renal disease.  2. Simple bilateral cortical cysts.  3. No evidence of acute urologic abnormality.      Electronically signed by: Malcom Castro  Date:    09/11/2023  Time:    15:39                                     CT Chest Without Contrast (Final result)  Result time 09/11/23 15:38:00      Final result by Malcom Castro MD (09/11/23 15:38:00)                   Narrative:    EXAMINATION  CT CHEST WITHOUT CONTRAST    CLINICAL HISTORY  possible new onset chf vs pneumonia;    TECHNIQUE  Non-contrast helical-acquisition CT images were obtained and multiplanar reformats accomplished by a CT technologist at a separate workstation, pushed to PACS for physician review.    COMPARISON  Chest radiograph obtained earlier the same day.    FINDINGS  Images were reviewed in lung, soft tissue, and bone windows.    Exam quality: Limited secondary to patient movement throughout image acquisition, with resulting artifact.  Additionally, non-contrast protocol further limits overall diagnostic value.    Lines/tubes: none visualized    Heart chambers are prominent in size.  There is scattered vascular calcification, with no aneurysmal dilatation or other focal abnormality identified.  Hypoattenuation of the blood pool is nonspecific but suggest element of anemia.  There is no significant  pericardial fluid.    Central airway patency is widely maintained.  Patchy bilateral multilobar areas of ground-glass airspace attenuation noted, with central predominance.  There are also small volume layering bilateral pleural effusions.  No loculated fluid component is identified.  There is no pneumothorax.    No convincing acute abnormality of the included upper abdominal solid organs.  Simple posterior right renal cortex cyst is incidentally identified.  The thyroid tissue is unremarkable.  No acute or destructive skeletal abnormality is identified.    IMPRESSION  1. Findings suggestive of central vascular congestion and developing pulmonary edema.  2. Small volume bilateral pleural effusions.  3. Nonspecific hypodensity of the blood pool, can be seen with anemia.    RADIATION DOSE  Automated tube current modulation, weight-based exposure dosing, and/or iterative reconstruction technique utilized to reach lowest reasonably achievable exposure rate.    DLP: 203 mGy*cm      Electronically signed by: Malcom Castro  Date:    09/11/2023  Time:    15:38                                     X-Ray Chest 1 View (Final result)  Result time 09/11/23 13:33:20      Final result by Malcom Castro MD (09/11/23 13:33:20)                   Narrative:    EXAMINATION  XR CHEST 1 VIEW    CLINICAL HISTORY  sob;    TECHNIQUE  A total of 1 frontal image(s) of the chest.    COMPARISON  None available at the time of initial interpretation.    FINDINGS  Lines/tubes/devices: none present    The cardiomediastinal silhouette and central pulmonary vasculature are unremarkable for utilized technique.  The trachea is midline.  Ill-defined right perihilar and basilar infiltrate noted, with no organized lobar consolidation identified.  Left lung field is clear.  There is no large pleural effusion or convincing pneumothorax.    There is no acute osseous or extrathoracic abnormality.    IMPRESSION  Ill-defined right perihilar/basilar infiltrate  may represent developing atypical infectious process.      Electronically signed by: Malcom Castro  Date:    09/11/2023  Time:    13:33                                  Current Medications:     Infusions:       Scheduled:   amLODIPine  5 mg Oral Daily    aspirin  81 mg Oral Daily    carvediloL  25 mg Oral BID    heparin (porcine)  5,000 Units Subcutaneous Q12H    hydrALAZINE  50 mg Oral TID    isosorbide dinitrate  20 mg Oral TID    mupirocin   Nasal BID    pantoprazole  40 mg Oral QHS        PRN:  dextrose 10%, dextrose 10%, glucagon (human recombinant), glucose, glucose, hydrALAZINE, labetalol, naloxone, sodium chloride 0.9%  Assessment & Plan:     Acute renal failure  Anion gap metabolic acidosis of uremic etiology, resolved  -BUN//12 upon initial presentation  -nephrology consulted; appreciate assistance; dialysis per nephro  -patient will need continued dialysis post discharge  -general surgery consulted; planning for tunnel cath placement (09/19/2023)  -ultrasounds negative for renal artery stenosis  -further workup pending     Hypertension  -bp difficult to control as he intermittently is normotensive and hypertensive  -continue carvedilol to 25 mg BID, hydralazine 50 t.i.d., isosorbide dinitrate 20 mg t.i.d.  -initiated amlodipine 5 mg today      HFrEF  NSTEMI, suspect demand ischemia  -LVEF 45% 09/11/2023  - Troponin peaked at 0.304, which may be confounded by patient's acute renal failure  - Lexiscan was done 09/13/2023, normal scan low risk study.  Unable to perform LHC in the setting of acute renal failure  -continue carvedilol to 25 mg BID, hydralazine 50 t.i.d., isosorbide dinitrate 20 mg t.i.d. Unable to start ACE/ARB/ARNI, MRA, or SGLT2 given his renal failure     Pulmonary Hypertension  - estimated pulmonary artery systolic pressure was 79 on TTE  - CT PE was negative for pulmonary embolus, only remarkable for pulmonary edema  - will need further workup with outpatient sleep study, and if  negative, would benefit from right heart catheterization as outpatient    Disposition:  Continue inpatient management for tunnel catheter placement for continued dialysis post discharge.    The patient was seen with and plan was discussed with Dr. Andres, who agrees.    Abner Weaver MD  [facility] Internal Medicine, PGY-1

## 2023-09-16 LAB
ANION GAP SERPL CALC-SCNC: 10 MEQ/L
BASOPHILS # BLD AUTO: 0.04 X10(3)/MCL
BASOPHILS NFR BLD AUTO: 0.6 %
BUN SERPL-MCNC: 28.3 MG/DL (ref 8.4–25.7)
CALCIUM SERPL-MCNC: 7.8 MG/DL (ref 8.8–10)
CHLORIDE SERPL-SCNC: 101 MMOL/L (ref 98–107)
CO2 SERPL-SCNC: 25 MMOL/L (ref 23–31)
CREAT SERPL-MCNC: 5.99 MG/DL (ref 0.73–1.18)
CREAT/UREA NIT SERPL: 5
EOSINOPHIL # BLD AUTO: 0.27 X10(3)/MCL (ref 0–0.9)
EOSINOPHIL NFR BLD AUTO: 4.4 %
ERYTHROCYTE [DISTWIDTH] IN BLOOD BY AUTOMATED COUNT: 15.3 % (ref 11.5–17)
GFR SERPLBLD CREATININE-BSD FMLA CKD-EPI: 10 MLS/MIN/1.73/M2
GLUCOSE SERPL-MCNC: 94 MG/DL (ref 82–115)
HCT VFR BLD AUTO: 35.3 % (ref 42–52)
HGB BLD-MCNC: 11.2 G/DL (ref 14–18)
HIV 1+2 AB+HIV1 P24 AG SERPL QL IA: NONREACTIVE
IGA SERPL-MCNC: 267 MG/DL (ref 101–645)
IGG SERPL-MCNC: 989 MG/DL (ref 540–1822)
IGM SERPL-MCNC: 57 MG/DL (ref 22–240)
IMM GRANULOCYTES # BLD AUTO: 0.02 X10(3)/MCL (ref 0–0.04)
IMM GRANULOCYTES NFR BLD AUTO: 0.3 %
LYMPHOCYTES # BLD AUTO: 1.38 X10(3)/MCL (ref 0.6–4.6)
LYMPHOCYTES NFR BLD AUTO: 22.3 %
MCH RBC QN AUTO: 24.6 PG (ref 27–31)
MCHC RBC AUTO-ENTMCNC: 31.7 G/DL (ref 33–36)
MCV RBC AUTO: 77.4 FL (ref 80–94)
MONOCYTES # BLD AUTO: 0.88 X10(3)/MCL (ref 0.1–1.3)
MONOCYTES NFR BLD AUTO: 14.2 %
NEUTROPHILS # BLD AUTO: 3.61 X10(3)/MCL (ref 2.1–9.2)
NEUTROPHILS NFR BLD AUTO: 58.2 %
NRBC BLD AUTO-RTO: 0 %
PHOSPHATE SERPL-MCNC: 5.8 MG/DL (ref 2.3–4.7)
PLATELET # BLD AUTO: 212 X10(3)/MCL (ref 130–400)
PMV BLD AUTO: 9.9 FL (ref 7.4–10.4)
POTASSIUM SERPL-SCNC: 4.2 MMOL/L (ref 3.5–5.1)
PTH-INTACT SERPL-MCNC: 471.1 PG/ML (ref 8.7–77)
RBC # BLD AUTO: 4.56 X10(6)/MCL (ref 4.7–6.1)
SODIUM SERPL-SCNC: 136 MMOL/L (ref 136–145)
WBC # SPEC AUTO: 6.2 X10(3)/MCL (ref 4.5–11.5)

## 2023-09-16 PROCEDURE — 21400001 HC TELEMETRY ROOM

## 2023-09-16 PROCEDURE — 25000003 PHARM REV CODE 250: Performed by: NURSE PRACTITIONER

## 2023-09-16 PROCEDURE — 84100 ASSAY OF PHOSPHORUS: CPT | Performed by: NURSE PRACTITIONER

## 2023-09-16 PROCEDURE — 63600175 PHARM REV CODE 636 W HCPCS: Performed by: STUDENT IN AN ORGANIZED HEALTH CARE EDUCATION/TRAINING PROGRAM

## 2023-09-16 PROCEDURE — 25000003 PHARM REV CODE 250

## 2023-09-16 PROCEDURE — 87389 HIV-1 AG W/HIV-1&-2 AB AG IA: CPT | Performed by: NURSE PRACTITIONER

## 2023-09-16 PROCEDURE — 80048 BASIC METABOLIC PNL TOTAL CA: CPT | Performed by: NURSE PRACTITIONER

## 2023-09-16 PROCEDURE — 86334 IMMUNOFIX E-PHORESIS SERUM: CPT | Performed by: NURSE PRACTITIONER

## 2023-09-16 PROCEDURE — 25000003 PHARM REV CODE 250: Performed by: STUDENT IN AN ORGANIZED HEALTH CARE EDUCATION/TRAINING PROGRAM

## 2023-09-16 PROCEDURE — 84165 PROTEIN E-PHORESIS SERUM: CPT | Performed by: NURSE PRACTITIONER

## 2023-09-16 PROCEDURE — 83521 IG LIGHT CHAINS FREE EACH: CPT | Performed by: NURSE PRACTITIONER

## 2023-09-16 PROCEDURE — 83970 ASSAY OF PARATHORMONE: CPT | Performed by: NURSE PRACTITIONER

## 2023-09-16 PROCEDURE — 85025 COMPLETE CBC W/AUTO DIFF WBC: CPT | Performed by: STUDENT IN AN ORGANIZED HEALTH CARE EDUCATION/TRAINING PROGRAM

## 2023-09-16 PROCEDURE — 82784 ASSAY IGA/IGD/IGG/IGM EACH: CPT | Performed by: NURSE PRACTITIONER

## 2023-09-16 RX ORDER — SEVELAMER CARBONATE 800 MG/1
800 TABLET, FILM COATED ORAL
Status: DISCONTINUED | OUTPATIENT
Start: 2023-09-16 | End: 2023-09-17

## 2023-09-16 RX ORDER — CALCITRIOL 0.25 UG/1
0.25 CAPSULE ORAL EVERY OTHER DAY
Status: DISCONTINUED | OUTPATIENT
Start: 2023-09-16 | End: 2023-09-20 | Stop reason: HOSPADM

## 2023-09-16 RX ADMIN — CARVEDILOL 25 MG: 12.5 TABLET, FILM COATED ORAL at 09:09

## 2023-09-16 RX ADMIN — HEPARIN SODIUM 5000 UNITS: 5000 INJECTION, SOLUTION INTRAVENOUS; SUBCUTANEOUS at 08:09

## 2023-09-16 RX ADMIN — ISOSORBIDE DINITRATE 20 MG: 20 TABLET ORAL at 09:09

## 2023-09-16 RX ADMIN — HEPARIN SODIUM 5000 UNITS: 5000 INJECTION, SOLUTION INTRAVENOUS; SUBCUTANEOUS at 09:09

## 2023-09-16 RX ADMIN — AMLODIPINE BESYLATE 5 MG: 5 TABLET ORAL at 08:09

## 2023-09-16 RX ADMIN — PANTOPRAZOLE SODIUM 40 MG: 40 TABLET, DELAYED RELEASE ORAL at 09:09

## 2023-09-16 RX ADMIN — HYDRALAZINE HYDROCHLORIDE 75 MG: 50 TABLET ORAL at 09:09

## 2023-09-16 RX ADMIN — SEVELAMER CARBONATE 800 MG: 800 TABLET, FILM COATED ORAL at 11:09

## 2023-09-16 RX ADMIN — ASPIRIN 81 MG CHEWABLE TABLET 81 MG: 81 TABLET CHEWABLE at 08:09

## 2023-09-16 RX ADMIN — ISOSORBIDE DINITRATE 20 MG: 20 TABLET ORAL at 03:09

## 2023-09-16 RX ADMIN — ISOSORBIDE DINITRATE 20 MG: 20 TABLET ORAL at 08:09

## 2023-09-16 RX ADMIN — SEVELAMER CARBONATE 800 MG: 800 TABLET, FILM COATED ORAL at 04:09

## 2023-09-16 RX ADMIN — CALCITRIOL CAPSULES 0.25 MCG 0.25 MCG: 0.25 CAPSULE ORAL at 08:09

## 2023-09-16 RX ADMIN — HYDRALAZINE HYDROCHLORIDE 75 MG: 50 TABLET ORAL at 03:09

## 2023-09-16 RX ADMIN — HYDRALAZINE HYDROCHLORIDE 75 MG: 50 TABLET ORAL at 08:09

## 2023-09-16 RX ADMIN — CARVEDILOL 25 MG: 12.5 TABLET, FILM COATED ORAL at 08:09

## 2023-09-16 RX ADMIN — MUPIROCIN: 20 OINTMENT TOPICAL at 08:09

## 2023-09-16 NOTE — PLAN OF CARE
Problem: Adult Inpatient Plan of Care  Goal: Absence of Hospital-Acquired Illness or Injury  Outcome: Ongoing, Progressing  Goal: Optimal Comfort and Wellbeing  Outcome: Ongoing, Progressing  Goal: Readiness for Transition of Care  Outcome: Ongoing, Progressing     Problem: Device-Related Complication Risk (Hemodialysis)  Goal: Safe, Effective Therapy Delivery  Outcome: Ongoing, Progressing     Problem: Hemodynamic Instability (Hemodialysis)  Goal: Effective Tissue Perfusion  Outcome: Ongoing, Progressing     Problem: Infection (Hemodialysis)  Goal: Absence of Infection Signs and Symptoms  Outcome: Ongoing, Progressing     Problem: Infection  Goal: Absence of Infection Signs and Symptoms  Outcome: Ongoing, Progressing     Problem: Skin Injury Risk Increased  Goal: Skin Health and Integrity  Outcome: Ongoing, Progressing     Problem: Adjustment to Illness (Heart Failure)  Goal: Optimal Coping  Outcome: Ongoing, Progressing     Problem: Cardiac Output Decreased (Heart Failure)  Goal: Optimal Cardiac Output  Outcome: Ongoing, Progressing     Problem: Fluid Imbalance (Heart Failure)  Goal: Fluid Balance  Outcome: Ongoing, Progressing     Problem: Oral Intake Inadequate (Heart Failure)  Goal: Optimal Nutrition Intake  Outcome: Ongoing, Progressing

## 2023-09-16 NOTE — PROGRESS NOTES
"[facility]  Nephrology Progress Note  Patient Name: Ryann Ly  Age: 63 y.o.  : 1960  MRN: [id number]  Admission Date: 2023    Chief complaint: Hypertension (C/o high blood pressure at night when he goes to bed and is unable to sleep because he gets sob. Bp at present 230/137. Need referral for pcp)    Hospital course  Ryann Ly is a 63 y.o.   male who presented to the emergency department on 2023 with complaints of shortness of breath and cough.  Patient had no diagnosed medical problems, however, his blood pressure on presentation was 230/130, laboratory results were remarkable for azotemia, hyperkalemia, metabolic acidosis, anemia, and elevated BNP.  Patient was admitted to medicine service, Nephrology was consulted for assistance with management of acute kidney injury, patient was started on hemodialysis on 2023.    Subjective  Patient tolerated hemodialysis treatment without adverse events yesterday.  No acute events overnight.      Review of Systems  Cardiovascular:  Negative for chest pain   Respiratory: Negative for shortness of breath    Objective  BP (!) 153/81   Pulse 70   Temp 98.4 °F (36.9 °C) (Oral)   Resp 20   Ht 5' 2" (1.575 m)   Wt 53.3 kg (117 lb 9.6 oz)   SpO2 97%   BMI 21.51 kg/m²     Intake/Output Summary (Last 24 hours) at 2023 0711  Last data filed at 9/15/2023 1200  Gross per 24 hour   Intake 1040 ml   Output 871 ml   Net 169 ml       Physical Exam  General appearance: Patient is in no acute distress.  Skin: No rashes or wounds.  HEENT: PERRLA, EOMI, no scleral icterus, no JVD. Neck is supple.  Right IJ temporary dialysis catheter is in place  Chest: Respirations are unlabored. Lungs sounds are clear.   Heart: S1, S2.   Abdomen: Benign.  : Deferred.  Extremities: No edema, peripheral pulses are palpable.   Neuro: No focal deficits.     Medications    Current Facility-Administered Medications:     amLODIPine tablet " 5 mg, 5 mg, Oral, Daily, Abner Weaver MD, 5 mg at 09/15/23 0939    aspirin chewable tablet 81 mg, 81 mg, Oral, Daily, Silver Giraldole, DO, 81 mg at 09/15/23 0935    carvediloL tablet 25 mg, 25 mg, Oral, BID, Rodrigo Pelaez MD, 25 mg at 09/15/23 2114    dextrose 10% bolus 125 mL 125 mL, 12.5 g, Intravenous, PRN, Stroda, Austin, DO    dextrose 10% bolus 250 mL 250 mL, 25 g, Intravenous, PRN, Stroda, Austin, DO    glucagon (human recombinant) injection 1 mg, 1 mg, Intramuscular, PRN, Oscarda, Austin, DO    glucose chewable tablet 16 g, 16 g, Oral, PRN, Stroda, Austin, DO    glucose chewable tablet 24 g, 24 g, Oral, PRN, Stroda, Austin, DO    heparin (porcine) injection 5,000 Units, 5,000 Units, Subcutaneous, Q12H, Rodrigo Pelaez MD, 5,000 Units at 09/15/23 2114    hydrALAZINE injection 10 mg, 10 mg, Intravenous, Q2H PRN, Rodrigo Pelaez MD, 10 mg at 09/15/23 0558    hydrALAZINE tablet 50 mg, 50 mg, Oral, TID, Abner Weaver MD, 50 mg at 09/15/23 2114    isosorbide dinitrate tablet 20 mg, 20 mg, Oral, TID, Abner Weaver MD, 20 mg at 09/15/23 2114    labetalol 20 mg/4 mL (5 mg/mL) IV syring, 10 mg, Intravenous, Q2H PRN, Rodrigo Pelaez MD, 10 mg at 09/13/23 0417    mupirocin 2 % ointment, , Nasal, BID, Raffaele Almodovar MD, Given at 09/15/23 0937    naloxone 0.4 mg/mL injection 0.02 mg, 0.02 mg, Intravenous, PRN, Enzo, Austin, DO    pantoprazole EC tablet 40 mg, 40 mg, Oral, QHS, Stroda, Austin, DO, 40 mg at 09/15/23 2114    sodium chloride 0.9% flush 10 mL, 10 mL, Intravenous, Q12H PRN, Austin Giraldo DO     Imaging:    Reviewed    Laboratory Data:  Hematology  Lab Results   Component Value Date    WBC 6.20 09/16/2023    HGB 11.2 (L) 09/16/2023    HCT 35.3 (L) 09/16/2023     09/16/2023     09/16/2023    K 4.2 09/16/2023    CHLORIDE 101 09/16/2023    CO2 25 09/16/2023    BUN 28.3 (H) 09/16/2023    CREATININE 5.99 (H) 09/16/2023    EGFRNORACEVR 10 09/16/2023    GLUCOSE 94 09/16/2023    CALCIUM 7.8 (L) 09/16/2023    ALKPHOS 47  09/15/2023    LABPROT 5.6 (L) 09/15/2023    ALBUMIN 2.3 (L) 09/15/2023    AST 27 09/15/2023    ALT 26 09/15/2023    MG 2.00 09/14/2023    PHOS 5.8 (H) 09/16/2023     Lab Results   Component Value Date    IRON 63 (L) 09/11/2023    TIBC 197 (L) 09/11/2023    TRANS 167 (L) 09/11/2023    LABIRON 32 09/11/2023    FERRITIN 487.55 (H) 09/11/2023    HAPTO 204 09/11/2023       Lab Results   Component Value Date    HIV Nonreactive 09/16/2023    HEPCAB Nonreactive 09/15/2023    HEPBSURFAG Nonreactive 09/12/2023    HEPBSAB Nonreactive 09/12/2023       Impression  Acute kidney injury versus progression to ESRD   Nephrotic range proteinuria  Secondary hyperparathyroidism   Hyperphosphatemia  Hypertension   Anemia of chronic disease   NSTEMI  HFrEF (LVEF 45% as of 09/11/2023)    Plan  Evaluation of proteinuria revealed negative JAMES, negative P-ANCA, negative C-ANCA, normal complement levels, MPO, PR3, and GBM antibodies below cutoff, hemoglobin A1c of 5.6, and negative serologies for hepatitis-B and C, HIV nonreactive.  SPEP results are pending.  Hypertension is better controlled, continue current medication regimen.  Will start calcitriol 0.25 mcg every other day for treatment of secondary hyperparathyroidism, start phosphorus binder with meals for management of hyperphosphatemia.  Since there is no evidence of functional renal recovery, patient will need to continue hemodialysis indefinitely.  Will likely continue HD per Monday, Wednesday, Friday schedule while hospitalized.  Patient will need a tunneled catheter placed prior to being discharged, vascular surgery team is aware, procedure is tentatively scheduled for early next week.    Breanna Webster NP  [facility] Neph[facility]   9/16/2023

## 2023-09-16 NOTE — PROGRESS NOTES
\[facility] [place] [facility]\"" Internal Medicine Wards Progress Note     Resident Team: [facility] Medicine List 1  Attending Physician: Blanca Andres MD  Resident: Rodrigo Pelaez MD  Intern: Abner Weaver MD     Subjective:      Brief HPI:  Ryann Ly is a 63 y.o. male with unknown PMH who presents to [facility] 9/11/2023 for episodes of shortness of breath occurring at night for the past week.  Patient states he has been having some productive cough with clearish-whitish sputum.  States he moved to the [facility] from [place] about 3 years ago, reports having been seen at an urgent care about a year ago and been started on some unspecified medication for an unspecified problem although has not been taking any medication for some time.  He does report having some reflux symptoms.  Patient denies any chest pain, N/V/D, abdominal pain, leg swelling, dizziness/headache, palpitations, fever, chills, urinary symptoms, hematuria, hemoptysis. Pt states he still makes adequate urine.  Denies any known sick contacts. Despite lab abnormalities patient appears very comfortable, is asking how long he will be staying      In the ED BP was 230/130, with trop elevated at 0.298, BNP 1656. Normal lactate and TSH. Negative flu/covid.    Interval History: No acute events overnight. Endorses vague left side/left flank pain. Patient still producing minimal urine. Denied dysuria or hematuria with urination. Denied any other acute complaints. Continues to by hypertensive. Increased hydralazine to 75 mg tid today. Planning for tunnel catheter (Tuesday 09/19/2023)    Review of Systems:  Review of Systems   Constitutional:  Negative for chills, diaphoresis, fatigue and fever.   HENT:  Negative for ear pain, hearing loss, rhinorrhea, sore throat, tinnitus and trouble swallowing.    Eyes:  Negative for pain, redness and visual disturbance.   Respiratory:  Negative for cough, chest tightness and shortness of breath.    Cardiovascular:  Negative for chest pain and palpitations.    Gastrointestinal:  Negative for abdominal pain, constipation, diarrhea, nausea and vomiting.   Genitourinary:  Negative for difficulty urinating, dysuria, frequency, hematuria and urgency.   Musculoskeletal:  Negative for arthralgias and myalgias.   Skin:  Negative for rash and wound.   Neurological:  Negative for dizziness, seizures, syncope, weakness, light-headedness, numbness and headaches.   Psychiatric/Behavioral:  Negative for confusion.         Objective:     Last 24 Hour Vital Signs:  BP  Min: 105/53  Max: 191/99  Temp  Av.3 °F (36.8 °C)  Min: 98 °F (36.7 °C)  Max: 98.5 °F (36.9 °C)  Pulse  Av.6  Min: 69  Max: 72  Resp  Av  Min: 20  Max: 20  SpO2  Av.3 %  Min: 96 %  Max: 99 %  I/O last 3 completed shifts:  In: 1040 [P.O.:540; Other:500]  Out: 871 [Other:871]    Physical Examination:  Physical Exam  Vitals reviewed.   Constitutional:       General: He is not in acute distress.     Appearance: Normal appearance. He is normal weight. He is not ill-appearing.   HENT:      Head: Normocephalic and atraumatic.      Comments: RIJ catheter in place     Right Ear: External ear normal.      Left Ear: External ear normal.   Eyes:      General: No scleral icterus.        Right eye: No discharge.         Left eye: No discharge.      Extraocular Movements: Extraocular movements intact.      Conjunctiva/sclera: Conjunctivae normal.      Pupils: Pupils are equal, round, and reactive to light.   Cardiovascular:      Rate and Rhythm: Normal rate and regular rhythm.      Pulses: Normal pulses.      Heart sounds: Normal heart sounds. No murmur heard.     No friction rub. No gallop.   Pulmonary:      Effort: Pulmonary effort is normal. No respiratory distress.      Breath sounds: Normal breath sounds. No stridor. No wheezing, rhonchi or rales.   Abdominal:      General: Abdomen is flat. Bowel sounds are normal. There is no distension.      Palpations: Abdomen is soft.      Tenderness: There is no abdominal  tenderness. There is no guarding.   Musculoskeletal:         General: No swelling, tenderness, deformity or signs of injury. Normal range of motion.      Right lower leg: No edema.      Left lower leg: No edema.      Comments: Negative CVA tenderness bilaterally   Skin:     General: Skin is warm and dry.      Capillary Refill: Capillary refill takes less than 2 seconds.      Coloration: Skin is not jaundiced.      Findings: No bruising, erythema or rash.   Neurological:      Mental Status: He is alert.      Comments: AAO to person, place, time, and situation; CN II-XII grossly intact         Laboratory:  Most Recent Data:  CBC:   Lab Results   Component Value Date    WBC 6.20 09/16/2023    HGB 11.2 (L) 09/16/2023    HCT 35.3 (L) 09/16/2023     09/16/2023    MCV 77.4 (L) 09/16/2023    RDW 15.3 09/16/2023     WBC Differential:   Recent Labs   Lab 09/12/23  0602 09/13/23  0808 09/14/23  0310 09/15/23  0312 09/16/23  0254   WBC 7.77 5.69 8.03 8.08 6.20   HGB 8.3* 7.5* 10.5* 10.8* 11.2*   HCT 25.9* 23.7* 32.9* 33.6* 35.3*    184 174 186 212   MCV 74.4* 75.5* 77.8* 77.6* 77.4*       BMP:   Lab Results   Component Value Date     09/16/2023    K 4.2 09/16/2023    CO2 25 09/16/2023    BUN 28.3 (H) 09/16/2023    CREATININE 5.99 (H) 09/16/2023    CALCIUM 7.8 (L) 09/16/2023    MG 2.00 09/14/2023    PHOS 5.8 (H) 09/16/2023     LFTs:   Lab Results   Component Value Date    ALBUMIN 2.3 (L) 09/15/2023    BILITOT 0.4 09/15/2023    AST 27 09/15/2023    ALKPHOS 47 09/15/2023    ALT 26 09/15/2023     Coags:   Lab Results   Component Value Date    INR 1.1 09/11/2023    PROTIME 13.5 09/11/2023    PTT 75.6 (H) 09/12/2023     FLP:   Lab Results   Component Value Date    CHOL 166 09/12/2023    HDL 43 09/12/2023    TRIG 47 09/12/2023     DM:   Lab Results   Component Value Date    HGBA1C 5.6 09/12/2023    CREATININE 5.99 (H) 09/16/2023     Thyroid:   Lab Results   Component Value Date    TSH 1.822 09/11/2023     Anemia:    Lab Results   Component Value Date    IRON 63 (L) 09/11/2023    TIBC 197 (L) 09/11/2023    FERRITIN 487.55 (H) 09/11/2023     Cardiac:   Lab Results   Component Value Date    TROPONINI 0.228 (H) 09/12/2023    BNP 1,656.2 (H) 09/11/2023     Urinalysis:   Lab Results   Component Value Date    PHUA 6.0 09/11/2023    UROBILINOGEN Normal 09/11/2023    WBCUA 0-5 09/11/2023       Radiology:  Imaging Results              US Doppler Renal Artery and Vein (xpd) (Final result)  Result time 09/12/23 12:27:06      Final result by Rodrigo Alegria MD (09/12/23 12:27:06)                   Impression:      1. Medical renal disease.  2. No hydronephrosis.  1.    Electronically signed by: Rodrigo Alegria  Date:    09/12/2023  Time:    12:27               Narrative:    EXAMINATION:  US DOPPLER RENAL ARTERY AND VEIN (XPD)    CLINICAL HISTORY:  hypertensive emergency;    COMPARISON:  11 September 2023    FINDINGS:  Grayscale, color and spectral Doppler sonographic evaluation of the kidneys and renal vasculature.    The right kidney measures 8.5 cm. The left kidney measures 8 cm.   No hydronephrosis.  There is increased renal parenchymal echogenicity.  There is a right renal cyst measuring 3-4 cm.  No follow-up is needed.    There are elevated resistive indices in segmental renal arteries.  No significantly elevated velocities in the main renal arteries.  Renal veins are patent.                                       US Retroperitoneal Complete (Final result)  Result time 09/11/23 15:39:56      Final result by Malcom Castro MD (09/11/23 15:39:56)                   Narrative:    EXAMINATION  US RETROPERITONEAL COMPLETE    CLINICAL HISTORY  rule out hydronephrosis;    TECHNIQUE  Multiplanar grayscale sonographic images were obtained of the retroperitoneum and pelvis.    COMPARISON  None available at the time of initial interpretation.    FINDINGS  Exam quality: adequate for evaluation    Kidneys: The right kidney measures 8.2 cm x 3.4 cm  x 4.4 cm, with the left kidney 8.1 cm x 3.9 cm x 3.9 cm.  The bilateral renal parenchyma is echogenic relative to the adjacent liver and spleen.  No solid mass-like lesion or complex cyst is identified.  A simple right lower pole cyst measures up to 3.1 cm in diameter, with additional simple cyst at the left upper renal cortex measuring 1.1 cm.  No collecting system dilatation.  No shadowing calcification is identified.  No perinephric collection or free abdominal fluid.    Bladder: No convincing intraluminal abnormality.  No wall thickening or focal mural lesion.    IMPRESSION  1. Increased echogenicity of the kidneys is nonspecific but suggest element of chronic medical renal disease.  2. Simple bilateral cortical cysts.  3. No evidence of acute urologic abnormality.      Electronically signed by: Malcom Castro  Date:    09/11/2023  Time:    15:39                                     CT Chest Without Contrast (Final result)  Result time 09/11/23 15:38:00      Final result by Malcom Castro MD (09/11/23 15:38:00)                   Narrative:    EXAMINATION  CT CHEST WITHOUT CONTRAST    CLINICAL HISTORY  possible new onset chf vs pneumonia;    TECHNIQUE  Non-contrast helical-acquisition CT images were obtained and multiplanar reformats accomplished by a CT technologist at a separate workstation, pushed to PACS for physician review.    COMPARISON  Chest radiograph obtained earlier the same day.    FINDINGS  Images were reviewed in lung, soft tissue, and bone windows.    Exam quality: Limited secondary to patient movement throughout image acquisition, with resulting artifact.  Additionally, non-contrast protocol further limits overall diagnostic value.    Lines/tubes: none visualized    Heart chambers are prominent in size.  There is scattered vascular calcification, with no aneurysmal dilatation or other focal abnormality identified.  Hypoattenuation of the blood pool is nonspecific but suggest element of anemia.   There is no significant pericardial fluid.    Central airway patency is widely maintained.  Patchy bilateral multilobar areas of ground-glass airspace attenuation noted, with central predominance.  There are also small volume layering bilateral pleural effusions.  No loculated fluid component is identified.  There is no pneumothorax.    No convincing acute abnormality of the included upper abdominal solid organs.  Simple posterior right renal cortex cyst is incidentally identified.  The thyroid tissue is unremarkable.  No acute or destructive skeletal abnormality is identified.    IMPRESSION  1. Findings suggestive of central vascular congestion and developing pulmonary edema.  2. Small volume bilateral pleural effusions.  3. Nonspecific hypodensity of the blood pool, can be seen with anemia.    RADIATION DOSE  Automated tube current modulation, weight-based exposure dosing, and/or iterative reconstruction technique utilized to reach lowest reasonably achievable exposure rate.    DLP: 203 mGy*cm      Electronically signed by: Malcom Castro  Date:    09/11/2023  Time:    15:38                                     X-Ray Chest 1 View (Final result)  Result time 09/11/23 13:33:20      Final result by Malcom Castro MD (09/11/23 13:33:20)                   Narrative:    EXAMINATION  XR CHEST 1 VIEW    CLINICAL HISTORY  sob;    TECHNIQUE  A total of 1 frontal image(s) of the chest.    COMPARISON  None available at the time of initial interpretation.    FINDINGS  Lines/tubes/devices: none present    The cardiomediastinal silhouette and central pulmonary vasculature are unremarkable for utilized technique.  The trachea is midline.  Ill-defined right perihilar and basilar infiltrate noted, with no organized lobar consolidation identified.  Left lung field is clear.  There is no large pleural effusion or convincing pneumothorax.    There is no acute osseous or extrathoracic abnormality.    IMPRESSION  Ill-defined right  perihilar/basilar infiltrate may represent developing atypical infectious process.      Electronically signed by: Malcom Castro  Date:    09/11/2023  Time:    13:33                                  Current Medications:     Infusions:       Scheduled:   amLODIPine  5 mg Oral Daily    aspirin  81 mg Oral Daily    calcitRIOL  0.25 mcg Oral Every other day    carvediloL  25 mg Oral BID    heparin (porcine)  5,000 Units Subcutaneous Q12H    hydrALAZINE  50 mg Oral TID    isosorbide dinitrate  20 mg Oral TID    mupirocin   Nasal BID    pantoprazole  40 mg Oral QHS    sevelamer carbonate  800 mg Oral TID WM        PRN:  dextrose 10%, dextrose 10%, glucagon (human recombinant), glucose, glucose, hydrALAZINE, labetalol, naloxone, sodium chloride 0.9%  Assessment & Plan:     Acute renal failure  Secondary Hyperparathyroidism  -BUN//12 upon initial presentation  -renal ultrasound negative for renal artery stenosis  -further workup pending  -nephrology consulted; appreciate assistance  -dialysis per nephro; will defer management of hyperparathyroidism to nephro  -patient will need continued dialysis post discharge  -general surgery consulted; planning for tunnel cath placement (09/19/2023)    Hypertension  -bp difficult to control as he intermittently is normotensive and hypertensive  -increased hydralazine to 75 t.i.d.  -continue carvedilol 25 mg BID and isosorbide dinitrate 20 mg t.i.d., and amlodipine 5 mg     HFrEF  NSTEMI, suspect demand ischemia  -LVEF 45% 09/11/2023  -Troponin peaked at 0.304, which may be confounded by patient's acute renal failure  -Lexiscan was done 09/13/2023, normal scan low risk study. Unable to perform LHC in the setting of acute renal failure  -Unable to start ACE/ARB/ARNI, MRA, or SGLT2 given his renal failure  -continue carvedilol 25 mg BID and isosorbide dinitrate 20 mg t.i.d., and amlodipine 5 mg     Pulmonary Hypertension  - estimated pulmonary artery systolic pressure was 79 on TTE  -  CT PE was negative for pulmonary embolus, only remarkable for pulmonary edema  - will need further workup with outpatient sleep study, and if negative, would benefit from right heart catheterization as outpatient    Disposition:  Continue inpatient management for tunnel catheter placement for continued dialysis post discharge.    The patient was seen with and plan was discussed with Dr. Andres, who agrees.    Abner Weaver MD  [facility] [place] Internal [facility], PGY-1

## 2023-09-17 LAB
ANION GAP SERPL CALC-SCNC: 11 MEQ/L
BASOPHILS # BLD AUTO: 0.04 X10(3)/MCL
BASOPHILS NFR BLD AUTO: 0.6 %
BUN SERPL-MCNC: 46.6 MG/DL (ref 8.4–25.7)
CALCIUM SERPL-MCNC: 7.9 MG/DL (ref 8.8–10)
CHLORIDE SERPL-SCNC: 99 MMOL/L (ref 98–107)
CO2 SERPL-SCNC: 22 MMOL/L (ref 23–31)
CREAT SERPL-MCNC: 7.69 MG/DL (ref 0.73–1.18)
CREAT/UREA NIT SERPL: 6
EOSINOPHIL # BLD AUTO: 0.37 X10(3)/MCL (ref 0–0.9)
EOSINOPHIL NFR BLD AUTO: 5.5 %
ERYTHROCYTE [DISTWIDTH] IN BLOOD BY AUTOMATED COUNT: 15.1 % (ref 11.5–17)
GFR SERPLBLD CREATININE-BSD FMLA CKD-EPI: 7 MLS/MIN/1.73/M2
GLUCOSE SERPL-MCNC: 93 MG/DL (ref 82–115)
HCT VFR BLD AUTO: 33.1 % (ref 42–52)
HGB BLD-MCNC: 10.7 G/DL (ref 14–18)
IMM GRANULOCYTES # BLD AUTO: 0.02 X10(3)/MCL (ref 0–0.04)
IMM GRANULOCYTES NFR BLD AUTO: 0.3 %
LYMPHOCYTES # BLD AUTO: 1.79 X10(3)/MCL (ref 0.6–4.6)
LYMPHOCYTES NFR BLD AUTO: 26.7 %
MCH RBC QN AUTO: 25.1 PG (ref 27–31)
MCHC RBC AUTO-ENTMCNC: 32.3 G/DL (ref 33–36)
MCV RBC AUTO: 77.5 FL (ref 80–94)
MONOCYTES # BLD AUTO: 0.9 X10(3)/MCL (ref 0.1–1.3)
MONOCYTES NFR BLD AUTO: 13.4 %
NEUTROPHILS # BLD AUTO: 3.58 X10(3)/MCL (ref 2.1–9.2)
NEUTROPHILS NFR BLD AUTO: 53.5 %
NRBC BLD AUTO-RTO: 0 %
PHOSPHATE SERPL-MCNC: 6.6 MG/DL (ref 2.3–4.7)
PLATELET # BLD AUTO: 219 X10(3)/MCL (ref 130–400)
PMV BLD AUTO: 9.5 FL (ref 7.4–10.4)
POTASSIUM SERPL-SCNC: 4.1 MMOL/L (ref 3.5–5.1)
RBC # BLD AUTO: 4.27 X10(6)/MCL (ref 4.7–6.1)
SODIUM SERPL-SCNC: 132 MMOL/L (ref 136–145)
WBC # SPEC AUTO: 6.7 X10(3)/MCL (ref 4.5–11.5)

## 2023-09-17 PROCEDURE — 80048 BASIC METABOLIC PNL TOTAL CA: CPT | Performed by: NURSE PRACTITIONER

## 2023-09-17 PROCEDURE — 84100 ASSAY OF PHOSPHORUS: CPT | Performed by: NURSE PRACTITIONER

## 2023-09-17 PROCEDURE — 25000003 PHARM REV CODE 250

## 2023-09-17 PROCEDURE — 21400001 HC TELEMETRY ROOM

## 2023-09-17 PROCEDURE — 63600175 PHARM REV CODE 636 W HCPCS: Performed by: STUDENT IN AN ORGANIZED HEALTH CARE EDUCATION/TRAINING PROGRAM

## 2023-09-17 PROCEDURE — 25000003 PHARM REV CODE 250: Performed by: NURSE PRACTITIONER

## 2023-09-17 PROCEDURE — 25000003 PHARM REV CODE 250: Performed by: STUDENT IN AN ORGANIZED HEALTH CARE EDUCATION/TRAINING PROGRAM

## 2023-09-17 PROCEDURE — 85025 COMPLETE CBC W/AUTO DIFF WBC: CPT | Performed by: STUDENT IN AN ORGANIZED HEALTH CARE EDUCATION/TRAINING PROGRAM

## 2023-09-17 RX ORDER — SEVELAMER CARBONATE 800 MG/1
1600 TABLET, FILM COATED ORAL
Status: DISCONTINUED | OUTPATIENT
Start: 2023-09-17 | End: 2023-09-18

## 2023-09-17 RX ADMIN — HEPARIN SODIUM 5000 UNITS: 5000 INJECTION, SOLUTION INTRAVENOUS; SUBCUTANEOUS at 08:09

## 2023-09-17 RX ADMIN — SEVELAMER CARBONATE 1600 MG: 800 TABLET, FILM COATED ORAL at 12:09

## 2023-09-17 RX ADMIN — PANTOPRAZOLE SODIUM 40 MG: 40 TABLET, DELAYED RELEASE ORAL at 08:09

## 2023-09-17 RX ADMIN — CARVEDILOL 25 MG: 12.5 TABLET, FILM COATED ORAL at 08:09

## 2023-09-17 RX ADMIN — HYDRALAZINE HYDROCHLORIDE 75 MG: 50 TABLET ORAL at 03:09

## 2023-09-17 RX ADMIN — ASPIRIN 81 MG CHEWABLE TABLET 81 MG: 81 TABLET CHEWABLE at 08:09

## 2023-09-17 RX ADMIN — ISOSORBIDE DINITRATE 20 MG: 20 TABLET ORAL at 08:09

## 2023-09-17 RX ADMIN — SEVELAMER CARBONATE 800 MG: 800 TABLET, FILM COATED ORAL at 08:09

## 2023-09-17 RX ADMIN — AMLODIPINE BESYLATE 5 MG: 5 TABLET ORAL at 08:09

## 2023-09-17 RX ADMIN — ISOSORBIDE DINITRATE 20 MG: 20 TABLET ORAL at 03:09

## 2023-09-17 RX ADMIN — HYDRALAZINE HYDROCHLORIDE 75 MG: 50 TABLET ORAL at 08:09

## 2023-09-17 RX ADMIN — SEVELAMER CARBONATE 1600 MG: 800 TABLET, FILM COATED ORAL at 04:09

## 2023-09-17 NOTE — PLAN OF CARE
Problem: Adult Inpatient Plan of Care  Goal: Plan of Care Review  Outcome: Ongoing, Progressing  Goal: Patient-Specific Goal (Individualized)  Outcome: Ongoing, Progressing  Goal: Absence of Hospital-Acquired Illness or Injury  Outcome: Ongoing, Progressing  Goal: Optimal Comfort and Wellbeing  Outcome: Ongoing, Progressing  Goal: Readiness for Transition of Care  Outcome: Ongoing, Progressing     Problem: Device-Related Complication Risk (Hemodialysis)  Goal: Safe, Effective Therapy Delivery  Outcome: Ongoing, Progressing     Problem: Hemodynamic Instability (Hemodialysis)  Goal: Effective Tissue Perfusion  Outcome: Ongoing, Progressing     Problem: Infection (Hemodialysis)  Goal: Absence of Infection Signs and Symptoms  Outcome: Ongoing, Progressing     Problem: Infection  Goal: Absence of Infection Signs and Symptoms  Outcome: Ongoing, Progressing     Problem: Skin Injury Risk Increased  Goal: Skin Health and Integrity  Outcome: Ongoing, Progressing     Problem: Adjustment to Illness (Heart Failure)  Goal: Optimal Coping  Outcome: Ongoing, Progressing     Problem: Cardiac Output Decreased (Heart Failure)  Goal: Optimal Cardiac Output  Outcome: Ongoing, Progressing     Problem: Fluid Imbalance (Heart Failure)  Goal: Fluid Balance  Outcome: Ongoing, Progressing     Problem: Oral Intake Inadequate (Heart Failure)  Goal: Optimal Nutrition Intake  Outcome: Ongoing, Progressing

## 2023-09-17 NOTE — PROGRESS NOTES
[facility] Internal Medicine Wards Progress Note     Resident Team: [facility] Medicine List 1  Attending Physician: Blanca Andres MD  Resident: Rodrigo Pelaez MD  Intern: Abner Weaver MD     Subjective:      Brief HPI:  Ryann Ly is a 63 y.o. male with unknown PMH who presents to [facility] 9/11/2023 for episodes of shortness of breath occurring at night for the past week.  Patient states he has been having some productive cough with clearish-whitish sputum.  States he moved to the [facility] from [place] about 3 years ago, reports having been seen at an urgent care about a year ago and been started on some unspecified medication for an unspecified problem although has not been taking any medication for some time.  He does report having some reflux symptoms.  Patient denies any chest pain, N/V/D, abdominal pain, leg swelling, dizziness/headache, palpitations, fever, chills, urinary symptoms, hematuria, hemoptysis. Pt states he still makes adequate urine.  Denies any known sick contacts. Despite lab abnormalities patient appears very comfortable, is asking how long he will be staying      In the ED BP was 230/130, with trop elevated at 0.298, BNP 1656. Normal lactate and TSH. Negative flu/covid.    Interval History: No acute events overnight.  Afebrile, hemodynamically stable.  Still hypertensive though appears improved from yesterday.  He has no acute concerns or complaints today.  Nephrology with plans to dialyze him tomorrow.    Review of Systems:  Review of Systems   Constitutional:  Negative for chills, diaphoresis, fatigue and fever.   HENT:  Negative for ear pain, hearing loss, rhinorrhea, sore throat, tinnitus and trouble swallowing.    Eyes:  Negative for pain, redness and visual disturbance.   Respiratory:  Negative for cough, chest tightness and shortness of breath.    Cardiovascular:  Negative for chest pain and palpitations.   Gastrointestinal:  Negative for abdominal pain, constipation, diarrhea, nausea and vomiting.    Genitourinary:  Negative for difficulty urinating, dysuria, frequency, hematuria and urgency.   Musculoskeletal:  Negative for arthralgias and myalgias.   Skin:  Negative for rash and wound.   Neurological:  Negative for dizziness, seizures, syncope, weakness, light-headedness, numbness and headaches.   Psychiatric/Behavioral:  Negative for confusion.         Objective:     Last 24 Hour Vital Signs:  BP  Min: 101/49  Max: 159/83  Temp  Av.1 °F (36.7 °C)  Min: 97.7 °F (36.5 °C)  Max: 98.4 °F (36.9 °C)  Pulse  Av  Min: 60  Max: 74  Resp  Av  Min: 16  Max: 20  SpO2  Av %  Min: 96 %  Max: 98 %  Weight  Av.1 kg (117 lb 1 oz)  Min: 53.1 kg (117 lb 1 oz)  Max: 53.1 kg (117 lb 1 oz)  I/O last 3 completed shifts:  In: 240 [P.O.:240]  Out: -     Physical Examination:  Physical Exam  Vitals reviewed.   Constitutional:       General: He is not in acute distress.     Appearance: Normal appearance. He is normal weight. He is not ill-appearing.   HENT:      Head: Normocephalic and atraumatic.      Comments: RIJ catheter in place     Right Ear: External ear normal.      Left Ear: External ear normal.   Eyes:      General: No scleral icterus.     Extraocular Movements: Extraocular movements intact.      Conjunctiva/sclera: Conjunctivae normal.      Pupils: Pupils are equal, round, and reactive to light.   Cardiovascular:      Rate and Rhythm: Normal rate and regular rhythm.      Heart sounds: No murmur heard.     No friction rub. No gallop.   Pulmonary:      Effort: Pulmonary effort is normal.      Breath sounds: No wheezing, rhonchi or rales.   Abdominal:      General: Bowel sounds are normal. There is no distension.      Palpations: Abdomen is soft.   Musculoskeletal:         General: Normal range of motion.      Right lower leg: Edema (trace) present.      Left lower leg: Edema (trace) present.   Skin:     General: Skin is warm and dry.      Capillary Refill: Capillary refill takes less than 2 seconds.    Neurological:      General: No focal deficit present.      Mental Status: He is alert and oriented to person, place, and time. Mental status is at baseline.      Cranial Nerves: No cranial nerve deficit.         Laboratory:  Most Recent Data:  CBC:   Lab Results   Component Value Date    WBC 6.70 09/17/2023    HGB 10.7 (L) 09/17/2023    HCT 33.1 (L) 09/17/2023     09/17/2023    MCV 77.5 (L) 09/17/2023    RDW 15.1 09/17/2023     WBC Differential:   Recent Labs   Lab 09/13/23  0808 09/14/23  0310 09/15/23  0312 09/16/23  0254 09/17/23  0412   WBC 5.69 8.03 8.08 6.20 6.70   HGB 7.5* 10.5* 10.8* 11.2* 10.7*   HCT 23.7* 32.9* 33.6* 35.3* 33.1*    174 186 212 219   MCV 75.5* 77.8* 77.6* 77.4* 77.5*       BMP:   Lab Results   Component Value Date     (L) 09/17/2023    K 4.1 09/17/2023    CO2 22 (L) 09/17/2023    BUN 46.6 (H) 09/17/2023    CREATININE 7.69 (H) 09/17/2023    CALCIUM 7.9 (L) 09/17/2023    MG 2.00 09/14/2023    PHOS 6.6 (H) 09/17/2023     LFTs:   Lab Results   Component Value Date    ALBUMIN 2.3 (L) 09/15/2023    BILITOT 0.4 09/15/2023    AST 27 09/15/2023    ALKPHOS 47 09/15/2023    ALT 26 09/15/2023     Coags:   Lab Results   Component Value Date    INR 1.1 09/11/2023    PROTIME 13.5 09/11/2023    PTT 75.6 (H) 09/12/2023     FLP:   Lab Results   Component Value Date    CHOL 166 09/12/2023    HDL 43 09/12/2023    TRIG 47 09/12/2023     DM:   Lab Results   Component Value Date    HGBA1C 5.6 09/12/2023    CREATININE 7.69 (H) 09/17/2023     Thyroid:   Lab Results   Component Value Date    TSH 1.822 09/11/2023     Anemia:   Lab Results   Component Value Date    IRON 63 (L) 09/11/2023    TIBC 197 (L) 09/11/2023    FERRITIN 487.55 (H) 09/11/2023     Cardiac:   Lab Results   Component Value Date    TROPONINI 0.228 (H) 09/12/2023    BNP 1,656.2 (H) 09/11/2023     Urinalysis:   Lab Results   Component Value Date    PHUA 6.0 09/11/2023    UROBILINOGEN Normal 09/11/2023    WBCUA 0-5 09/11/2023        Radiology:  Imaging Results              US Doppler Renal Artery and Vein (xpd) (Final result)  Result time 09/12/23 12:27:06      Final result by Rodrigo Alegria MD (09/12/23 12:27:06)                   Impression:      1. Medical renal disease.  2. No hydronephrosis.  1.    Electronically signed by: Rodrigo Alegria  Date:    09/12/2023  Time:    12:27               Narrative:    EXAMINATION:  US DOPPLER RENAL ARTERY AND VEIN (XPD)    CLINICAL HISTORY:  hypertensive emergency;    COMPARISON:  11 September 2023    FINDINGS:  Grayscale, color and spectral Doppler sonographic evaluation of the kidneys and renal vasculature.    The right kidney measures 8.5 cm. The left kidney measures 8 cm.   No hydronephrosis.  There is increased renal parenchymal echogenicity.  There is a right renal cyst measuring 3-4 cm.  No follow-up is needed.    There are elevated resistive indices in segmental renal arteries.  No significantly elevated velocities in the main renal arteries.  Renal veins are patent.                                       US Retroperitoneal Complete (Final result)  Result time 09/11/23 15:39:56      Final result by Malcom Castro MD (09/11/23 15:39:56)                   Narrative:    EXAMINATION  US RETROPERITONEAL COMPLETE    CLINICAL HISTORY  rule out hydronephrosis;    TECHNIQUE  Multiplanar grayscale sonographic images were obtained of the retroperitoneum and pelvis.    COMPARISON  None available at the time of initial interpretation.    FINDINGS  Exam quality: adequate for evaluation    Kidneys: The right kidney measures 8.2 cm x 3.4 cm x 4.4 cm, with the left kidney 8.1 cm x 3.9 cm x 3.9 cm.  The bilateral renal parenchyma is echogenic relative to the adjacent liver and spleen.  No solid mass-like lesion or complex cyst is identified.  A simple right lower pole cyst measures up to 3.1 cm in diameter, with additional simple cyst at the left upper renal cortex measuring 1.1 cm.  No collecting system  dilatation.  No shadowing calcification is identified.  No perinephric collection or free abdominal fluid.    Bladder: No convincing intraluminal abnormality.  No wall thickening or focal mural lesion.    IMPRESSION  1. Increased echogenicity of the kidneys is nonspecific but suggest element of chronic medical renal disease.  2. Simple bilateral cortical cysts.  3. No evidence of acute urologic abnormality.      Electronically signed by: Malcom Castro  Date:    09/11/2023  Time:    15:39                                     CT Chest Without Contrast (Final result)  Result time 09/11/23 15:38:00      Final result by Malcom Castro MD (09/11/23 15:38:00)                   Narrative:    EXAMINATION  CT CHEST WITHOUT CONTRAST    CLINICAL HISTORY  possible new onset chf vs pneumonia;    TECHNIQUE  Non-contrast helical-acquisition CT images were obtained and multiplanar reformats accomplished by a CT technologist at a separate workstation, pushed to PACS for physician review.    COMPARISON  Chest radiograph obtained earlier the same day.    FINDINGS  Images were reviewed in lung, soft tissue, and bone windows.    Exam quality: Limited secondary to patient movement throughout image acquisition, with resulting artifact.  Additionally, non-contrast protocol further limits overall diagnostic value.    Lines/tubes: none visualized    Heart chambers are prominent in size.  There is scattered vascular calcification, with no aneurysmal dilatation or other focal abnormality identified.  Hypoattenuation of the blood pool is nonspecific but suggest element of anemia.  There is no significant pericardial fluid.    Central airway patency is widely maintained.  Patchy bilateral multilobar areas of ground-glass airspace attenuation noted, with central predominance.  There are also small volume layering bilateral pleural effusions.  No loculated fluid component is identified.  There is no pneumothorax.    No convincing acute abnormality  of the included upper abdominal solid organs.  Simple posterior right renal cortex cyst is incidentally identified.  The thyroid tissue is unremarkable.  No acute or destructive skeletal abnormality is identified.    IMPRESSION  1. Findings suggestive of central vascular congestion and developing pulmonary edema.  2. Small volume bilateral pleural effusions.  3. Nonspecific hypodensity of the blood pool, can be seen with anemia.    RADIATION DOSE  Automated tube current modulation, weight-based exposure dosing, and/or iterative reconstruction technique utilized to reach lowest reasonably achievable exposure rate.    DLP: 203 mGy*cm      Electronically signed by: Malcom Castro  Date:    09/11/2023  Time:    15:38                                     X-Ray Chest 1 View (Final result)  Result time 09/11/23 13:33:20      Final result by Malcom Castro MD (09/11/23 13:33:20)                   Narrative:    EXAMINATION  XR CHEST 1 VIEW    CLINICAL HISTORY  sob;    TECHNIQUE  A total of 1 frontal image(s) of the chest.    COMPARISON  None available at the time of initial interpretation.    FINDINGS  Lines/tubes/devices: none present    The cardiomediastinal silhouette and central pulmonary vasculature are unremarkable for utilized technique.  The trachea is midline.  Ill-defined right perihilar and basilar infiltrate noted, with no organized lobar consolidation identified.  Left lung field is clear.  There is no large pleural effusion or convincing pneumothorax.    There is no acute osseous or extrathoracic abnormality.    IMPRESSION  Ill-defined right perihilar/basilar infiltrate may represent developing atypical infectious process.      Electronically signed by: Malcom Castro  Date:    09/11/2023  Time:    13:33                                  Current Medications:     Infusions:       Scheduled:   amLODIPine  5 mg Oral Daily    aspirin  81 mg Oral Daily    calcitRIOL  0.25 mcg Oral Every other day    carvediloL  25 mg  Oral BID    heparin (porcine)  5,000 Units Subcutaneous Q12H    hydrALAZINE  75 mg Oral TID    isosorbide dinitrate  20 mg Oral TID    pantoprazole  40 mg Oral QHS    sevelamer carbonate  1,600 mg Oral TID WM        PRN:  dextrose 10%, dextrose 10%, glucagon (human recombinant), glucose, glucose, hydrALAZINE, labetalol, naloxone, sodium chloride 0.9%  Assessment & Plan:     Acute renal failure  Secondary Hyperparathyroidism  -BUN//12 upon initial presentation  -further workup to determine etiology is still pending  -dialysis per nephro; will defer management of hyperparathyroidism to nephro  -per nephrology, will likely need lifelong dialysis  -plan for tunneled catheter placement Tuesday 09/19/2023    Hypertension  -continue carvedilol 25 mg BID, hydralazine 75 TID, isosorbide dinitrate 20 mg TID, and amlodipine 5 mg QD     HFrEF  -LVEF 45% 09/11/2023  -Unable to start ACE/ARB/ARNI, MRA, or SGLT2 given his renal failure  -continue carvedilol 25 mg BID and hydralazine/isosorbide dinitrate     Pulmonary Hypertension  - estimated pulmonary artery systolic pressure was 79 on TTE  - CT PE was negative for pulmonary embolus, only remarkable for pulmonary edema  - will need further workup with outpatient sleep study, and if negative, would benefit from right heart catheterization as outpatient    Disposition:  Continue inpatient management.  Continue dialysis per Nephrology.  Tentative plan for tunneled catheter placement on Tuesday    Rodrigo Pelaez MD  [facility] Internal [facility], PGY-3

## 2023-09-17 NOTE — PROGRESS NOTES
"[facility]  Nephrology Progress Note  Patient Name: Ryann Ly  Age: 63 y.o.  : 1960  MRN: [id number]  Admission Date: 2023    Chief complaint: Hypertension (C/o high blood pressure at night when he goes to bed and is unable to sleep because he gets sob. Bp at present 230/137. Need referral for pcp)    Hospital course  Ryann Ly is a 63 y.o.   male who presented to the emergency department on 2023 with complaints of shortness of breath and cough.  Patient had no diagnosed medical problems, however, his blood pressure on presentation was 230/130, laboratory results were remarkable for azotemia, hyperkalemia, metabolic acidosis, anemia, and elevated BNP.  Patient was admitted to medicine service, Nephrology was consulted for assistance with management of acute kidney injury, patient was started on hemodialysis on 2023.    Subjective  No acute events overnight.      Review of Systems  Cardiovascular:  Negative for chest pain   Respiratory: Negative for shortness of breath    Objective  BP (!) 154/85   Pulse 72   Temp 98.3 °F (36.8 °C) (Oral)   Resp 20   Ht 5' 2" (1.575 m)   Wt 53.1 kg (117 lb 1 oz)   SpO2 96%   BMI 21.41 kg/m²     Intake/Output Summary (Last 24 hours) at 2023 0803  Last data filed at 2023 1322  Gross per 24 hour   Intake 240 ml   Output --   Net 240 ml         Physical Exam  General appearance: Patient is in no acute distress.  Skin: No rashes or wounds.  HEENT: PERRLA, EOMI, no scleral icterus, no JVD. Neck is supple.  Right IJ temporary dialysis catheter is in place  Chest: Respirations are unlabored. Lungs sounds are clear.   Heart: S1, S2.   Abdomen: Benign.  : Deferred.  Extremities: No edema, peripheral pulses are palpable.   Neuro: No focal deficits.     Medications    Current Facility-Administered Medications:     amLODIPine tablet 5 mg, 5 mg, Oral, Daily, Abner Weaver MD, 5 mg at 23 0819    aspirin " chewable tablet 81 mg, 81 mg, Oral, Daily, Stroda, Austin, DO, 81 mg at 09/16/23 0819    calcitRIOL capsule 0.25 mcg, 0.25 mcg, Oral, Every other day, Breanna Webster, AFRICAP, 0.25 mcg at 09/16/23 0819    carvediloL tablet 25 mg, 25 mg, Oral, BID, Rodrigo Pelaez MD, 25 mg at 09/16/23 2107    dextrose 10% bolus 125 mL 125 mL, 12.5 g, Intravenous, PRN, Stroda, Austin, DO    dextrose 10% bolus 250 mL 250 mL, 25 g, Intravenous, PRN, Stroda, Austin, DO    glucagon (human recombinant) injection 1 mg, 1 mg, Intramuscular, PRN, Stroda, Austin, DO    glucose chewable tablet 16 g, 16 g, Oral, PRN, Stroda, Austin, DO    glucose chewable tablet 24 g, 24 g, Oral, PRN, Stroda, Austin, DO    heparin (porcine) injection 5,000 Units, 5,000 Units, Subcutaneous, Q12H, Rodrigo Pelaez MD, 5,000 Units at 09/16/23 2107    hydrALAZINE injection 10 mg, 10 mg, Intravenous, Q2H PRN, Rodrigo Pelaez MD, 10 mg at 09/15/23 0558    hydrALAZINE tablet 75 mg, 75 mg, Oral, TID, Abner Weaver MD, 75 mg at 09/16/23 2108    isosorbide dinitrate tablet 20 mg, 20 mg, Oral, TID, Abner Weaver MD, 20 mg at 09/16/23 2108    labetalol 20 mg/4 mL (5 mg/mL) IV syring, 10 mg, Intravenous, Q2H PRN, Rodrigo Pelaez MD, 10 mg at 09/13/23 0417    naloxone 0.4 mg/mL injection 0.02 mg, 0.02 mg, Intravenous, PRN, Stroda, Uastin, DO    pantoprazole EC tablet 40 mg, 40 mg, Oral, QHS, Stroda, Austin, DO, 40 mg at 09/16/23 2108    sevelamer carbonate tablet 800 mg, 800 mg, Oral, TID WM, Magay, Breanna A, FNP, 800 mg at 09/16/23 1647    sodium chloride 0.9% flush 10 mL, 10 mL, Intravenous, Q12H PRN, Austin Giraldo DO     Imaging:    Reviewed    Laboratory Data:  Hematology  Lab Results   Component Value Date    WBC 6.70 09/17/2023    HGB 10.7 (L) 09/17/2023    HCT 33.1 (L) 09/17/2023     09/17/2023     (L) 09/17/2023    K 4.1 09/17/2023    CHLORIDE 99 09/17/2023    CO2 22 (L) 09/17/2023    BUN 46.6 (H) 09/17/2023    CREATININE 7.69 (H) 09/17/2023    EGFRNORACEVR 7 09/17/2023     GLUCOSE 93 09/17/2023    CALCIUM 7.9 (L) 09/17/2023    ALKPHOS 47 09/15/2023    LABPROT 5.6 (L) 09/15/2023    ALBUMIN 2.3 (L) 09/15/2023    AST 27 09/15/2023    ALT 26 09/15/2023    MG 2.00 09/14/2023    PHOS 6.6 (H) 09/17/2023     Lab Results   Component Value Date    IRON 63 (L) 09/11/2023    TIBC 197 (L) 09/11/2023    TRANS 167 (L) 09/11/2023    LABIRON 32 09/11/2023    FERRITIN 487.55 (H) 09/11/2023    HAPTO 204 09/11/2023       Lab Results   Component Value Date    HIV Nonreactive 09/16/2023    HEPCAB Nonreactive 09/15/2023    HEPBSURFAG Nonreactive 09/12/2023    HEPBSAB Nonreactive 09/12/2023       Impression  Acute kidney injury versus progression to ESRD   Nephrotic range proteinuria  Secondary hyperparathyroidism   Hyperphosphatemia  Hypertension   Anemia of chronic disease   NSTEMI  HFrEF (LVEF 45% as of 09/11/2023)    Plan  Evaluation of proteinuria revealed negative JAMES, negative P-ANCA, negative C-ANCA, normal complement levels, MPO, PR3, and GBM antibodies below cutoff, hemoglobin A1c of 5.6, and negative serologies for hepatitis-B and C, HIV nonreactive.  SPEP results are pending.  Since there is no evidence of functional renal recovery, patient will need to continue hemodialysis indefinitely.  Will likely continue HD per Monday, Wednesday, Friday schedule while hospitalized.  Patient will need a tunneled catheter placed prior to being discharged, vascular surgery team is aware, procedure is tentatively scheduled for early next week.    Breanna Webster NP  [facility] Nephrology   9/17/2023

## 2023-09-18 LAB
ALBUMIN % SPEP (OHS): 45.62
ALBUMIN SERPL-MCNC: 2.4 G/DL (ref 3.4–4.8)
ALBUMIN/GLOB SERPL: 0.9 RATIO (ref 1.1–2)
ALPHA 1 GLOB (OHS): 0.27 GM/DL
ALPHA 1 GLOB% (OHS): 5.24
ALPHA 2 GLOB % (OHS): 14.18
ALPHA 2 GLOB (OHS): 0.74 GM/DL
ANION GAP SERPL CALC-SCNC: 13 MEQ/L
BASOPHILS # BLD AUTO: 0.04 X10(3)/MCL
BASOPHILS NFR BLD AUTO: 0.6 %
BETA GLOB (OHS): 0.86 GM/DL
BETA GLOB% (OHS): 16.62
BUN SERPL-MCNC: 57.6 MG/DL (ref 8.4–25.7)
CALCIUM SERPL-MCNC: 8 MG/DL (ref 8.8–10)
CHLORIDE SERPL-SCNC: 100 MMOL/L (ref 98–107)
CO2 SERPL-SCNC: 20 MMOL/L (ref 23–31)
CREAT SERPL-MCNC: 9.32 MG/DL (ref 0.73–1.18)
CREAT/UREA NIT SERPL: 6
EOSINOPHIL # BLD AUTO: 0.33 X10(3)/MCL (ref 0–0.9)
EOSINOPHIL NFR BLD AUTO: 4.6 %
ERYTHROCYTE [DISTWIDTH] IN BLOOD BY AUTOMATED COUNT: 14.9 % (ref 11.5–17)
GAMMA GLOBULIN % (OHS): 18.35
GAMMA GLOBULIN (OHS): 0.95 GM/DL
GFR SERPLBLD CREATININE-BSD FMLA CKD-EPI: 6 MLS/MIN/1.73/M2
GLOBULIN SER-MCNC: 2.8 GM/DL (ref 2.4–3.5)
GLUCOSE SERPL-MCNC: 83 MG/DL (ref 82–115)
HCT VFR BLD AUTO: 34.1 % (ref 42–52)
HGB BLD-MCNC: 10.7 G/DL (ref 14–18)
IMM GRANULOCYTES # BLD AUTO: 0.03 X10(3)/MCL (ref 0–0.04)
IMM GRANULOCYTES NFR BLD AUTO: 0.4 %
LYMPHOCYTES # BLD AUTO: 1.56 X10(3)/MCL (ref 0.6–4.6)
LYMPHOCYTES NFR BLD AUTO: 21.9 %
M SPIKE % (OHS): ABNORMAL
M SPIKE (OHS): ABNORMAL
MCH RBC QN AUTO: 24.2 PG (ref 27–31)
MCHC RBC AUTO-ENTMCNC: 31.4 G/DL (ref 33–36)
MCV RBC AUTO: 77 FL (ref 80–94)
MONOCYTES # BLD AUTO: 0.8 X10(3)/MCL (ref 0.1–1.3)
MONOCYTES NFR BLD AUTO: 11.3 %
NEUTROPHILS # BLD AUTO: 4.35 X10(3)/MCL (ref 2.1–9.2)
NEUTROPHILS NFR BLD AUTO: 61.2 %
NRBC BLD AUTO-RTO: 0 %
PATH REV: NORMAL
PLATELET # BLD AUTO: 248 X10(3)/MCL (ref 130–400)
PMV BLD AUTO: 9.8 FL (ref 7.4–10.4)
POTASSIUM SERPL-SCNC: 4.4 MMOL/L (ref 3.5–5.1)
PROT SERPL-MCNC: 5.2 GM/DL (ref 5.8–7.6)
RBC # BLD AUTO: 4.43 X10(6)/MCL (ref 4.7–6.1)
SODIUM SERPL-SCNC: 133 MMOL/L (ref 136–145)
TROPONIN I SERPL-MCNC: 0.07 NG/ML (ref 0–0.04)
WBC # SPEC AUTO: 7.11 X10(3)/MCL (ref 4.5–11.5)

## 2023-09-18 PROCEDURE — 63600175 PHARM REV CODE 636 W HCPCS: Performed by: STUDENT IN AN ORGANIZED HEALTH CARE EDUCATION/TRAINING PROGRAM

## 2023-09-18 PROCEDURE — 93005 ELECTROCARDIOGRAM TRACING: CPT

## 2023-09-18 PROCEDURE — 25000003 PHARM REV CODE 250: Performed by: STUDENT IN AN ORGANIZED HEALTH CARE EDUCATION/TRAINING PROGRAM

## 2023-09-18 PROCEDURE — 25000003 PHARM REV CODE 250: Performed by: INTERNAL MEDICINE

## 2023-09-18 PROCEDURE — 25000003 PHARM REV CODE 250: Performed by: NURSE PRACTITIONER

## 2023-09-18 PROCEDURE — 25000003 PHARM REV CODE 250

## 2023-09-18 PROCEDURE — 85025 COMPLETE CBC W/AUTO DIFF WBC: CPT | Performed by: STUDENT IN AN ORGANIZED HEALTH CARE EDUCATION/TRAINING PROGRAM

## 2023-09-18 PROCEDURE — 80100016 HC MAINTENANCE HEMODIALYSIS

## 2023-09-18 PROCEDURE — 21400001 HC TELEMETRY ROOM

## 2023-09-18 PROCEDURE — 84484 ASSAY OF TROPONIN QUANT: CPT

## 2023-09-18 PROCEDURE — 80048 BASIC METABOLIC PNL TOTAL CA: CPT | Performed by: NURSE PRACTITIONER

## 2023-09-18 RX ORDER — AMLODIPINE BESYLATE 5 MG/1
5 TABLET ORAL NIGHTLY
Status: DISCONTINUED | OUTPATIENT
Start: 2023-09-18 | End: 2023-09-20 | Stop reason: HOSPADM

## 2023-09-18 RX ORDER — SEVELAMER CARBONATE 800 MG/1
800 TABLET, FILM COATED ORAL
Status: DISCONTINUED | OUTPATIENT
Start: 2023-09-18 | End: 2023-09-20 | Stop reason: HOSPADM

## 2023-09-18 RX ADMIN — ISOSORBIDE DINITRATE 20 MG: 20 TABLET ORAL at 08:09

## 2023-09-18 RX ADMIN — HEPARIN SODIUM 5000 UNITS: 5000 INJECTION, SOLUTION INTRAVENOUS; SUBCUTANEOUS at 08:09

## 2023-09-18 RX ADMIN — CARVEDILOL 25 MG: 12.5 TABLET, FILM COATED ORAL at 08:09

## 2023-09-18 RX ADMIN — CALCITRIOL CAPSULES 0.25 MCG 0.25 MCG: 0.25 CAPSULE ORAL at 08:09

## 2023-09-18 RX ADMIN — PANTOPRAZOLE SODIUM 40 MG: 40 TABLET, DELAYED RELEASE ORAL at 08:09

## 2023-09-18 RX ADMIN — HYDRALAZINE HYDROCHLORIDE 75 MG: 50 TABLET ORAL at 08:09

## 2023-09-18 RX ADMIN — SEVELAMER CARBONATE 800 MG: 800 TABLET, FILM COATED ORAL at 12:09

## 2023-09-18 RX ADMIN — SEVELAMER CARBONATE 800 MG: 800 TABLET, FILM COATED ORAL at 06:09

## 2023-09-18 RX ADMIN — HYDRALAZINE HYDROCHLORIDE 10 MG: 20 INJECTION INTRAMUSCULAR; INTRAVENOUS at 06:09

## 2023-09-18 RX ADMIN — AMLODIPINE BESYLATE 5 MG: 5 TABLET ORAL at 08:09

## 2023-09-18 RX ADMIN — SEVELAMER CARBONATE 1600 MG: 800 TABLET, FILM COATED ORAL at 08:09

## 2023-09-18 RX ADMIN — ASPIRIN 81 MG CHEWABLE TABLET 81 MG: 81 TABLET CHEWABLE at 08:09

## 2023-09-18 NOTE — PROGRESS NOTES
"Acute Care and General Surgery   Progress Note  Admit Date: 9/11/2023  HD#7  POD#* No surgery date entered *    Subjective:   Interval history:  AF HDS  No acute complaints  Using temp HD line without issues    Home Meds:No current outpatient medications   Scheduled Meds:   amLODIPine  5 mg Oral Daily    aspirin  81 mg Oral Daily    calcitRIOL  0.25 mcg Oral Every other day    carvediloL  25 mg Oral BID    heparin (porcine)  5,000 Units Subcutaneous Q12H    hydrALAZINE  75 mg Oral TID    isosorbide dinitrate  20 mg Oral TID    pantoprazole  40 mg Oral QHS    sevelamer carbonate  1,600 mg Oral TID WM     Continuous Infusions:  PRN Meds:dextrose 10%, dextrose 10%, glucagon (human recombinant), glucose, glucose, hydrALAZINE, labetalol, naloxone, sodium chloride 0.9%     Objective:     VITAL SIGNS: 24 HR MIN & MAX LAST   Temp  Min: 98.1 °F (36.7 °C)  Max: 98.8 °F (37.1 °C)  98.1 °F (36.7 °C)   BP  Min: 127/70  Max: 154/85  (!) 143/69    Pulse  Min: 67  Max: 72  72    Resp  Min: 16  Max: 20  16    SpO2  Min: 96 %  Max: 97 %  97 %      HT: 5' 2" (157.5 cm)  WT: 53.1 kg (117 lb)  BMI: 21.4     Intake/output:  Intake/Output - Last 3 Shifts         09/16 0700  09/17 0659 09/17 0700 09/18 0659 09/18 0700 09/19 0659    P.O. 240 812     Other       Total Intake(mL/kg) 240 (4.5) 812 (15.3)     Urine (mL/kg/hr)  425 (0.3)     Other       Stool       Total Output  425     Net +240 +387            Urine Occurrence 3 x 3 x     Stool Occurrence 1 x 1 x             Intake/Output Summary (Last 24 hours) at 9/18/2023 0706  Last data filed at 9/18/2023 0450  Gross per 24 hour   Intake 812 ml   Output 425 ml   Net 387 ml         Lines/drains/airway:       Hemodialysis Catheter 09/12/23 right internal jugular (Active)   Line Necessity Review CRRT/HD 09/17/23 0800   Verification by X-ray Yes 09/16/23 0800   Site Assessment No swelling;No redness 09/17/23 2000   Line Securement Device Secured with sutures 09/17/23 0800   Dressing Type " Central line dressing 09/17/23 1800   Dressing Status Clean;Dry;Intact;Old drainage 09/17/23 1800   Dressing Intervention Integrity maintained 09/17/23 1800   Date on Dressing 09/13/23 09/15/23 1122   Dressing Due to be Changed 09/20/23 09/16/23 2000   Venous Patency/Care flushed w/o difficulty;blood return present;normal saline locked 09/15/23 1122   Arterial Patency/Care flushed w/o difficulty;blood return present;normal saline locked 09/15/23 1122   Waveform Not being transduced 09/15/23 1122   Number of days: 6            Peripheral IV - Single Lumen 09/11/23 1608 20 G Left Antecubital (Active)   Site Assessment Clean;Dry;Intact;No redness;No swelling 09/18/23 0600   Extremity Assessment Distal to IV No abnormal discoloration;No redness;No swelling;No warmth 09/17/23 1800   Line Status Flushed;Saline locked 09/17/23 1800   Dressing Status Clean;Dry;Intact 09/17/23 1800   Dressing Intervention Integrity maintained 09/17/23 1800   Number of days: 6     Physical examination:  Gen: NAD, AAOx3, answering questions appropriately  HEENT: RIJ temporary line in place   CV: RR  Resp: NWOB  Abd: S/NT/ND  Ext: moving all extremities spontaneously and purposefully  Neuro: CN II-XII grossly intact    Labs:  WBC/Hgb/Hct/Plts:  7.11/10.7/34.1/248 (09/18 0310)  Na/K/Cl/CO2:  133/4.4/100/20 (09/18 0310)  BUN/Cr/glu/ALT/AST/amyl/lip:  57.6/9.32/--/--/--/--/-- (09/18 0310)     Assessment & Plan:   [facility] Piero Ly is a 63 y.o. male with unknown PMHx here with hyperparathyroidism associated renal failure and will likely need lifelong dialysis. Temporary line (placed 9/12) functioning well, will plan on tunneled line prior to discharge. HDS.    - cath lab 9/19 for tunneled line   - will discuss risks/benefits and consent today  - orders placed  - please don't hesitate to notify surgery of any changes    Dwight Cochran MD  [employer] General Surgery HO-1  7:06 AM

## 2023-09-18 NOTE — PROGRESS NOTES
09/18/23 1137        Hemodialysis Catheter 09/12/23 right internal jugular   Placement Date: 09/12/23   Present Prior to Hospital Arrival?: No  Hemodialysis Catheter Type: Temporary catheter  Location: right internal jugular  Placement Verification: X-ray   Line Necessity Review CRRT/HD   Site Assessment No drainage;No redness;No swelling;No warmth   Line Securement Device Secured with sutures   Dressing Type CHG impregnated dressing/sponge   Dressing Status Clean;Dry;Intact   Dressing Intervention Integrity maintained   Date on Dressing 09/13/23   Dressing Due to be Changed 09/20/23   Venous Patency/Care flushed w/o difficulty;blood return present;normal saline locked   Arterial Patency/Care flushed w/o difficulty;blood return present;normal saline locked   Waveform Not being transduced   Post-Hemodialysis Assessment   Blood Volume Processed (Liters) 64.5 L   Dialyzer Clearance Lightly streaked   Duration of Treatment 180 minutes   Additional Fluid Intake (mL) 500 mL   Total UF (mL) 1500 mL   Net Fluid Removal 1000   Patient Response to Treatment Tx completed, tolerated well, lines flushed and then packed with saline to filling volume with new end caps   Post-Treatment Weight 64.5 kg (142 lb 3.2 oz)   Treatment Weight Change 64.5   Post-Hemodialysis Comments no distress noted

## 2023-09-18 NOTE — PROGRESS NOTES
[facility] [place] [facility] Wards Progress Note     Resident Team: [facility] Medicine List 1  Attending Physician: Blanca Andres MD  Resident: Rodrigo Pelaez MD  Intern: Abner Waever MD     Subjective:      Brief HPI:  Ryann Ly is a 63 y.o. male with unknown PMH who presents to [facility] 2023 for episodes of shortness of breath occurring at night for the past week.  Patient states he has been having some productive cough with clearish-whitish sputum.  States he moved to the [facility] from [place] about 3 years ago, reports having been seen at an urgent care about a year ago and been started on some unspecified medication for an unspecified problem although has not been taking any medication for some time.  He does report having some reflux symptoms.  Patient denies any chest pain, N/V/D, abdominal pain, leg swelling, dizziness/headache, palpitations, fever, chills, urinary symptoms, hematuria, hemoptysis. Pt states he still makes adequate urine.  Denies any known sick contacts. Despite lab abnormalities patient appears very comfortable, is asking how long he will be staying      In the ED BP was 230/130, with trop elevated at 0.298, BNP 1656. Normal lactate and TSH. Negative flu/covid.    Interval History: No acute events overnight.  Afebrile, hemodynamically stable.  Blood pressure better today.  He is no acute concerns or complaints today.  Anticipate dialysis today.  Surgery to place tunneled catheter tomorrow.      Objective:     Last 24 Hour Vital Signs:  BP  Min: 127/70  Max: 176/95  Temp  Av.4 °F (36.9 °C)  Min: 98.1 °F (36.7 °C)  Max: 98.8 °F (37.1 °C)  Pulse  Av  Min: 67  Max: 72  Resp  Av  Min: 16  Max: 16  SpO2  Av.8 %  Min: 96 %  Max: 97 %  Weight  Av.1 kg (117 lb)  Min: 53.1 kg (117 lb)  Max: 53.1 kg (117 lb)  I/O last 3 completed shifts:  In: 812 [P.O.:812]  Out: 425 [Urine:425]    Physical Examination:  Physical Exam  Vitals reviewed.   Constitutional:       General: He is not in acute  distress.     Appearance: Normal appearance. He is normal weight. He is not ill-appearing.   HENT:      Head: Normocephalic and atraumatic.      Comments: RIJ catheter in place     Right Ear: External ear normal.      Left Ear: External ear normal.   Eyes:      General: No scleral icterus.     Extraocular Movements: Extraocular movements intact.      Conjunctiva/sclera: Conjunctivae normal.      Pupils: Pupils are equal, round, and reactive to light.   Cardiovascular:      Rate and Rhythm: Normal rate and regular rhythm.      Heart sounds: No murmur heard.     No friction rub. No gallop.   Pulmonary:      Effort: Pulmonary effort is normal.      Breath sounds: No wheezing, rhonchi or rales.   Abdominal:      General: Bowel sounds are normal. There is no distension.      Palpations: Abdomen is soft.   Musculoskeletal:         General: Normal range of motion.      Right lower leg: Edema (1+) present.      Left lower leg: Edema (1+) present.   Skin:     General: Skin is warm and dry.      Capillary Refill: Capillary refill takes less than 2 seconds.   Neurological:      General: No focal deficit present.      Mental Status: He is alert and oriented to person, place, and time. Mental status is at baseline.      Cranial Nerves: No cranial nerve deficit.         Laboratory:  Most Recent Data:  CBC:   Lab Results   Component Value Date    WBC 7.11 09/18/2023    HGB 10.7 (L) 09/18/2023    HCT 34.1 (L) 09/18/2023     09/18/2023    MCV 77.0 (L) 09/18/2023    RDW 14.9 09/18/2023     WBC Differential:   Recent Labs   Lab 09/14/23  0310 09/15/23  0312 09/16/23  0254 09/17/23  0412 09/18/23  0310   WBC 8.03 8.08 6.20 6.70 7.11   HGB 10.5* 10.8* 11.2* 10.7* 10.7*   HCT 32.9* 33.6* 35.3* 33.1* 34.1*    186 212 219 248   MCV 77.8* 77.6* 77.4* 77.5* 77.0*     BMP:   Lab Results   Component Value Date     (L) 09/18/2023    K 4.4 09/18/2023    CO2 20 (L) 09/18/2023    BUN 57.6 (H) 09/18/2023    CREATININE 9.32 (H)  09/18/2023    CALCIUM 8.0 (L) 09/18/2023    MG 2.00 09/14/2023    PHOS 6.6 (H) 09/17/2023     LFTs:   Lab Results   Component Value Date    ALBUMIN 2.3 (L) 09/15/2023    BILITOT 0.4 09/15/2023    AST 27 09/15/2023    ALKPHOS 47 09/15/2023    ALT 26 09/15/2023     Coags:   Lab Results   Component Value Date    INR 1.1 09/11/2023    PROTIME 13.5 09/11/2023    PTT 75.6 (H) 09/12/2023     FLP:   Lab Results   Component Value Date    CHOL 166 09/12/2023    HDL 43 09/12/2023    TRIG 47 09/12/2023     DM:   Lab Results   Component Value Date    HGBA1C 5.6 09/12/2023    CREATININE 9.32 (H) 09/18/2023     Thyroid:   Lab Results   Component Value Date    TSH 1.822 09/11/2023     Anemia:   Lab Results   Component Value Date    IRON 63 (L) 09/11/2023    TIBC 197 (L) 09/11/2023    FERRITIN 487.55 (H) 09/11/2023     Cardiac:   Lab Results   Component Value Date    TROPONINI 0.071 (H) 09/18/2023    BNP 1,656.2 (H) 09/11/2023     Urinalysis:   Lab Results   Component Value Date    PHUA 6.0 09/11/2023    UROBILINOGEN Normal 09/11/2023    WBCUA 0-5 09/11/2023       Radiology:  Imaging Results              US Doppler Renal Artery and Vein (xpd) (Final result)  Result time 09/12/23 12:27:06      Final result by Rodrigo Alegria MD (09/12/23 12:27:06)                   Impression:      1. Medical renal disease.  2. No hydronephrosis.  1.    Electronically signed by: Rodrigo Alegria  Date:    09/12/2023  Time:    12:27               Narrative:    EXAMINATION:  US DOPPLER RENAL ARTERY AND VEIN (XPD)    CLINICAL HISTORY:  hypertensive emergency;    COMPARISON:  11 September 2023    FINDINGS:  Grayscale, color and spectral Doppler sonographic evaluation of the kidneys and renal vasculature.    The right kidney measures 8.5 cm. The left kidney measures 8 cm.   No hydronephrosis.  There is increased renal parenchymal echogenicity.  There is a right renal cyst measuring 3-4 cm.  No follow-up is needed.    There are elevated resistive indices in  segmental renal arteries.  No significantly elevated velocities in the main renal arteries.  Renal veins are patent.                                       US Retroperitoneal Complete (Final result)  Result time 09/11/23 15:39:56      Final result by Malcom Castro MD (09/11/23 15:39:56)                   Narrative:    EXAMINATION  US RETROPERITONEAL COMPLETE    CLINICAL HISTORY  rule out hydronephrosis;    TECHNIQUE  Multiplanar grayscale sonographic images were obtained of the retroperitoneum and pelvis.    COMPARISON  None available at the time of initial interpretation.    FINDINGS  Exam quality: adequate for evaluation    Kidneys: The right kidney measures 8.2 cm x 3.4 cm x 4.4 cm, with the left kidney 8.1 cm x 3.9 cm x 3.9 cm.  The bilateral renal parenchyma is echogenic relative to the adjacent liver and spleen.  No solid mass-like lesion or complex cyst is identified.  A simple right lower pole cyst measures up to 3.1 cm in diameter, with additional simple cyst at the left upper renal cortex measuring 1.1 cm.  No collecting system dilatation.  No shadowing calcification is identified.  No perinephric collection or free abdominal fluid.    Bladder: No convincing intraluminal abnormality.  No wall thickening or focal mural lesion.    IMPRESSION  1. Increased echogenicity of the kidneys is nonspecific but suggest element of chronic medical renal disease.  2. Simple bilateral cortical cysts.  3. No evidence of acute urologic abnormality.      Electronically signed by: Malcom Castro  Date:    09/11/2023  Time:    15:39                                     CT Chest Without Contrast (Final result)  Result time 09/11/23 15:38:00      Final result by Malcom Castro MD (09/11/23 15:38:00)                   Narrative:    EXAMINATION  CT CHEST WITHOUT CONTRAST    CLINICAL HISTORY  possible new onset chf vs pneumonia;    TECHNIQUE  Non-contrast helical-acquisition CT images were obtained and multiplanar reformats  accomplished by a CT technologist at a separate workstation, pushed to PACS for physician review.    COMPARISON  Chest radiograph obtained earlier the same day.    FINDINGS  Images were reviewed in lung, soft tissue, and bone windows.    Exam quality: Limited secondary to patient movement throughout image acquisition, with resulting artifact.  Additionally, non-contrast protocol further limits overall diagnostic value.    Lines/tubes: none visualized    Heart chambers are prominent in size.  There is scattered vascular calcification, with no aneurysmal dilatation or other focal abnormality identified.  Hypoattenuation of the blood pool is nonspecific but suggest element of anemia.  There is no significant pericardial fluid.    Central airway patency is widely maintained.  Patchy bilateral multilobar areas of ground-glass airspace attenuation noted, with central predominance.  There are also small volume layering bilateral pleural effusions.  No loculated fluid component is identified.  There is no pneumothorax.    No convincing acute abnormality of the included upper abdominal solid organs.  Simple posterior right renal cortex cyst is incidentally identified.  The thyroid tissue is unremarkable.  No acute or destructive skeletal abnormality is identified.    IMPRESSION  1. Findings suggestive of central vascular congestion and developing pulmonary edema.  2. Small volume bilateral pleural effusions.  3. Nonspecific hypodensity of the blood pool, can be seen with anemia.    RADIATION DOSE  Automated tube current modulation, weight-based exposure dosing, and/or iterative reconstruction technique utilized to reach lowest reasonably achievable exposure rate.    DLP: 203 mGy*cm      Electronically signed by: Malcom Castro  Date:    09/11/2023  Time:    15:38                                     X-Ray Chest 1 View (Final result)  Result time 09/11/23 13:33:20      Final result by Malcom Castro MD (09/11/23 13:33:20)                    Narrative:    EXAMINATION  XR CHEST 1 VIEW    CLINICAL HISTORY  sob;    TECHNIQUE  A total of 1 frontal image(s) of the chest.    COMPARISON  None available at the time of initial interpretation.    FINDINGS  Lines/tubes/devices: none present    The cardiomediastinal silhouette and central pulmonary vasculature are unremarkable for utilized technique.  The trachea is midline.  Ill-defined right perihilar and basilar infiltrate noted, with no organized lobar consolidation identified.  Left lung field is clear.  There is no large pleural effusion or convincing pneumothorax.    There is no acute osseous or extrathoracic abnormality.    IMPRESSION  Ill-defined right perihilar/basilar infiltrate may represent developing atypical infectious process.      Electronically signed by: Malcom Castro  Date:    09/11/2023  Time:    13:33                                  Current Medications:     Infusions:       Scheduled:   amLODIPine  5 mg Oral QHS    aspirin  81 mg Oral Daily    calcitRIOL  0.25 mcg Oral Every other day    carvediloL  25 mg Oral BID    heparin (porcine)  5,000 Units Subcutaneous Q12H    hydrALAZINE  75 mg Oral TID    isosorbide dinitrate  20 mg Oral TID    pantoprazole  40 mg Oral QHS    sevelamer carbonate  800 mg Oral TID WM        PRN:  dextrose 10%, dextrose 10%, glucagon (human recombinant), glucose, glucose, hydrALAZINE, labetalol, naloxone, sodium chloride 0.9%  Assessment & Plan:     Acute renal failure  Secondary Hyperparathyroidism  -BUN//12 upon initial presentation  -dialysis per nephrology, currently on M/W/F schedule; will defer management of hyperparathyroidism to nephro  -per nephrology, will likely need lifelong dialysis  -plan for tunneled dialysis catheter placement tomorrow    Hypertension  -continue carvedilol 25 mg BID, hydralazine 75 TID, isosorbide dinitrate 20 mg TID, and amlodipine 5 mg QD (but switch timing tp nightly)     HFrEF  -LVEF 45% 09/11/2023  -Unable to  start ACE/ARB/ARNI, MRA, or SGLT2 given his renal failure  -continue carvedilol 25 mg BID and hydralazine/isosorbide dinitrate     Pulmonary Hypertension  - estimated pulmonary artery systolic pressure was 79 on TTE  - CT PE was negative for pulmonary embolus, only remarkable for pulmonary edema  - will need further workup with outpatient sleep study, and if negative, would benefit from right heart catheterization as outpatient    Disposition:  Continue inpatient management.  Continue dialysis per Nephrology.  Tunneled catheter to be placed tomorrow    Rodrigo Pelaez MD  [facility] Internal Medicine, PGY-3

## 2023-09-18 NOTE — PROGRESS NOTES
"Nephrology Progress Note    Hospital course:   Patient admitted to ICU for hypertensive emergency placed on nitro drip.  Cardiology consulted for NSTEMI with troponins 0.304, now downtrended.  Overnight, patient placed on Cardene drip due to uncontrolled blood pressure.  Renal indices did not improve with administration of sodium bicarb drip. Underwent first round of HD and downgraded from the ICU on 9/12/23.    Subjective:   No acute events overnight, patient no acute complaints this a.m..  Feeling well, currently on HD. somewhat hypotensive this a.m. with systolics in 170s.  Denies any nausea, vomiting, chest pain, shortness a breath, lower extremity edema.  Has no other complaints at this time.    I/Os +812 cc, -425 cc, +387 cc      ROS:  Otherwise negative    Objective:   BP (!) 176/95   Pulse 69   Temp 98.6 °F (37 °C) (Oral)   Resp 16   Ht 5' 2" (1.575 m)   Wt 53.1 kg (117 lb)   SpO2 97%   BMI 21.40 kg/m²     Intake/Output Summary (Last 24 hours) at 9/18/2023 1039  Last data filed at 9/18/2023 0937  Gross per 24 hour   Intake 1172 ml   Output 425 ml   Net 747 ml          Physical exam:   General appearance: alert, appears stated age, cooperative and no distress  Head: Normocephalic, without obvious abnormality, atraumatic  Neck: no adenopathy, no carotid bruit, no JVD and supple, symmetrical, trachea midline  Lungs: clear to auscultation bilaterally  Heart: regular rate and rhythm, S1, S2 normal, no murmur, click, rub or gallop  Abdomen: soft, non-tender; bowel sounds normal; no masses,  no organomegaly  Extremities: extremities normal, atraumatic, no cyanosis or edema  Pulses: 2+ and symmetric  Skin: Skin color, texture, turgor normal. No rashes or lesions  Neurologic: Grossly normal        Laboratory data:   Recent Results (from the past 24 hour(s))   Basic Metabolic Panel    Collection Time: 09/18/23  3:10 AM   Result Value Ref Range    Sodium Level 133 (L) 136 - 145 mmol/L    Potassium Level 4.4 3.5 " - 5.1 mmol/L    Chloride 100 98 - 107 mmol/L    Carbon Dioxide 20 (L) 23 - 31 mmol/L    Glucose Level 83 82 - 115 mg/dL    Blood Urea Nitrogen 57.6 (H) 8.4 - 25.7 mg/dL    Creatinine 9.32 (H) 0.73 - 1.18 mg/dL    BUN/Creatinine Ratio 6     Calcium Level Total 8.0 (L) 8.8 - 10.0 mg/dL    Anion Gap 13.0 mEq/L    eGFR 6 mls/min/1.73/m2   CBC with Differential    Collection Time: 09/18/23  3:10 AM   Result Value Ref Range    WBC 7.11 4.50 - 11.50 x10(3)/mcL    RBC 4.43 (L) 4.70 - 6.10 x10(6)/mcL    Hgb 10.7 (L) 14.0 - 18.0 g/dL    Hct 34.1 (L) 42.0 - 52.0 %    MCV 77.0 (L) 80.0 - 94.0 fL    MCH 24.2 (L) 27.0 - 31.0 pg    MCHC 31.4 (L) 33.0 - 36.0 g/dL    RDW 14.9 11.5 - 17.0 %    Platelet 248 130 - 400 x10(3)/mcL    MPV 9.8 7.4 - 10.4 fL    Neut % 61.2 %    Lymph % 21.9 %    Mono % 11.3 %    Eos % 4.6 %    Basophil % 0.6 %    Lymph # 1.56 0.6 - 4.6 x10(3)/mcL    Neut # 4.35 2.1 - 9.2 x10(3)/mcL    Mono # 0.80 0.1 - 1.3 x10(3)/mcL    Eos # 0.33 0 - 0.9 x10(3)/mcL    Baso # 0.04 <=0.2 x10(3)/mcL    IG# 0.03 0 - 0.04 x10(3)/mcL    IG% 0.4 %    NRBC% 0.0 %   Troponin I    Collection Time: 09/18/23  3:10 AM   Result Value Ref Range    Troponin-I 0.071 (H) 0.000 - 0.045 ng/mL       Imaging:   Imaging Results              US Doppler Renal Artery and Vein (xpd) (Final result)  Result time 09/12/23 12:27:06      Final result by Rodrigo Alegria MD (09/12/23 12:27:06)                   Impression:      1. Medical renal disease.  2. No hydronephrosis.  1.    Electronically signed by: Rodrigo Alegria  Date:    09/12/2023  Time:    12:27               Narrative:    EXAMINATION:  US DOPPLER RENAL ARTERY AND VEIN (XPD)    CLINICAL HISTORY:  hypertensive emergency;    COMPARISON:  11 September 2023    FINDINGS:  Grayscale, color and spectral Doppler sonographic evaluation of the kidneys and renal vasculature.    The right kidney measures 8.5 cm. The left kidney measures 8 cm.   No hydronephrosis.  There is increased renal parenchymal  echogenicity.  There is a right renal cyst measuring 3-4 cm.  No follow-up is needed.    There are elevated resistive indices in segmental renal arteries.  No significantly elevated velocities in the main renal arteries.  Renal veins are patent.                                       US Retroperitoneal Complete (Final result)  Result time 09/11/23 15:39:56      Final result by Malcom Castro MD (09/11/23 15:39:56)                   Narrative:    EXAMINATION  US RETROPERITONEAL COMPLETE    CLINICAL HISTORY  rule out hydronephrosis;    TECHNIQUE  Multiplanar grayscale sonographic images were obtained of the retroperitoneum and pelvis.    COMPARISON  None available at the time of initial interpretation.    FINDINGS  Exam quality: adequate for evaluation    Kidneys: The right kidney measures 8.2 cm x 3.4 cm x 4.4 cm, with the left kidney 8.1 cm x 3.9 cm x 3.9 cm.  The bilateral renal parenchyma is echogenic relative to the adjacent liver and spleen.  No solid mass-like lesion or complex cyst is identified.  A simple right lower pole cyst measures up to 3.1 cm in diameter, with additional simple cyst at the left upper renal cortex measuring 1.1 cm.  No collecting system dilatation.  No shadowing calcification is identified.  No perinephric collection or free abdominal fluid.    Bladder: No convincing intraluminal abnormality.  No wall thickening or focal mural lesion.    IMPRESSION  1. Increased echogenicity of the kidneys is nonspecific but suggest element of chronic medical renal disease.  2. Simple bilateral cortical cysts.  3. No evidence of acute urologic abnormality.      Electronically signed by: Malcom Castro  Date:    09/11/2023  Time:    15:39                                     CT Chest Without Contrast (Final result)  Result time 09/11/23 15:38:00      Final result by Malcom Castro MD (09/11/23 15:38:00)                   Narrative:    EXAMINATION  CT CHEST WITHOUT CONTRAST    CLINICAL HISTORY  possible  new onset chf vs pneumonia;    TECHNIQUE  Non-contrast helical-acquisition CT images were obtained and multiplanar reformats accomplished by a CT technologist at a separate workstation, pushed to PACS for physician review.    COMPARISON  Chest radiograph obtained earlier the same day.    FINDINGS  Images were reviewed in lung, soft tissue, and bone windows.    Exam quality: Limited secondary to patient movement throughout image acquisition, with resulting artifact.  Additionally, non-contrast protocol further limits overall diagnostic value.    Lines/tubes: none visualized    Heart chambers are prominent in size.  There is scattered vascular calcification, with no aneurysmal dilatation or other focal abnormality identified.  Hypoattenuation of the blood pool is nonspecific but suggest element of anemia.  There is no significant pericardial fluid.    Central airway patency is widely maintained.  Patchy bilateral multilobar areas of ground-glass airspace attenuation noted, with central predominance.  There are also small volume layering bilateral pleural effusions.  No loculated fluid component is identified.  There is no pneumothorax.    No convincing acute abnormality of the included upper abdominal solid organs.  Simple posterior right renal cortex cyst is incidentally identified.  The thyroid tissue is unremarkable.  No acute or destructive skeletal abnormality is identified.    IMPRESSION  1. Findings suggestive of central vascular congestion and developing pulmonary edema.  2. Small volume bilateral pleural effusions.  3. Nonspecific hypodensity of the blood pool, can be seen with anemia.    RADIATION DOSE  Automated tube current modulation, weight-based exposure dosing, and/or iterative reconstruction technique utilized to reach lowest reasonably achievable exposure rate.    DLP: 203 mGy*cm      Electronically signed by: Malcom Castro  Date:    09/11/2023  Time:    15:38                                     X-Ray  Chest 1 View (Final result)  Result time 09/11/23 13:33:20      Final result by Malcom Castro MD (09/11/23 13:33:20)                   Narrative:    EXAMINATION  XR CHEST 1 VIEW    CLINICAL HISTORY  sob;    TECHNIQUE  A total of 1 frontal image(s) of the chest.    COMPARISON  None available at the time of initial interpretation.    FINDINGS  Lines/tubes/devices: none present    The cardiomediastinal silhouette and central pulmonary vasculature are unremarkable for utilized technique.  The trachea is midline.  Ill-defined right perihilar and basilar infiltrate noted, with no organized lobar consolidation identified.  Left lung field is clear.  There is no large pleural effusion or convincing pneumothorax.    There is no acute osseous or extrathoracic abnormality.    IMPRESSION  Ill-defined right perihilar/basilar infiltrate may represent developing atypical infectious process.      Electronically signed by: Malcom Castro  Date:    09/11/2023  Time:    13:33                                     Medications:     Current Facility-Administered Medications:     amLODIPine tablet 5 mg, 5 mg, Oral, QHS, Abner Weaver MD    aspirin chewable tablet 81 mg, 81 mg, Oral, Daily, Stroda, Austin, DO, 81 mg at 09/18/23 0836    calcitRIOL capsule 0.25 mcg, 0.25 mcg, Oral, Every other day, Breanna Webster FNP, 0.25 mcg at 09/18/23 0837    carvediloL tablet 25 mg, 25 mg, Oral, BID, Rodrigo Pelaez MD, 25 mg at 09/18/23 0837    dextrose 10% bolus 125 mL 125 mL, 12.5 g, Intravenous, PRN, Stroda, Austin, DO    dextrose 10% bolus 250 mL 250 mL, 25 g, Intravenous, PRN, Stroda, Austin, DO    glucagon (human recombinant) injection 1 mg, 1 mg, Intramuscular, PRN, Stroda, Austin, DO    glucose chewable tablet 16 g, 16 g, Oral, PRN, Stroda, Austin, DO    glucose chewable tablet 24 g, 24 g, Oral, PRN, Stroda, Austin, DO    heparin (porcine) injection 5,000 Units, 5,000 Units, Subcutaneous, Q12H, Rodrigo Pelaez MD, 5,000 Units at 09/18/23 0837    hydrALAZINE  injection 10 mg, 10 mg, Intravenous, Q2H PRN, Rodrigo Pelaez MD, 10 mg at 09/15/23 0558    hydrALAZINE tablet 75 mg, 75 mg, Oral, TID, Abner Weaver MD, 75 mg at 09/18/23 0836    isosorbide dinitrate tablet 20 mg, 20 mg, Oral, TID, Abner Weaver MD, 20 mg at 09/18/23 0837    labetalol 20 mg/4 mL (5 mg/mL) IV syring, 10 mg, Intravenous, Q2H PRN, Rodrigo Pelaez MD, 10 mg at 09/13/23 0417    naloxone 0.4 mg/mL injection 0.02 mg, 0.02 mg, Intravenous, PRN, Ausitn Giraldo, DO    pantoprazole EC tablet 40 mg, 40 mg, Oral, QHS, Silver Giraldole, DO, 40 mg at 09/17/23 2024    sevelamer carbonate tablet 800 mg, 800 mg, Oral, TID WM, Brittany Hyman MD    sodium chloride 0.9% flush 10 mL, 10 mL, Intravenous, Q12H PRN, Silver Giraldole, DO     Impression/Plan   Nonoliguric Acute renal failure vs ESRD  metabolic acidosis  Proteinuria  -etiology of ESRD currently unclear (hypertensive nephropathy versus FSGS) would benefit from renal biopsy after discharge   -continue with HD on MWF basis while inpatient   -planning for tunnel cath with General surgery on 09/19/2023, appreciate their assistance    3. Hypertensive emergency  4. Congestive Heart Failure  5. NSTEMI  -Rest of management per primary team    Yolie Kwan DO  [facility], -3  09/18/2023      Attending Addendum to follow:

## 2023-09-19 LAB
ALBUMIN SERPL-MCNC: 2.5 G/DL (ref 3.4–4.8)
ALBUMIN/GLOB SERPL: 0.7 RATIO (ref 1.1–2)
ALP SERPL-CCNC: 54 UNIT/L (ref 40–150)
ALT SERPL-CCNC: 31 UNIT/L (ref 0–55)
AST SERPL-CCNC: 24 UNIT/L (ref 5–34)
BASOPHILS # BLD AUTO: 0.04 X10(3)/MCL
BASOPHILS NFR BLD AUTO: 0.6 %
BILIRUB SERPL-MCNC: 0.3 MG/DL
BUN SERPL-MCNC: 35.5 MG/DL (ref 8.4–25.7)
CALCIUM SERPL-MCNC: 8.1 MG/DL (ref 8.8–10)
CHLORIDE SERPL-SCNC: 100 MMOL/L (ref 98–107)
CO2 SERPL-SCNC: 23 MMOL/L (ref 23–31)
CREAT SERPL-MCNC: 6.36 MG/DL (ref 0.73–1.18)
EOSINOPHIL # BLD AUTO: 0.26 X10(3)/MCL (ref 0–0.9)
EOSINOPHIL NFR BLD AUTO: 3.6 %
ERYTHROCYTE [DISTWIDTH] IN BLOOD BY AUTOMATED COUNT: 14.9 % (ref 11.5–17)
GFR SERPLBLD CREATININE-BSD FMLA CKD-EPI: 9 MLS/MIN/1.73/M2
GLOBULIN SER-MCNC: 3.4 GM/DL (ref 2.4–3.5)
GLUCOSE SERPL-MCNC: 93 MG/DL (ref 82–115)
HCT VFR BLD AUTO: 34.9 % (ref 42–52)
HGB BLD-MCNC: 10.9 G/DL (ref 14–18)
IMM GRANULOCYTES # BLD AUTO: 0.02 X10(3)/MCL (ref 0–0.04)
IMM GRANULOCYTES NFR BLD AUTO: 0.3 %
KAPPA LC FREE SER-MCNC: 11.7 MG/DL (ref 0.33–1.94)
KAPPA LC FREE/LAMBDA FREE SER: 1.05 {RATIO} (ref 0.26–1.65)
LAMBDA LC FREE SERPL-MCNC: 11.1 MG/DL (ref 0.57–2.63)
LYMPHOCYTES # BLD AUTO: 1.63 X10(3)/MCL (ref 0.6–4.6)
LYMPHOCYTES NFR BLD AUTO: 22.8 %
MAGNESIUM SERPL-MCNC: 2 MG/DL (ref 1.6–2.6)
MCH RBC QN AUTO: 24.3 PG (ref 27–31)
MCHC RBC AUTO-ENTMCNC: 31.2 G/DL (ref 33–36)
MCV RBC AUTO: 77.7 FL (ref 80–94)
MONOCYTES # BLD AUTO: 0.87 X10(3)/MCL (ref 0.1–1.3)
MONOCYTES NFR BLD AUTO: 12.2 %
NEUTROPHILS # BLD AUTO: 4.34 X10(3)/MCL (ref 2.1–9.2)
NEUTROPHILS NFR BLD AUTO: 60.5 %
NRBC BLD AUTO-RTO: 0 %
PHOSPHATE SERPL-MCNC: 5.3 MG/DL (ref 2.3–4.7)
PLATELET # BLD AUTO: 270 X10(3)/MCL (ref 130–400)
PMV BLD AUTO: 9.1 FL (ref 7.4–10.4)
POTASSIUM SERPL-SCNC: 4.1 MMOL/L (ref 3.5–5.1)
PROT SERPL-MCNC: 5.9 GM/DL (ref 5.8–7.6)
RBC # BLD AUTO: 4.49 X10(6)/MCL (ref 4.7–6.1)
SODIUM SERPL-SCNC: 135 MMOL/L (ref 136–145)
WBC # SPEC AUTO: 7.16 X10(3)/MCL (ref 4.5–11.5)

## 2023-09-19 PROCEDURE — 25000003 PHARM REV CODE 250: Performed by: SURGERY

## 2023-09-19 PROCEDURE — 90935 HEMODIALYSIS ONE EVALUATION: CPT

## 2023-09-19 PROCEDURE — 83735 ASSAY OF MAGNESIUM: CPT | Performed by: INTERNAL MEDICINE

## 2023-09-19 PROCEDURE — 84100 ASSAY OF PHOSPHORUS: CPT | Performed by: INTERNAL MEDICINE

## 2023-09-19 PROCEDURE — 99152 MOD SED SAME PHYS/QHP 5/>YRS: CPT | Performed by: SURGERY

## 2023-09-19 PROCEDURE — 63600175 PHARM REV CODE 636 W HCPCS: Performed by: SURGERY

## 2023-09-19 PROCEDURE — 36558 INSERT TUNNELED CV CATH: CPT | Performed by: SURGERY

## 2023-09-19 PROCEDURE — C1769 GUIDE WIRE: HCPCS | Performed by: SURGERY

## 2023-09-19 PROCEDURE — 25000003 PHARM REV CODE 250: Performed by: INTERNAL MEDICINE

## 2023-09-19 PROCEDURE — 80053 COMPREHEN METABOLIC PANEL: CPT | Performed by: INTERNAL MEDICINE

## 2023-09-19 PROCEDURE — 85025 COMPLETE CBC W/AUTO DIFF WBC: CPT | Performed by: STUDENT IN AN ORGANIZED HEALTH CARE EDUCATION/TRAINING PROGRAM

## 2023-09-19 PROCEDURE — 63600175 PHARM REV CODE 636 W HCPCS: Performed by: STUDENT IN AN ORGANIZED HEALTH CARE EDUCATION/TRAINING PROGRAM

## 2023-09-19 PROCEDURE — C1750 CATH, HEMODIALYSIS,LONG-TERM: HCPCS | Performed by: SURGERY

## 2023-09-19 PROCEDURE — 25000003 PHARM REV CODE 250: Performed by: STUDENT IN AN ORGANIZED HEALTH CARE EDUCATION/TRAINING PROGRAM

## 2023-09-19 PROCEDURE — 25000003 PHARM REV CODE 250

## 2023-09-19 PROCEDURE — 99153 MOD SED SAME PHYS/QHP EA: CPT | Performed by: SURGERY

## 2023-09-19 PROCEDURE — 21400001 HC TELEMETRY ROOM

## 2023-09-19 DEVICE — PRECISION CHRONIC CATHETER KIT,SYMMETRICAL TIP, PRE-CURVED SHAFT AND TAL VENATRAC STYLET,14.5 FR/CH (4.8 MM) X 23 CM
Type: IMPLANTABLE DEVICE | Site: NECK | Status: FUNCTIONAL
Brand: PALINDROME

## 2023-09-19 RX ORDER — CEFAZOLIN SODIUM 2 G/50ML
2 SOLUTION INTRAVENOUS ONCE
Status: DISCONTINUED | OUTPATIENT
Start: 2023-09-19 | End: 2023-09-20 | Stop reason: HOSPADM

## 2023-09-19 RX ORDER — MIDAZOLAM HYDROCHLORIDE 1 MG/ML
INJECTION INTRAMUSCULAR; INTRAVENOUS
Status: DISCONTINUED | OUTPATIENT
Start: 2023-09-19 | End: 2023-09-19 | Stop reason: HOSPADM

## 2023-09-19 RX ORDER — HYDRALAZINE HYDROCHLORIDE 20 MG/ML
INJECTION INTRAMUSCULAR; INTRAVENOUS
Status: DISCONTINUED | OUTPATIENT
Start: 2023-09-19 | End: 2023-09-19 | Stop reason: HOSPADM

## 2023-09-19 RX ORDER — DIPHENHYDRAMINE HYDROCHLORIDE 50 MG/ML
INJECTION INTRAMUSCULAR; INTRAVENOUS
Status: DISCONTINUED | OUTPATIENT
Start: 2023-09-19 | End: 2023-09-19 | Stop reason: HOSPADM

## 2023-09-19 RX ORDER — FENTANYL CITRATE 50 UG/ML
INJECTION, SOLUTION INTRAMUSCULAR; INTRAVENOUS
Status: DISCONTINUED | OUTPATIENT
Start: 2023-09-19 | End: 2023-09-19 | Stop reason: HOSPADM

## 2023-09-19 RX ORDER — HEPARIN SODIUM 5000 [USP'U]/ML
INJECTION, SOLUTION INTRAVENOUS; SUBCUTANEOUS
Status: DISCONTINUED | OUTPATIENT
Start: 2023-09-19 | End: 2023-09-19 | Stop reason: HOSPADM

## 2023-09-19 RX ORDER — LIDOCAINE HYDROCHLORIDE 10 MG/ML
INJECTION INFILTRATION; PERINEURAL
Status: DISCONTINUED | OUTPATIENT
Start: 2023-09-19 | End: 2023-09-19 | Stop reason: HOSPADM

## 2023-09-19 RX ADMIN — CARVEDILOL 25 MG: 12.5 TABLET, FILM COATED ORAL at 09:09

## 2023-09-19 RX ADMIN — HYDRALAZINE HYDROCHLORIDE 75 MG: 50 TABLET ORAL at 09:09

## 2023-09-19 RX ADMIN — HEPARIN SODIUM 5000 UNITS: 5000 INJECTION, SOLUTION INTRAVENOUS; SUBCUTANEOUS at 01:09

## 2023-09-19 RX ADMIN — ASPIRIN 81 MG CHEWABLE TABLET 81 MG: 81 TABLET CHEWABLE at 01:09

## 2023-09-19 RX ADMIN — ISOSORBIDE DINITRATE 20 MG: 20 TABLET ORAL at 08:09

## 2023-09-19 RX ADMIN — AMLODIPINE BESYLATE 5 MG: 5 TABLET ORAL at 08:09

## 2023-09-19 RX ADMIN — HYDRALAZINE HYDROCHLORIDE 75 MG: 50 TABLET ORAL at 08:09

## 2023-09-19 RX ADMIN — CARVEDILOL 25 MG: 12.5 TABLET, FILM COATED ORAL at 08:09

## 2023-09-19 RX ADMIN — SEVELAMER CARBONATE 800 MG: 800 TABLET, FILM COATED ORAL at 12:09

## 2023-09-19 RX ADMIN — LABETALOL HYDROCHLORIDE 10 MG: 5 INJECTION, SOLUTION INTRAVENOUS at 03:09

## 2023-09-19 RX ADMIN — PANTOPRAZOLE SODIUM 40 MG: 40 TABLET, DELAYED RELEASE ORAL at 08:09

## 2023-09-19 RX ADMIN — HEPARIN SODIUM 5000 UNITS: 5000 INJECTION, SOLUTION INTRAVENOUS; SUBCUTANEOUS at 08:09

## 2023-09-19 RX ADMIN — ISOSORBIDE DINITRATE 20 MG: 20 TABLET ORAL at 09:09

## 2023-09-19 NOTE — PROGRESS NOTES
Inpatient Nutrition Assessment    Admit Date: 9/11/2023   Total duration of encounter: 8 days     Nutrition Recommendation/Prescription     Continue renal diet--HD  Order novasource renal bid; Novasource Renal (provides 475 kcal, 22 g protein per serving)   Continue phosphate binder /elevated P level  MVI/fe  Biweekly wt  Will monitor nutrition status     Communication of Recommendations: reviewed with nurse    Nutrition Assessment     Malnutrition Assessment/Nutrition-Focused Physical Exam    Malnutrition Context: acute illness or injury  Malnutrition Level:  (pt does not meet malnutrition criteria)                    A minimum of two characteristics is recommended for diagnosis of either severe or non-severe malnutrition.    Chart Review    Reason Seen: continuous nutrition monitoring and follow-up    Malnutrition Screening Tool Results   Have you recently lost weight without trying?: No  Have you been eating poorly because of a decreased appetite?: No   MST Score: 0     Diagnosis:  Acute renal failure, metabolic acidosis, HTN, Chf, NSTEMI ; nephrotic syndrome ; (9/19) tunnel cath HD placement     Relevant Medical History: none    Nutrition-Related Medications:  aspirin, pantoprazole; sevelamer carbonate   Calorie Containing IV Medications: no significant kcals from medications at this time    Nutrition-Related Labs:  (9/14) H/H 10.5/32.9(L) Gluc 92 bun 26.6(H) Cr 5.5(H) GFR 11 P 5.4(H) K 4.9   (9/19) H/H 10.9/34.9(L) Gluc 93 Bun 35 Cr 6.3(H) GFR 9(L) K 4.1 Alb 2.5(L)     Diet/PN Order: DIET RENAL ON DIALYSIS  Oral Supplement Order: none  Tube Feeding Order: none  Appetite/Oral Intake: good/% of meals  Factors Affecting Nutritional Intake: none  Food/Confucianism/Cultural Preferences: none reported  Food Allergies: none reported    Skin Integrity: intact  Wound(s):   none     Comments  (9/19)Pt was in surgery during rounds/ having tunnel cath placed for HD; nurse reported pt has been doing ok; no N/V;  "difficulty with eating. Abnormal labs noted reflective renal dz. Current diet appropriate.     () Pt reported eating ok; no N/V; good appetite; wt elevated 53.3kg. Nephrology team treating renal failure; pt remains on HD. Current renal diet appropriate; will order oral supplement.     () Pt having ultrasound during rounds; spoke to nurse caring for pt--pt to have multiple test today. Pt to have HD access placed; agreed to dialysis. Unable to obtain diet/wt hx at this time. Suggest renal diet/ oral supplement when able to advance diet. Abnormal labs noted: Bun/Cr (H)--reflective renal dysfunction. P (H)--suggest phosphate binder.     Anthropometrics    Height: 5' 2" (157.5 cm)    Last Weight: 52.4 kg (115 lb 8 oz) (23 0631) Weight Method: Bed Scale  BMI (Calculated): 21.1  BMI Classification: normal (BMI 18.5-24.9)        Ideal Body Weight (IBW), Male: 118 lb     % Ideal Body Weight, Male (lb): 99.96 %                 Usual Body Weight (UBW), kg:  (unable to obtain)        Usual Weight Provided By: EMR weight history    Wt Readings from Last 5 Encounters:   23 52.4 kg (115 lb 8 oz)     Weight Change(s) Since Admission:  Admit Weight: 51.7 kg (113 lb 15.7 oz) (23 1117)      Estimated Needs    Weight Used For Calorie Calculations: 51.2 kg (112 lb 14 oz)  Energy Calorie Requirements (kcal): 1536 kcal/d; 30 coby/kg  Energy Need Method: Kcal/kg  Weight Used For Protein Calculations: 51.2 kg (112 lb 14 oz)  Protein Requirements: 61 gm protein/d; 1.2 gm/kg ARF/ to begin HD  Fluid Requirements (mL): 1536 ml/d; 1ml/coby  Temp (24hrs), Av.2 °F (36.8 °C), Min:97.6 °F (36.4 °C), Max:98.8 °F (37.1 °C)       Enteral Nutrition    Patient not receiving enteral nutrition at this time.    Parenteral Nutrition    Patient not receiving parenteral nutrition support at this time.    Evaluation of Received Nutrient Intake    Calories: meeting estimated needs  Protein: meeting estimated needs    Patient " Education    Not applicable.    Nutrition Diagnosis     PES: Inadequate oral intake related to acute illness as evidenced by NPO. (resolved)    Interventions/Goals     Intervention(s): modified composition of meals/snacks, commercial beverage, multivitamin/mineral supplement therapy, and collaboration with other providers  Goal: Meet greater than 75% of nutritional needs by follow-up. (goal progressing)    Monitoring & Evaluation     Dietitian will monitor food and beverage intake, weight, and electrolyte/renal panel.  Nutrition Risk/Follow-Up: moderate (follow-up in 3-5 days)   Please consult if re-assessment needed sooner.

## 2023-09-19 NOTE — PROGRESS NOTES
09/19/23 1056   Sedation Risk Screen   NPO Since: Date 09/19/23   NPO Since: Time 0000   Mallampati Scale Class II   ASA Classification Class 3   Sedation History and Plan   Previous Sedation History None   Consent Signed Yes   Sedation Plan Nurse Monitoring: Moderate

## 2023-09-19 NOTE — OP NOTE
Operative Note    Patient Name: Ryann Ly  Age: 63 y.o.  Sex: male  YOB: 1960  MRN: [id number]  Author: Dwight Cochran  Location: [facility] Cath Lab    Date: 9/19/2023    Pre Op Dx: ESRD (end stage renal disease) on dialysis [N18.6, Z99.2]    Post Op Dx:  ESRD (end stage renal disease) on dialysis [N18.6, Z99.2]     Procedure performed:  RIJ tunneled catheter insertion via same access under fluoroscopy     Surgeon:  Theodore Benitez MD - Surgeon   Dwight Cochran MD - first assist     Assistant: None    Anesthesia Type:  Local and Moderate Sedation     Indications:  ESRD long term    Findings:    Tunneled cathter in good position, no kinks  Flushes and draws back under high pressure prior to completion of procedure     Procedure in detail:    The neck was prepped and draped in the usual sterile technique and the catheter was also prepped into the fields. Local anesthetic was administered in the deltopectoral grove to the neck. The skin nick was made with an 11 blade in the deltopectoral grove.  The catheter was then tunneled from the incision in the deltopectoral groove to the neck incision. A wire was advanced into the old catheter and it was removed. Dilators were used to dilate the track under fluoroscopy. A peel away sheath was then advanced under fluoroscopy into the SVC.The catheter was then advanced into the peel away sheath and the sheath was removed. The tip of the catheter terminated in the superior vena cava/atrial junction. Both lumens of the catheter flushed and aspirated easily.  It was flushed with heparinized saline and instilled with a 1000 unit/cc solution. The catheter was sutured to the skin with a nylon suture.  The neck incision was closed with a Monocryl suture.  Sterile dressing was applied.      Notes: The catheter can be use for hemodialysis.     Dwight Cochran MD  [facility] General Surgery HO-1  11:35 AM      I was personally responsible for the administration of moderate  sedation services during the procedure performed and I affirm all the guidelines and requirements described in the CPT 2017 section on moderate sedation were followed, including the use of an independently trained observer. The total face-to-face time was 35 minutes.    Theodore Benitez MD

## 2023-09-19 NOTE — PROGRESS NOTES
"Acute Care and General Surgery   Progress Note  Admit Date: 9/11/2023  HD#8  POD#* No surgery date entered *    Subjective:   Interval history:  AF HDS  No acute complaints  HD yesterday via RIJ, 1L fluid taken off    Home Meds:No current outpatient medications   Scheduled Meds:   amLODIPine  5 mg Oral QHS    aspirin  81 mg Oral Daily    calcitRIOL  0.25 mcg Oral Every other day    carvediloL  25 mg Oral BID    heparin (porcine)  5,000 Units Subcutaneous Q12H    hydrALAZINE  75 mg Oral TID    isosorbide dinitrate  20 mg Oral TID    pantoprazole  40 mg Oral QHS    sevelamer carbonate  800 mg Oral TID WM     Continuous Infusions:  PRN Meds:dextrose 10%, dextrose 10%, glucagon (human recombinant), glucose, glucose, hydrALAZINE, labetalol, naloxone, sodium chloride 0.9%     Objective:     VITAL SIGNS: 24 HR MIN & MAX LAST   Temp  Min: 97.6 °F (36.4 °C)  Max: 98.8 °F (37.1 °C)  98.8 °F (37.1 °C)   BP  Min: 132/70  Max: 176/95  (!) 170/88    Pulse  Min: 69  Max: 77  77    Resp  Min: 20  Max: 20  20    SpO2  Min: 96 %  Max: 98 %  96 %      HT: 5' 2" (157.5 cm)  WT: 52.4 kg (115 lb 8 oz)  BMI: 21.1     Intake/output:  Intake/Output - Last 3 Shifts         09/17 0700 09/18 0659 09/18 0700 09/19 0659    P.O. 812 1120    Other  500    Total Intake(mL/kg) 812 (15.3) 1620 (30.9)    Urine (mL/kg/hr) 425 (0.3) 0 (0)    Other  1500    Total Output 425 1500    Net +387 +120          Urine Occurrence 3 x 4 x    Stool Occurrence 1 x 0 x            Intake/Output Summary (Last 24 hours) at 9/19/2023 0642  Last data filed at 9/19/2023 0626  Gross per 24 hour   Intake 1620 ml   Output 1500 ml   Net 120 ml           Lines/drains/airway:       Hemodialysis Catheter 09/12/23 right internal jugular (Active)   Line Necessity Review CRRT/HD 09/17/23 0800   Verification by X-ray Yes 09/16/23 0800   Site Assessment No swelling;No redness 09/17/23 2000   Line Securement Device Secured with sutures 09/17/23 0800   Dressing Type Central line " dressing 09/17/23 1800   Dressing Status Clean;Dry;Intact;Old drainage 09/17/23 1800   Dressing Intervention Integrity maintained 09/17/23 1800   Date on Dressing 09/13/23 09/15/23 1122   Dressing Due to be Changed 09/20/23 09/16/23 2000   Venous Patency/Care flushed w/o difficulty;blood return present;normal saline locked 09/15/23 1122   Arterial Patency/Care flushed w/o difficulty;blood return present;normal saline locked 09/15/23 1122   Waveform Not being transduced 09/15/23 1122   Number of days: 6            Peripheral IV - Single Lumen 09/11/23 1608 20 G Left Antecubital (Active)   Site Assessment Clean;Dry;Intact;No redness;No swelling 09/18/23 0600   Extremity Assessment Distal to IV No abnormal discoloration;No redness;No swelling;No warmth 09/17/23 1800   Line Status Flushed;Saline locked 09/17/23 1800   Dressing Status Clean;Dry;Intact 09/17/23 1800   Dressing Intervention Integrity maintained 09/17/23 1800   Number of days: 6     Physical examination:  Gen: NAD, AAOx3, answering questions appropriately  HEENT: RIJ temporary line in place   CV: RR  Resp: NWOB  Abd: S/NT/ND  Ext: moving all extremities spontaneously and purposefully  Neuro: CN II-XII grossly intact    Labs:  WBC/Hgb/Hct/Plts:  7.16/10.9/34.9/270 (09/19 0316)  Na/K/Cl/CO2:  135/4.1/100/23 (09/19 0316)  BUN/Cr/glu/ALT/AST/amyl/lip:  35.5/6.36/--/31/24/--/-- (09/19 0316)     Assessment & Plan:   [facility] Piero Ly is a 63 y.o. male with unknown PMHx here with hyperparathyroidism associated renal failure and will likely need lifelong dialysis. Temporary line (placed 9/12) functioning well, will plan on tunneled line prior to discharge. HDS.    - cath lab today for tunneled line   - discussed risks/benefits and consented today   - if no issues during procedure, would be OK to discharge afterward   - please don't hesitate to notify surgery of any changes  - rest of care per primary    Dwight Cochran MD  [facility] General Surgery HO-1  6:45 AM

## 2023-09-19 NOTE — PROGRESS NOTES
hospitals Internal Medicine Wards Progress Note     Resident Team: [facility] Medicine List 1  Attending Physician: Blanca Andres MD  Resident: Rodrigo Pelaez MD  Intern: Abner Weaver MD     Subjective:      Brief HPI:  Ryann Ly is a 63 y.o. male with unknown PMH who presents to [facility] 2023 for episodes of shortness of breath occurring at night for the past week.  Patient states he has been having some productive cough with clearish-whitish sputum.  States he moved to the [facility] from [place] about 3 years ago, reports having been seen at an urgent care about a year ago and been started on some unspecified medication for an unspecified problem although has not been taking any medication for some time.  He does report having some reflux symptoms.  Patient denies any chest pain, N/V/D, abdominal pain, leg swelling, dizziness/headache, palpitations, fever, chills, urinary symptoms, hematuria, hemoptysis. Pt states he still makes adequate urine.  Denies any known sick contacts. Despite lab abnormalities patient appears very comfortable, is asking how long he will be staying      In the ED BP was 230/130, with trop elevated at 0.298, BNP 1656. Normal lactate and TSH. Negative flu/covid.    Interval History: No acute events overnight.  Afebrile, hemodynamically stable. Still hypertensive this morning, BP fluctuating. No acute concerns or complaints today. Tunneled HD line placed by surgery team today.      Objective:     Last 24 Hour Vital Signs:  BP  Min: 132/70  Max: 171/87  Temp  Av.2 °F (36.8 °C)  Min: 97.6 °F (36.4 °C)  Max: 98.8 °F (37.1 °C)  Pulse  Av.6  Min: 72  Max: 77  Resp  Av  Min: 20  Max: 20  SpO2  Av.2 %  Min: 96 %  Max: 98 %  Weight  Av.4 kg (115 lb 8 oz)  Min: 52.4 kg (115 lb 8 oz)  Max: 52.4 kg (115 lb 8 oz)  I/O last 3 completed shifts:  In: 1620 [P.O.:1120; Other:500]  Out:  [Urine:425; Other:1500]    Physical Examination:  Physical Exam  Vitals reviewed.   Constitutional:        General: He is not in acute distress.     Appearance: Normal appearance. He is normal weight. He is not ill-appearing.   HENT:      Head: Normocephalic and atraumatic.      Comments: RIJ catheter in place     Right Ear: External ear normal.      Left Ear: External ear normal.   Eyes:      General: No scleral icterus.     Extraocular Movements: Extraocular movements intact.      Conjunctiva/sclera: Conjunctivae normal.      Pupils: Pupils are equal, round, and reactive to light.   Cardiovascular:      Rate and Rhythm: Normal rate and regular rhythm.      Heart sounds: No murmur heard.     No friction rub. No gallop.   Pulmonary:      Effort: Pulmonary effort is normal.      Breath sounds: No wheezing, rhonchi or rales.   Abdominal:      General: Bowel sounds are normal. There is no distension.      Palpations: Abdomen is soft.   Musculoskeletal:         General: Normal range of motion.      Right lower leg: Edema (1+) present.      Left lower leg: Edema (1+) present.   Skin:     General: Skin is warm and dry.      Capillary Refill: Capillary refill takes less than 2 seconds.   Neurological:      General: No focal deficit present.      Mental Status: He is alert and oriented to person, place, and time. Mental status is at baseline.      Cranial Nerves: No cranial nerve deficit.         Laboratory:  Most Recent Data:  CBC:   Lab Results   Component Value Date    WBC 7.16 09/19/2023    HGB 10.9 (L) 09/19/2023    HCT 34.9 (L) 09/19/2023     09/19/2023    MCV 77.7 (L) 09/19/2023    RDW 14.9 09/19/2023     WBC Differential:   Recent Labs   Lab 09/15/23  0312 09/16/23  0254 09/17/23  0412 09/18/23  0310 09/19/23  0316   WBC 8.08 6.20 6.70 7.11 7.16   HGB 10.8* 11.2* 10.7* 10.7* 10.9*   HCT 33.6* 35.3* 33.1* 34.1* 34.9*    212 219 248 270   MCV 77.6* 77.4* 77.5* 77.0* 77.7*       BMP:   Lab Results   Component Value Date     (L) 09/19/2023    K 4.1 09/19/2023    CO2 23 09/19/2023    BUN 35.5 (H)  09/19/2023    CREATININE 6.36 (H) 09/19/2023    CALCIUM 8.1 (L) 09/19/2023    MG 2.00 09/19/2023    PHOS 5.3 (H) 09/19/2023     LFTs:   Lab Results   Component Value Date    ALBUMIN 2.5 (L) 09/19/2023    BILITOT 0.3 09/19/2023    AST 24 09/19/2023    ALKPHOS 54 09/19/2023    ALT 31 09/19/2023     Coags:   Lab Results   Component Value Date    INR 1.1 09/11/2023    PROTIME 13.5 09/11/2023    PTT 75.6 (H) 09/12/2023     FLP:   Lab Results   Component Value Date    CHOL 166 09/12/2023    HDL 43 09/12/2023    TRIG 47 09/12/2023     DM:   Lab Results   Component Value Date    HGBA1C 5.6 09/12/2023    CREATININE 6.36 (H) 09/19/2023     Thyroid:   Lab Results   Component Value Date    TSH 1.822 09/11/2023     Anemia:   Lab Results   Component Value Date    IRON 63 (L) 09/11/2023    TIBC 197 (L) 09/11/2023    FERRITIN 487.55 (H) 09/11/2023     Cardiac:   Lab Results   Component Value Date    TROPONINI 0.071 (H) 09/18/2023    BNP 1,656.2 (H) 09/11/2023     Urinalysis:   Lab Results   Component Value Date    PHUA 6.0 09/11/2023    UROBILINOGEN Normal 09/11/2023    WBCUA 0-5 09/11/2023       Radiology:  Imaging Results              US Doppler Renal Artery and Vein (xpd) (Final result)  Result time 09/12/23 12:27:06      Final result by Rodrigo Alegria MD (09/12/23 12:27:06)                   Impression:      1. Medical renal disease.  2. No hydronephrosis.  1.    Electronically signed by: Rodrigo Alegria  Date:    09/12/2023  Time:    12:27               Narrative:    EXAMINATION:  US DOPPLER RENAL ARTERY AND VEIN (XPD)    CLINICAL HISTORY:  hypertensive emergency;    COMPARISON:  11 September 2023    FINDINGS:  Grayscale, color and spectral Doppler sonographic evaluation of the kidneys and renal vasculature.    The right kidney measures 8.5 cm. The left kidney measures 8 cm.   No hydronephrosis.  There is increased renal parenchymal echogenicity.  There is a right renal cyst measuring 3-4 cm.  No follow-up is needed.    There  are elevated resistive indices in segmental renal arteries.  No significantly elevated velocities in the main renal arteries.  Renal veins are patent.                                       US Retroperitoneal Complete (Final result)  Result time 09/11/23 15:39:56      Final result by Malcom Castro MD (09/11/23 15:39:56)                   Narrative:    EXAMINATION  US RETROPERITONEAL COMPLETE    CLINICAL HISTORY  rule out hydronephrosis;    TECHNIQUE  Multiplanar grayscale sonographic images were obtained of the retroperitoneum and pelvis.    COMPARISON  None available at the time of initial interpretation.    FINDINGS  Exam quality: adequate for evaluation    Kidneys: The right kidney measures 8.2 cm x 3.4 cm x 4.4 cm, with the left kidney 8.1 cm x 3.9 cm x 3.9 cm.  The bilateral renal parenchyma is echogenic relative to the adjacent liver and spleen.  No solid mass-like lesion or complex cyst is identified.  A simple right lower pole cyst measures up to 3.1 cm in diameter, with additional simple cyst at the left upper renal cortex measuring 1.1 cm.  No collecting system dilatation.  No shadowing calcification is identified.  No perinephric collection or free abdominal fluid.    Bladder: No convincing intraluminal abnormality.  No wall thickening or focal mural lesion.    IMPRESSION  1. Increased echogenicity of the kidneys is nonspecific but suggest element of chronic medical renal disease.  2. Simple bilateral cortical cysts.  3. No evidence of acute urologic abnormality.      Electronically signed by: Malcom Castro  Date:    09/11/2023  Time:    15:39                                     CT Chest Without Contrast (Final result)  Result time 09/11/23 15:38:00      Final result by Malcom Castro MD (09/11/23 15:38:00)                   Narrative:    EXAMINATION  CT CHEST WITHOUT CONTRAST    CLINICAL HISTORY  possible new onset chf vs pneumonia;    TECHNIQUE  Non-contrast helical-acquisition CT images were  obtained and multiplanar reformats accomplished by a CT technologist at a separate workstation, pushed to PACS for physician review.    COMPARISON  Chest radiograph obtained earlier the same day.    FINDINGS  Images were reviewed in lung, soft tissue, and bone windows.    Exam quality: Limited secondary to patient movement throughout image acquisition, with resulting artifact.  Additionally, non-contrast protocol further limits overall diagnostic value.    Lines/tubes: none visualized    Heart chambers are prominent in size.  There is scattered vascular calcification, with no aneurysmal dilatation or other focal abnormality identified.  Hypoattenuation of the blood pool is nonspecific but suggest element of anemia.  There is no significant pericardial fluid.    Central airway patency is widely maintained.  Patchy bilateral multilobar areas of ground-glass airspace attenuation noted, with central predominance.  There are also small volume layering bilateral pleural effusions.  No loculated fluid component is identified.  There is no pneumothorax.    No convincing acute abnormality of the included upper abdominal solid organs.  Simple posterior right renal cortex cyst is incidentally identified.  The thyroid tissue is unremarkable.  No acute or destructive skeletal abnormality is identified.    IMPRESSION  1. Findings suggestive of central vascular congestion and developing pulmonary edema.  2. Small volume bilateral pleural effusions.  3. Nonspecific hypodensity of the blood pool, can be seen with anemia.    RADIATION DOSE  Automated tube current modulation, weight-based exposure dosing, and/or iterative reconstruction technique utilized to reach lowest reasonably achievable exposure rate.    DLP: 203 mGy*cm      Electronically signed by: Malcom Castro  Date:    09/11/2023  Time:    15:38                                     X-Ray Chest 1 View (Final result)  Result time 09/11/23 13:33:20      Final result by Matthew,  Malcom TREJO MD (09/11/23 13:33:20)                   Narrative:    EXAMINATION  XR CHEST 1 VIEW    CLINICAL HISTORY  sob;    TECHNIQUE  A total of 1 frontal image(s) of the chest.    COMPARISON  None available at the time of initial interpretation.    FINDINGS  Lines/tubes/devices: none present    The cardiomediastinal silhouette and central pulmonary vasculature are unremarkable for utilized technique.  The trachea is midline.  Ill-defined right perihilar and basilar infiltrate noted, with no organized lobar consolidation identified.  Left lung field is clear.  There is no large pleural effusion or convincing pneumothorax.    There is no acute osseous or extrathoracic abnormality.    IMPRESSION  Ill-defined right perihilar/basilar infiltrate may represent developing atypical infectious process.      Electronically signed by: Malcom Castro  Date:    09/11/2023  Time:    13:33                                  Current Medications:     Infusions:       Scheduled:   amLODIPine  5 mg Oral QHS    aspirin  81 mg Oral Daily    calcitRIOL  0.25 mcg Oral Every other day    carvediloL  25 mg Oral BID    heparin (porcine)  5,000 Units Subcutaneous Q12H    hydrALAZINE  75 mg Oral TID    isosorbide dinitrate  20 mg Oral TID    pantoprazole  40 mg Oral QHS    sevelamer carbonate  800 mg Oral TID WM        PRN:  dextrose 10%, dextrose 10%, glucagon (human recombinant), glucose, glucose, hydrALAZINE, labetalol, naloxone, sodium chloride 0.9%  Assessment & Plan:     Acute renal failure  Secondary Hyperparathyroidism  -BUN//12 upon initial presentation  -dialysis per nephrology, currently on M/W/F schedule  -Continue sevelamer and calcitriol per nephrology  -per nephrology, will likely need lifelong dialysis  -Tunneled HD line placed by surgery today    Hypertension  -continue carvedilol 25 mg BID, hydralazine 75 TID, isosorbide dinitrate 20 mg TID, and amlodipine 5 mg QD (but switch timing tp nightly)     HFrEF  -LVEF 45%  09/11/2023  -Unable to start ACE/ARB/ARNI, MRA, or SGLT2 given his renal failure  -continue carvedilol 25 mg BID and hydralazine/isosorbide dinitrate     Pulmonary Hypertension  - estimated pulmonary artery systolic pressure was 79 on TTE  - CT PE was negative for pulmonary embolus, only remarkable for pulmonary edema  - will need further workup with outpatient sleep study, and if negative, would benefit from right heart catheterization as outpatient    Disposition:  Continue inpatient management.  Continue dialysis per Nephrology.  Tunneled catheter placed today. Anticipate discharge tomorrow.    Rodrigo Pelaez MD  [facility] Internal Medicine, PGY-3

## 2023-09-19 NOTE — PROGRESS NOTES
"Nephrology Progress Note    Hospital course:   Patient admitted to ICU for hypertensive emergency placed on nitro drip.  Cardiology consulted for NSTEMI with troponins 0.304, now downtrended.  Overnight, patient placed on Cardene drip due to uncontrolled blood pressure.  Renal indices did not improve with administration of sodium bicarb drip. Underwent first round of HD and downgraded from the ICU on 9/12/23.    Subjective:   No acute events overnight, patient tolerated HD well yesterday-1.5 L ultrafiltration.  Reports no chest pains, shortness of breath, nausea, vomiting, fevers, chills.  Currently NPO awaiting tunnel catheter placement.  Other complaints at this time.      I/Os +1620 cc, -1500 cc, +120 cc      ROS:  Otherwise negative    Objective:   BP (!) 160/98   Pulse 76   Temp 98.2 °F (36.8 °C) (Oral)   Resp 20   Ht 5' 2" (1.575 m)   Wt 52.4 kg (115 lb 8 oz)   SpO2 97%   BMI 21.13 kg/m²     Intake/Output Summary (Last 24 hours) at 9/19/2023 1058  Last data filed at 9/19/2023 0626  Gross per 24 hour   Intake 1260 ml   Output 1500 ml   Net -240 ml          Physical exam:   General appearance: alert, appears stated age, cooperative and no distress  Head: Normocephalic, without obvious abnormality, atraumatic  Neck:  Trachea midline, right IJ in place  Lungs: clear to auscultation bilaterally  Heart: regular rate and rhythm, S1, S2 normal, no murmur, click, rub or gallop  Abdomen: soft, non-tender; bowel sounds normal; no masses,  no organomegaly  Extremities: extremities normal, atraumatic, no cyanosis or edema  Pulses: 2+ and symmetric  Skin: Skin color, texture, turgor normal. No rashes or lesions  Neurologic: Grossly normal        Laboratory data:   Recent Results (from the past 24 hour(s))   Comprehensive Metabolic Panel    Collection Time: 09/19/23  3:16 AM   Result Value Ref Range    Sodium Level 135 (L) 136 - 145 mmol/L    Potassium Level 4.1 3.5 - 5.1 mmol/L    Chloride 100 98 - 107 mmol/L    " Carbon Dioxide 23 23 - 31 mmol/L    Glucose Level 93 82 - 115 mg/dL    Blood Urea Nitrogen 35.5 (H) 8.4 - 25.7 mg/dL    Creatinine 6.36 (H) 0.73 - 1.18 mg/dL    Calcium Level Total 8.1 (L) 8.8 - 10.0 mg/dL    Protein Total 5.9 5.8 - 7.6 gm/dL    Albumin Level 2.5 (L) 3.4 - 4.8 g/dL    Globulin 3.4 2.4 - 3.5 gm/dL    Albumin/Globulin Ratio 0.7 (L) 1.1 - 2.0 ratio    Bilirubin Total 0.3 <=1.5 mg/dL    Alkaline Phosphatase 54 40 - 150 unit/L    Alanine Aminotransferase 31 0 - 55 unit/L    Aspartate Aminotransferase 24 5 - 34 unit/L    eGFR 9 mls/min/1.73/m2   Phosphorus    Collection Time: 09/19/23  3:16 AM   Result Value Ref Range    Phosphorus Level 5.3 (H) 2.3 - 4.7 mg/dL   Magnesium    Collection Time: 09/19/23  3:16 AM   Result Value Ref Range    Magnesium Level 2.00 1.60 - 2.60 mg/dL   CBC with Differential    Collection Time: 09/19/23  3:16 AM   Result Value Ref Range    WBC 7.16 4.50 - 11.50 x10(3)/mcL    RBC 4.49 (L) 4.70 - 6.10 x10(6)/mcL    Hgb 10.9 (L) 14.0 - 18.0 g/dL    Hct 34.9 (L) 42.0 - 52.0 %    MCV 77.7 (L) 80.0 - 94.0 fL    MCH 24.3 (L) 27.0 - 31.0 pg    MCHC 31.2 (L) 33.0 - 36.0 g/dL    RDW 14.9 11.5 - 17.0 %    Platelet 270 130 - 400 x10(3)/mcL    MPV 9.1 7.4 - 10.4 fL    Neut % 60.5 %    Lymph % 22.8 %    Mono % 12.2 %    Eos % 3.6 %    Basophil % 0.6 %    Lymph # 1.63 0.6 - 4.6 x10(3)/mcL    Neut # 4.34 2.1 - 9.2 x10(3)/mcL    Mono # 0.87 0.1 - 1.3 x10(3)/mcL    Eos # 0.26 0 - 0.9 x10(3)/mcL    Baso # 0.04 <=0.2 x10(3)/mcL    IG# 0.02 0 - 0.04 x10(3)/mcL    IG% 0.3 %    NRBC% 0.0 %       Imaging:   Imaging Results              US Doppler Renal Artery and Vein (xpd) (Final result)  Result time 09/12/23 12:27:06      Final result by Rodrigo Alegria MD (09/12/23 12:27:06)                   Impression:      1. Medical renal disease.  2. No hydronephrosis.  1.    Electronically signed by: Rodrigo Alegria  Date:    09/12/2023  Time:    12:27               Narrative:    EXAMINATION:  US DOPPLER RENAL  ARTERY AND VEIN (XPD)    CLINICAL HISTORY:  hypertensive emergency;    COMPARISON:  11 September 2023    FINDINGS:  Grayscale, color and spectral Doppler sonographic evaluation of the kidneys and renal vasculature.    The right kidney measures 8.5 cm. The left kidney measures 8 cm.   No hydronephrosis.  There is increased renal parenchymal echogenicity.  There is a right renal cyst measuring 3-4 cm.  No follow-up is needed.    There are elevated resistive indices in segmental renal arteries.  No significantly elevated velocities in the main renal arteries.  Renal veins are patent.                                       US Retroperitoneal Complete (Final result)  Result time 09/11/23 15:39:56      Final result by Malcom Castro MD (09/11/23 15:39:56)                   Narrative:    EXAMINATION  US RETROPERITONEAL COMPLETE    CLINICAL HISTORY  rule out hydronephrosis;    TECHNIQUE  Multiplanar grayscale sonographic images were obtained of the retroperitoneum and pelvis.    COMPARISON  None available at the time of initial interpretation.    FINDINGS  Exam quality: adequate for evaluation    Kidneys: The right kidney measures 8.2 cm x 3.4 cm x 4.4 cm, with the left kidney 8.1 cm x 3.9 cm x 3.9 cm.  The bilateral renal parenchyma is echogenic relative to the adjacent liver and spleen.  No solid mass-like lesion or complex cyst is identified.  A simple right lower pole cyst measures up to 3.1 cm in diameter, with additional simple cyst at the left upper renal cortex measuring 1.1 cm.  No collecting system dilatation.  No shadowing calcification is identified.  No perinephric collection or free abdominal fluid.    Bladder: No convincing intraluminal abnormality.  No wall thickening or focal mural lesion.    IMPRESSION  1. Increased echogenicity of the kidneys is nonspecific but suggest element of chronic medical renal disease.  2. Simple bilateral cortical cysts.  3. No evidence of acute urologic  abnormality.      Electronically signed by: Malcom Castro  Date:    09/11/2023  Time:    15:39                                     CT Chest Without Contrast (Final result)  Result time 09/11/23 15:38:00      Final result by Malcom Castro MD (09/11/23 15:38:00)                   Narrative:    EXAMINATION  CT CHEST WITHOUT CONTRAST    CLINICAL HISTORY  possible new onset chf vs pneumonia;    TECHNIQUE  Non-contrast helical-acquisition CT images were obtained and multiplanar reformats accomplished by a CT technologist at a separate workstation, pushed to PACS for physician review.    COMPARISON  Chest radiograph obtained earlier the same day.    FINDINGS  Images were reviewed in lung, soft tissue, and bone windows.    Exam quality: Limited secondary to patient movement throughout image acquisition, with resulting artifact.  Additionally, non-contrast protocol further limits overall diagnostic value.    Lines/tubes: none visualized    Heart chambers are prominent in size.  There is scattered vascular calcification, with no aneurysmal dilatation or other focal abnormality identified.  Hypoattenuation of the blood pool is nonspecific but suggest element of anemia.  There is no significant pericardial fluid.    Central airway patency is widely maintained.  Patchy bilateral multilobar areas of ground-glass airspace attenuation noted, with central predominance.  There are also small volume layering bilateral pleural effusions.  No loculated fluid component is identified.  There is no pneumothorax.    No convincing acute abnormality of the included upper abdominal solid organs.  Simple posterior right renal cortex cyst is incidentally identified.  The thyroid tissue is unremarkable.  No acute or destructive skeletal abnormality is identified.    IMPRESSION  1. Findings suggestive of central vascular congestion and developing pulmonary edema.  2. Small volume bilateral pleural effusions.  3. Nonspecific hypodensity of the  blood pool, can be seen with anemia.    RADIATION DOSE  Automated tube current modulation, weight-based exposure dosing, and/or iterative reconstruction technique utilized to reach lowest reasonably achievable exposure rate.    DLP: 203 mGy*cm      Electronically signed by: Malcom Castro  Date:    09/11/2023  Time:    15:38                                     X-Ray Chest 1 View (Final result)  Result time 09/11/23 13:33:20      Final result by Malcom Castro MD (09/11/23 13:33:20)                   Narrative:    EXAMINATION  XR CHEST 1 VIEW    CLINICAL HISTORY  sob;    TECHNIQUE  A total of 1 frontal image(s) of the chest.    COMPARISON  None available at the time of initial interpretation.    FINDINGS  Lines/tubes/devices: none present    The cardiomediastinal silhouette and central pulmonary vasculature are unremarkable for utilized technique.  The trachea is midline.  Ill-defined right perihilar and basilar infiltrate noted, with no organized lobar consolidation identified.  Left lung field is clear.  There is no large pleural effusion or convincing pneumothorax.    There is no acute osseous or extrathoracic abnormality.    IMPRESSION  Ill-defined right perihilar/basilar infiltrate may represent developing atypical infectious process.      Electronically signed by: Malcom Castro  Date:    09/11/2023  Time:    13:33                                     Medications:     Current Facility-Administered Medications:     amLODIPine tablet 5 mg, 5 mg, Oral, QHS, Abner Weaver MD, 5 mg at 09/18/23 2004    aspirin chewable tablet 81 mg, 81 mg, Oral, Daily, Austin Giraldo DO, 81 mg at 09/18/23 0836    calcitRIOL capsule 0.25 mcg, 0.25 mcg, Oral, Every other day, Breanna Webster FNP, 0.25 mcg at 09/18/23 0837    carvediloL tablet 25 mg, 25 mg, Oral, BID, Rodrigo Pelaez MD, 25 mg at 09/19/23 0930    cefazolin (ANCEF) 2 gram in dextrose 5% 50 mL IVPB (premix), 2 g, Intravenous, Once, Dwight Cochran MD    dextrose 10% bolus  125 mL 125 mL, 12.5 g, Intravenous, PRN, Stroda, Austin, DO    dextrose 10% bolus 250 mL 250 mL, 25 g, Intravenous, PRN, Stroda, Austin, DO    glucagon (human recombinant) injection 1 mg, 1 mg, Intramuscular, PRN, Stroda, Austin, DO    glucose chewable tablet 16 g, 16 g, Oral, PRN, Stroda, Austin, DO    glucose chewable tablet 24 g, 24 g, Oral, PRN, Stroda, Austin, DO    heparin (porcine) injection 5,000 Units, 5,000 Units, Subcutaneous, Q12H, Rodrigo Pelaez MD, 5,000 Units at 09/18/23 2004    hydrALAZINE injection 10 mg, 10 mg, Intravenous, Q2H PRN, Rodrigo Pelaez MD, 10 mg at 09/18/23 1840    hydrALAZINE tablet 75 mg, 75 mg, Oral, TID, Abner Weaver MD, 75 mg at 09/19/23 0931    isosorbide dinitrate tablet 20 mg, 20 mg, Oral, TID, Abner Weaver MD, 20 mg at 09/19/23 0932    labetalol 20 mg/4 mL (5 mg/mL) IV syring, 10 mg, Intravenous, Q2H PRN, Rodrigo Pelaez MD, 10 mg at 09/19/23 0331    naloxone 0.4 mg/mL injection 0.02 mg, 0.02 mg, Intravenous, PRN, Stroda, Austin, DO    pantoprazole EC tablet 40 mg, 40 mg, Oral, QHS, Stroda, Austin, DO, 40 mg at 09/18/23 2001    sevelamer carbonate tablet 800 mg, 800 mg, Oral, TID , Brittany Hyman MD, 800 mg at 09/18/23 1838    sodium chloride 0.9% flush 10 mL, 10 mL, Intravenous, Q12H PRN, Stroda, Austin, DO     Impression/Plan   Nonoliguric Acute renal failure vs ESRD  metabolic acidosis  Proteinuria  -etiology of ESRD currently unclear (hypertensive nephropathy versus FSGS) would benefit from renal biopsy after discharge   -continue with HD on MWF basis while inpatient   -planning for tunnel cath with General surgery on 09/19/2023, appreciate their assistance, will plan to have session of HD after tunnel catheter placement    3. Hypertension  4. Congestive Heart Failure  5. NSTEMI  -Rest of management per primary team  -may benefit from labetalol versus carvedilol for improved BP management    Yolie Kwan DO  [facility] Internal Medicine, HO-3  09/19/2023      Attending Addendum to  follow:

## 2023-09-20 VITALS
SYSTOLIC BLOOD PRESSURE: 161 MMHG | OXYGEN SATURATION: 98 % | HEART RATE: 67 BPM | TEMPERATURE: 98 F | RESPIRATION RATE: 18 BRPM | HEIGHT: 62 IN | WEIGHT: 116.19 LBS | BODY MASS INDEX: 21.38 KG/M2 | DIASTOLIC BLOOD PRESSURE: 82 MMHG

## 2023-09-20 PROBLEM — N17.9 ARF (ACUTE RENAL FAILURE): Status: ACTIVE | Noted: 2023-09-20

## 2023-09-20 PROBLEM — I16.1 HYPERTENSIVE EMERGENCY: Status: ACTIVE | Noted: 2023-09-20

## 2023-09-20 LAB
ALBUMIN SERPL-MCNC: 2.4 G/DL (ref 3.4–4.8)
ALBUMIN/GLOB SERPL: 0.8 RATIO (ref 1.1–2)
ALP SERPL-CCNC: 50 UNIT/L (ref 40–150)
ALT SERPL-CCNC: 28 UNIT/L (ref 0–55)
AST SERPL-CCNC: 21 UNIT/L (ref 5–34)
BASOPHILS # BLD AUTO: 0.03 X10(3)/MCL
BASOPHILS NFR BLD AUTO: 0.5 %
BILIRUB SERPL-MCNC: 0.3 MG/DL
BUN SERPL-MCNC: 22.8 MG/DL (ref 8.4–25.7)
CALCIUM SERPL-MCNC: 8.1 MG/DL (ref 8.8–10)
CHLORIDE SERPL-SCNC: 104 MMOL/L (ref 98–107)
CO2 SERPL-SCNC: 21 MMOL/L (ref 23–31)
CREAT SERPL-MCNC: 5.68 MG/DL (ref 0.73–1.18)
EOSINOPHIL # BLD AUTO: 0.2 X10(3)/MCL (ref 0–0.9)
EOSINOPHIL NFR BLD AUTO: 3.4 %
ERYTHROCYTE [DISTWIDTH] IN BLOOD BY AUTOMATED COUNT: 15.3 % (ref 11.5–17)
GFR SERPLBLD CREATININE-BSD FMLA CKD-EPI: 11 MLS/MIN/1.73/M2
GLOBULIN SER-MCNC: 3.1 GM/DL (ref 2.4–3.5)
GLUCOSE SERPL-MCNC: 86 MG/DL (ref 82–115)
HCT VFR BLD AUTO: 33.8 % (ref 42–52)
HGB BLD-MCNC: 10.6 G/DL (ref 14–18)
IMM GRANULOCYTES # BLD AUTO: 0.02 X10(3)/MCL (ref 0–0.04)
IMM GRANULOCYTES NFR BLD AUTO: 0.3 %
LYMPHOCYTES # BLD AUTO: 1.4 X10(3)/MCL (ref 0.6–4.6)
LYMPHOCYTES NFR BLD AUTO: 24.1 %
MAGNESIUM SERPL-MCNC: 1.9 MG/DL (ref 1.6–2.6)
MCH RBC QN AUTO: 24.7 PG (ref 27–31)
MCHC RBC AUTO-ENTMCNC: 31.4 G/DL (ref 33–36)
MCV RBC AUTO: 78.6 FL (ref 80–94)
MONOCYTES # BLD AUTO: 0.88 X10(3)/MCL (ref 0.1–1.3)
MONOCYTES NFR BLD AUTO: 15.1 %
NEUTROPHILS # BLD AUTO: 3.28 X10(3)/MCL (ref 2.1–9.2)
NEUTROPHILS NFR BLD AUTO: 56.6 %
NRBC BLD AUTO-RTO: 0 %
PHOSPHATE SERPL-MCNC: 4.7 MG/DL (ref 2.3–4.7)
PLATELET # BLD AUTO: 271 X10(3)/MCL (ref 130–400)
PMV BLD AUTO: 9.4 FL (ref 7.4–10.4)
POTASSIUM SERPL-SCNC: 4.5 MMOL/L (ref 3.5–5.1)
PROT SERPL-MCNC: 5.5 GM/DL (ref 5.8–7.6)
RBC # BLD AUTO: 4.3 X10(6)/MCL (ref 4.7–6.1)
SODIUM SERPL-SCNC: 135 MMOL/L (ref 136–145)
WBC # SPEC AUTO: 5.81 X10(3)/MCL (ref 4.5–11.5)

## 2023-09-20 PROCEDURE — 84100 ASSAY OF PHOSPHORUS: CPT | Performed by: STUDENT IN AN ORGANIZED HEALTH CARE EDUCATION/TRAINING PROGRAM

## 2023-09-20 PROCEDURE — 25000003 PHARM REV CODE 250: Performed by: INTERNAL MEDICINE

## 2023-09-20 PROCEDURE — 63600175 PHARM REV CODE 636 W HCPCS: Performed by: STUDENT IN AN ORGANIZED HEALTH CARE EDUCATION/TRAINING PROGRAM

## 2023-09-20 PROCEDURE — 25000003 PHARM REV CODE 250

## 2023-09-20 PROCEDURE — 85025 COMPLETE CBC W/AUTO DIFF WBC: CPT | Performed by: STUDENT IN AN ORGANIZED HEALTH CARE EDUCATION/TRAINING PROGRAM

## 2023-09-20 PROCEDURE — 83735 ASSAY OF MAGNESIUM: CPT | Performed by: STUDENT IN AN ORGANIZED HEALTH CARE EDUCATION/TRAINING PROGRAM

## 2023-09-20 PROCEDURE — 25000003 PHARM REV CODE 250: Performed by: STUDENT IN AN ORGANIZED HEALTH CARE EDUCATION/TRAINING PROGRAM

## 2023-09-20 PROCEDURE — 80053 COMPREHEN METABOLIC PANEL: CPT | Performed by: STUDENT IN AN ORGANIZED HEALTH CARE EDUCATION/TRAINING PROGRAM

## 2023-09-20 PROCEDURE — 25000003 PHARM REV CODE 250: Performed by: NURSE PRACTITIONER

## 2023-09-20 RX ORDER — HYDRALAZINE HYDROCHLORIDE 25 MG/1
75 TABLET, FILM COATED ORAL 3 TIMES DAILY
Qty: 270 TABLET | Refills: 11 | Status: ON HOLD | OUTPATIENT
Start: 2023-09-20 | End: 2023-10-05 | Stop reason: SDUPTHER

## 2023-09-20 RX ORDER — ATORVASTATIN CALCIUM 40 MG/1
40 TABLET, FILM COATED ORAL DAILY
Qty: 90 TABLET | Refills: 3 | Status: SHIPPED | OUTPATIENT
Start: 2023-09-20 | End: 2024-09-19

## 2023-09-20 RX ORDER — CARVEDILOL 25 MG/1
25 TABLET ORAL 2 TIMES DAILY
Qty: 60 TABLET | Refills: 11 | Status: SHIPPED | OUTPATIENT
Start: 2023-09-20 | End: 2024-09-19

## 2023-09-20 RX ORDER — CALCITRIOL 0.25 UG/1
0.25 CAPSULE ORAL EVERY OTHER DAY
Qty: 90 CAPSULE | Refills: 3 | Status: ON HOLD | OUTPATIENT
Start: 2023-09-22 | End: 2023-10-05 | Stop reason: SDUPTHER

## 2023-09-20 RX ORDER — ISOSORBIDE DINITRATE 20 MG/1
20 TABLET ORAL 3 TIMES DAILY
Qty: 90 TABLET | Refills: 11 | Status: SHIPPED | OUTPATIENT
Start: 2023-09-20 | End: 2024-09-19

## 2023-09-20 RX ORDER — NAPROXEN SODIUM 220 MG/1
81 TABLET, FILM COATED ORAL DAILY
Qty: 90 TABLET | Refills: 3 | Status: ON HOLD | OUTPATIENT
Start: 2023-09-21 | End: 2023-10-05 | Stop reason: HOSPADM

## 2023-09-20 RX ORDER — AMLODIPINE BESYLATE 10 MG/1
10 TABLET ORAL NIGHTLY
Qty: 90 TABLET | Refills: 3 | Status: SHIPPED | OUTPATIENT
Start: 2023-09-20 | End: 2024-09-19

## 2023-09-20 RX ORDER — SEVELAMER CARBONATE 800 MG/1
800 TABLET, FILM COATED ORAL
Qty: 90 TABLET | Refills: 11 | Status: SHIPPED | OUTPATIENT
Start: 2023-09-20 | End: 2024-09-19

## 2023-09-20 RX ADMIN — ISOSORBIDE DINITRATE 20 MG: 20 TABLET ORAL at 08:09

## 2023-09-20 RX ADMIN — SEVELAMER CARBONATE 800 MG: 800 TABLET, FILM COATED ORAL at 08:09

## 2023-09-20 RX ADMIN — SEVELAMER CARBONATE 800 MG: 800 TABLET, FILM COATED ORAL at 12:09

## 2023-09-20 RX ADMIN — ISOSORBIDE DINITRATE 20 MG: 20 TABLET ORAL at 03:09

## 2023-09-20 RX ADMIN — HYDRALAZINE HYDROCHLORIDE 75 MG: 50 TABLET ORAL at 08:09

## 2023-09-20 RX ADMIN — ASPIRIN 81 MG CHEWABLE TABLET 81 MG: 81 TABLET CHEWABLE at 08:09

## 2023-09-20 RX ADMIN — CARVEDILOL 25 MG: 12.5 TABLET, FILM COATED ORAL at 08:09

## 2023-09-20 RX ADMIN — HEPARIN SODIUM 5000 UNITS: 5000 INJECTION, SOLUTION INTRAVENOUS; SUBCUTANEOUS at 08:09

## 2023-09-20 RX ADMIN — HYDRALAZINE HYDROCHLORIDE 75 MG: 50 TABLET ORAL at 03:09

## 2023-09-20 RX ADMIN — CALCITRIOL CAPSULES 0.25 MCG 0.25 MCG: 0.25 CAPSULE ORAL at 08:09

## 2023-09-20 NOTE — PROGRESS NOTES
"Nephrology Progress Note    Hospital course:   Patient admitted to ICU for hypertensive emergency placed on nitro drip.  Cardiology consulted for NSTEMI with troponins 0.304, now downtrended.  Overnight, patient placed on Cardene drip due to uncontrolled blood pressure.  Renal indices did not improve with administration of sodium bicarb drip. Underwent first round of HD and downgraded from the ICU on 9/12/23.    Subjective:   No acute events overnight, patient tolerated tunnel cath placement well.  Reports no acute complaints, no chest pains, no shortness a breath, no nausea, no vomiting, no fevers, no chills.  No issues with catheter at this time.    ROS:  Otherwise negative    Objective:   BP (!) 165/84 (BP Location: Left arm, Patient Position: Sitting)   Pulse 76   Temp 98.1 °F (36.7 °C) (Oral)   Resp 18   Ht 5' 2" (1.575 m)   Wt 52.7 kg (116 lb 3.2 oz)   SpO2 97%   BMI 21.25 kg/m²     Intake/Output Summary (Last 24 hours) at 9/20/2023 0921  Last data filed at 9/20/2023 0450  Gross per 24 hour   Intake 480 ml   Output --   Net 480 ml          Physical exam:   General appearance: alert, appears stated age, cooperative and no distress  Head: Normocephalic, without obvious abnormality, atraumatic  Neck:  Trachea midline  Lungs: clear to auscultation bilaterally  Heart: regular rate and rhythm, S1, S2 normal, no murmur, click, rub or gallop, tunnel catheter placement on right anterior chest, bandage over top, dry, intact  Abdomen: soft, non-tender; bowel sounds normal; no masses,  no organomegaly  Extremities: extremities normal, atraumatic, no cyanosis or edema  Pulses: 2+ and symmetric  Skin: Skin color, texture, turgor normal. No rashes or lesions  Neurologic: Grossly normal        Laboratory data:   Recent Results (from the past 24 hour(s))   CBC with Differential    Collection Time: 09/20/23  3:15 AM   Result Value Ref Range    WBC 5.81 4.50 - 11.50 x10(3)/mcL    RBC 4.30 (L) 4.70 - 6.10 x10(6)/mcL    Hgb " 10.6 (L) 14.0 - 18.0 g/dL    Hct 33.8 (L) 42.0 - 52.0 %    MCV 78.6 (L) 80.0 - 94.0 fL    MCH 24.7 (L) 27.0 - 31.0 pg    MCHC 31.4 (L) 33.0 - 36.0 g/dL    RDW 15.3 11.5 - 17.0 %    Platelet 271 130 - 400 x10(3)/mcL    MPV 9.4 7.4 - 10.4 fL    Neut % 56.6 %    Lymph % 24.1 %    Mono % 15.1 %    Eos % 3.4 %    Basophil % 0.5 %    Lymph # 1.40 0.6 - 4.6 x10(3)/mcL    Neut # 3.28 2.1 - 9.2 x10(3)/mcL    Mono # 0.88 0.1 - 1.3 x10(3)/mcL    Eos # 0.20 0 - 0.9 x10(3)/mcL    Baso # 0.03 <=0.2 x10(3)/mcL    IG# 0.02 0 - 0.04 x10(3)/mcL    IG% 0.3 %    NRBC% 0.0 %   Comprehensive Metabolic Panel    Collection Time: 09/20/23  4:19 AM   Result Value Ref Range    Sodium Level 135 (L) 136 - 145 mmol/L    Potassium Level 4.5 3.5 - 5.1 mmol/L    Chloride 104 98 - 107 mmol/L    Carbon Dioxide 21 (L) 23 - 31 mmol/L    Glucose Level 86 82 - 115 mg/dL    Blood Urea Nitrogen 22.8 8.4 - 25.7 mg/dL    Creatinine 5.68 (H) 0.73 - 1.18 mg/dL    Calcium Level Total 8.1 (L) 8.8 - 10.0 mg/dL    Protein Total 5.5 (L) 5.8 - 7.6 gm/dL    Albumin Level 2.4 (L) 3.4 - 4.8 g/dL    Globulin 3.1 2.4 - 3.5 gm/dL    Albumin/Globulin Ratio 0.8 (L) 1.1 - 2.0 ratio    Bilirubin Total 0.3 <=1.5 mg/dL    Alkaline Phosphatase 50 40 - 150 unit/L    Alanine Aminotransferase 28 0 - 55 unit/L    Aspartate Aminotransferase 21 5 - 34 unit/L    eGFR 11 mls/min/1.73/m2   Magnesium    Collection Time: 09/20/23  4:19 AM   Result Value Ref Range    Magnesium Level 1.90 1.60 - 2.60 mg/dL   Phosphorus    Collection Time: 09/20/23  4:19 AM   Result Value Ref Range    Phosphorus Level 4.7 2.3 - 4.7 mg/dL       Imaging:   Imaging Results              US Doppler Renal Artery and Vein (xpd) (Final result)  Result time 09/12/23 12:27:06      Final result by Rodrigo Alegria MD (09/12/23 12:27:06)                   Impression:      1. Medical renal disease.  2. No hydronephrosis.  1.    Electronically signed by: Rodrigo Alegria  Date:    09/12/2023  Time:    12:27                Narrative:    EXAMINATION:  US DOPPLER RENAL ARTERY AND VEIN (XPD)    CLINICAL HISTORY:  hypertensive emergency;    COMPARISON:  11 September 2023    FINDINGS:  Grayscale, color and spectral Doppler sonographic evaluation of the kidneys and renal vasculature.    The right kidney measures 8.5 cm. The left kidney measures 8 cm.   No hydronephrosis.  There is increased renal parenchymal echogenicity.  There is a right renal cyst measuring 3-4 cm.  No follow-up is needed.    There are elevated resistive indices in segmental renal arteries.  No significantly elevated velocities in the main renal arteries.  Renal veins are patent.                                       US Retroperitoneal Complete (Final result)  Result time 09/11/23 15:39:56      Final result by Malcom Castro MD (09/11/23 15:39:56)                   Narrative:    EXAMINATION  US RETROPERITONEAL COMPLETE    CLINICAL HISTORY  rule out hydronephrosis;    TECHNIQUE  Multiplanar grayscale sonographic images were obtained of the retroperitoneum and pelvis.    COMPARISON  None available at the time of initial interpretation.    FINDINGS  Exam quality: adequate for evaluation    Kidneys: The right kidney measures 8.2 cm x 3.4 cm x 4.4 cm, with the left kidney 8.1 cm x 3.9 cm x 3.9 cm.  The bilateral renal parenchyma is echogenic relative to the adjacent liver and spleen.  No solid mass-like lesion or complex cyst is identified.  A simple right lower pole cyst measures up to 3.1 cm in diameter, with additional simple cyst at the left upper renal cortex measuring 1.1 cm.  No collecting system dilatation.  No shadowing calcification is identified.  No perinephric collection or free abdominal fluid.    Bladder: No convincing intraluminal abnormality.  No wall thickening or focal mural lesion.    IMPRESSION  1. Increased echogenicity of the kidneys is nonspecific but suggest element of chronic medical renal disease.  2. Simple bilateral cortical cysts.  3. No  evidence of acute urologic abnormality.      Electronically signed by: Malcom Castro  Date:    09/11/2023  Time:    15:39                                     CT Chest Without Contrast (Final result)  Result time 09/11/23 15:38:00      Final result by Malcom Castro MD (09/11/23 15:38:00)                   Narrative:    EXAMINATION  CT CHEST WITHOUT CONTRAST    CLINICAL HISTORY  possible new onset chf vs pneumonia;    TECHNIQUE  Non-contrast helical-acquisition CT images were obtained and multiplanar reformats accomplished by a CT technologist at a separate workstation, pushed to PACS for physician review.    COMPARISON  Chest radiograph obtained earlier the same day.    FINDINGS  Images were reviewed in lung, soft tissue, and bone windows.    Exam quality: Limited secondary to patient movement throughout image acquisition, with resulting artifact.  Additionally, non-contrast protocol further limits overall diagnostic value.    Lines/tubes: none visualized    Heart chambers are prominent in size.  There is scattered vascular calcification, with no aneurysmal dilatation or other focal abnormality identified.  Hypoattenuation of the blood pool is nonspecific but suggest element of anemia.  There is no significant pericardial fluid.    Central airway patency is widely maintained.  Patchy bilateral multilobar areas of ground-glass airspace attenuation noted, with central predominance.  There are also small volume layering bilateral pleural effusions.  No loculated fluid component is identified.  There is no pneumothorax.    No convincing acute abnormality of the included upper abdominal solid organs.  Simple posterior right renal cortex cyst is incidentally identified.  The thyroid tissue is unremarkable.  No acute or destructive skeletal abnormality is identified.    IMPRESSION  1. Findings suggestive of central vascular congestion and developing pulmonary edema.  2. Small volume bilateral pleural effusions.  3.  Nonspecific hypodensity of the blood pool, can be seen with anemia.    RADIATION DOSE  Automated tube current modulation, weight-based exposure dosing, and/or iterative reconstruction technique utilized to reach lowest reasonably achievable exposure rate.    DLP: 203 mGy*cm      Electronically signed by: Malcom Castro  Date:    09/11/2023  Time:    15:38                                     X-Ray Chest 1 View (Final result)  Result time 09/11/23 13:33:20      Final result by Malcom Castro MD (09/11/23 13:33:20)                   Narrative:    EXAMINATION  XR CHEST 1 VIEW    CLINICAL HISTORY  sob;    TECHNIQUE  A total of 1 frontal image(s) of the chest.    COMPARISON  None available at the time of initial interpretation.    FINDINGS  Lines/tubes/devices: none present    The cardiomediastinal silhouette and central pulmonary vasculature are unremarkable for utilized technique.  The trachea is midline.  Ill-defined right perihilar and basilar infiltrate noted, with no organized lobar consolidation identified.  Left lung field is clear.  There is no large pleural effusion or convincing pneumothorax.    There is no acute osseous or extrathoracic abnormality.    IMPRESSION  Ill-defined right perihilar/basilar infiltrate may represent developing atypical infectious process.      Electronically signed by: Malcom Castro  Date:    09/11/2023  Time:    13:33                                     Medications:     Current Facility-Administered Medications:     amLODIPine tablet 5 mg, 5 mg, Oral, QHS, Abner Weaver MD, 5 mg at 09/19/23 2036    aspirin chewable tablet 81 mg, 81 mg, Oral, Daily, Austin Giraldo, DO, 81 mg at 09/20/23 0831    calcitRIOL capsule 0.25 mcg, 0.25 mcg, Oral, Every other day, Breanna Webster FNP, 0.25 mcg at 09/20/23 0831    carvediloL tablet 25 mg, 25 mg, Oral, BID, Rodrigo Pelaez MD, 25 mg at 09/20/23 0831    cefazolin (ANCEF) 2 gram in dextrose 5% 50 mL IVPB (premix), 2 g, Intravenous, Once, [place],  MD Dwight    dextrose 10% bolus 125 mL 125 mL, 12.5 g, Intravenous, PRN, Stroda, Austin, DO    dextrose 10% bolus 250 mL 250 mL, 25 g, Intravenous, PRN, Stroda, Austin, DO    glucagon (human recombinant) injection 1 mg, 1 mg, Intramuscular, PRN, Stroda, Austin, DO    glucose chewable tablet 16 g, 16 g, Oral, PRN, Stroda, Austin, DO    glucose chewable tablet 24 g, 24 g, Oral, PRN, Stroda, Austin, DO    heparin (porcine) injection 5,000 Units, 5,000 Units, Subcutaneous, Q12H, Rodrigo Pelaez MD, 5,000 Units at 09/20/23 0832    hydrALAZINE injection 10 mg, 10 mg, Intravenous, Q2H PRN, Rodrigo Pelaez MD, 10 mg at 09/18/23 1840    hydrALAZINE tablet 75 mg, 75 mg, Oral, TID, Abner Weaver MD, 75 mg at 09/20/23 0831    isosorbide dinitrate tablet 20 mg, 20 mg, Oral, TID, Abner Weaver MD, 20 mg at 09/20/23 0831    labetalol 20 mg/4 mL (5 mg/mL) IV syring, 10 mg, Intravenous, Q2H PRN, Rodrigo Pelaez MD, 10 mg at 09/19/23 0331    naloxone 0.4 mg/mL injection 0.02 mg, 0.02 mg, Intravenous, PRN, Stroda, Austin, DO    pantoprazole EC tablet 40 mg, 40 mg, Oral, QHS, Stroda, Austin, DO, 40 mg at 09/19/23 2037    sevelamer carbonate tablet 800 mg, 800 mg, Oral, TID , Brittany Hyman MD, 800 mg at 09/20/23 0831    sodium chloride 0.9% flush 10 mL, 10 mL, Intravenous, Q12H PRN, Stroda, Austin, DO     Impression/Plan   Nonoliguric Acute renal failure vs ESRD  metabolic acidosis  Proteinuria  -etiology of ESRD currently unclear (hypertensive nephropathy versus FSGS) would benefit from renal biopsy after discharge   -continue with HD on MWF basis while inpatient   -tunnel catheter placed on 09/19/2023 with vascular surgery, appreciate their assistance   -patient will likely need HD sessions at least twice a week as outpatient    3. Hypertension  4. Congestive Heart Failure  5. NSTEMI  -Rest of management per primary team      Yolie Kwan,   [facility] Internal [facility], -3  09/20/2023      Attending Addendum to follow:

## 2023-09-20 NOTE — DISCHARGE INSTRUCTIONS
Patient alert and oriented. Focused on discharge. Removed cardiac monitor, IV and gave instructions on follow-up. Pt verbalized understanding.

## 2023-09-22 ENCOUNTER — HOSPITAL ENCOUNTER (OUTPATIENT)
Facility: HOSPITAL | Age: 63
Discharge: HOME | End: 2023-09-23
Attending: EMERGENCY MEDICINE | Admitting: STUDENT IN AN ORGANIZED HEALTH CARE EDUCATION/TRAINING PROGRAM

## 2023-09-22 ENCOUNTER — PATIENT OUTREACH (OUTPATIENT)
Dept: ADMINISTRATIVE | Facility: CLINIC | Age: 63
End: 2023-09-22

## 2023-09-22 DIAGNOSIS — E87.1 HYPONATREMIA: Primary | ICD-10-CM

## 2023-09-22 DIAGNOSIS — N18.6 ESRD (END STAGE RENAL DISEASE): ICD-10-CM

## 2023-09-22 DIAGNOSIS — R07.9 CHEST PAIN: ICD-10-CM

## 2023-09-22 DIAGNOSIS — E87.5 HYPERKALEMIA: ICD-10-CM

## 2023-09-22 LAB
ALBUMIN SERPL-MCNC: 3 G/DL (ref 3.4–4.8)
ALBUMIN/GLOB SERPL: 0.8 RATIO (ref 1.1–2)
ALP SERPL-CCNC: 58 UNIT/L (ref 40–150)
ALT SERPL-CCNC: 31 UNIT/L (ref 0–55)
ANION GAP SERPL CALC-SCNC: 7 MEQ/L
APPEARANCE UR: CLEAR
AST SERPL-CCNC: 25 UNIT/L (ref 5–34)
BACTERIA #/AREA URNS AUTO: ABNORMAL /HPF
BASOPHILS # BLD AUTO: 0.06 X10(3)/MCL
BASOPHILS NFR BLD AUTO: 1 %
BILIRUB SERPL-MCNC: 0.4 MG/DL
BILIRUB UR QL STRIP.AUTO: NEGATIVE
BUN SERPL-MCNC: 15.3 MG/DL (ref 8.4–25.7)
BUN SERPL-MCNC: 43.3 MG/DL (ref 8.4–25.7)
CALCIUM SERPL-MCNC: 8.3 MG/DL (ref 8.8–10)
CALCIUM SERPL-MCNC: 8.6 MG/DL (ref 8.8–10)
CHLORIDE SERPL-SCNC: 94 MMOL/L (ref 98–107)
CHLORIDE SERPL-SCNC: 95 MMOL/L (ref 98–107)
CO2 SERPL-SCNC: 20 MMOL/L (ref 23–31)
CO2 SERPL-SCNC: 28 MMOL/L (ref 23–31)
COLOR UR AUTO: COLORLESS
CREAT SERPL-MCNC: 4.38 MG/DL (ref 0.73–1.18)
CREAT SERPL-MCNC: 8.35 MG/DL (ref 0.73–1.18)
CREAT/UREA NIT SERPL: 3
EOSINOPHIL # BLD AUTO: 0.24 X10(3)/MCL (ref 0–0.9)
EOSINOPHIL NFR BLD AUTO: 4.1 %
ERYTHROCYTE [DISTWIDTH] IN BLOOD BY AUTOMATED COUNT: 14.8 % (ref 11.5–17)
GFR SERPLBLD CREATININE-BSD FMLA CKD-EPI: 15 MLS/MIN/1.73/M2
GFR SERPLBLD CREATININE-BSD FMLA CKD-EPI: 7 MLS/MIN/1.73/M2
GLOBULIN SER-MCNC: 3.6 GM/DL (ref 2.4–3.5)
GLUCOSE SERPL-MCNC: 108 MG/DL (ref 82–115)
GLUCOSE SERPL-MCNC: 131 MG/DL (ref 82–115)
GLUCOSE UR QL STRIP.AUTO: NORMAL
HCT VFR BLD AUTO: 35.6 % (ref 42–52)
HGB BLD-MCNC: 11.4 G/DL (ref 14–18)
HYALINE CASTS #/AREA URNS LPF: ABNORMAL /LPF
IMM GRANULOCYTES # BLD AUTO: 0.01 X10(3)/MCL (ref 0–0.04)
IMM GRANULOCYTES NFR BLD AUTO: 0.2 %
KETONES UR QL STRIP.AUTO: NEGATIVE
LEUKOCYTE ESTERASE UR QL STRIP.AUTO: NEGATIVE
LYMPHOCYTES # BLD AUTO: 0.99 X10(3)/MCL (ref 0.6–4.6)
LYMPHOCYTES NFR BLD AUTO: 16.8 %
MAGNESIUM SERPL-MCNC: 2.1 MG/DL (ref 1.6–2.6)
MCH RBC QN AUTO: 24.7 PG (ref 27–31)
MCHC RBC AUTO-ENTMCNC: 32 G/DL (ref 33–36)
MCV RBC AUTO: 77.2 FL (ref 80–94)
MONOCYTES # BLD AUTO: 0.69 X10(3)/MCL (ref 0.1–1.3)
MONOCYTES NFR BLD AUTO: 11.7 %
NEUTROPHILS # BLD AUTO: 3.89 X10(3)/MCL (ref 2.1–9.2)
NEUTROPHILS NFR BLD AUTO: 66.2 %
NITRITE UR QL STRIP.AUTO: NEGATIVE
NRBC BLD AUTO-RTO: 0 %
OSMOLALITY SERPL: 275 MOSM/KG (ref 280–300)
OSMOLALITY UR: 154 MOSM/KG (ref 300–1300)
PH UR STRIP.AUTO: 7 [PH]
PHOSPHATE SERPL-MCNC: 5 MG/DL (ref 2.3–4.7)
PLATELET # BLD AUTO: 238 X10(3)/MCL (ref 130–400)
PLATELETS.RETICULATED NFR BLD AUTO: 2.5 % (ref 0.9–11.2)
PMV BLD AUTO: 9.5 FL (ref 7.4–10.4)
POTASSIUM SERPL-SCNC: 4 MMOL/L (ref 3.5–5.1)
POTASSIUM SERPL-SCNC: 5.5 MMOL/L (ref 3.5–5.1)
PROT SERPL-MCNC: 6.6 GM/DL (ref 5.8–7.6)
PROT UR QL STRIP.AUTO: ABNORMAL
PTH-INTACT SERPL-MCNC: 320.5 PG/ML (ref 8.7–77)
RBC # BLD AUTO: 4.61 X10(6)/MCL (ref 4.7–6.1)
RBC #/AREA URNS AUTO: ABNORMAL /HPF
RBC UR QL AUTO: ABNORMAL
SODIUM SERPL-SCNC: 124 MMOL/L (ref 136–145)
SODIUM SERPL-SCNC: 130 MMOL/L (ref 136–145)
SODIUM UR-SCNC: 34 MMOL/L
SP GR UR STRIP.AUTO: 1 (ref 1–1.03)
SQUAMOUS #/AREA URNS LPF: ABNORMAL /HPF
TSH SERPL-ACNC: 1.79 UIU/ML (ref 0.35–4.94)
UROBILINOGEN UR STRIP-ACNC: NORMAL
WBC # SPEC AUTO: 5.88 X10(3)/MCL (ref 4.5–11.5)
WBC #/AREA URNS AUTO: ABNORMAL /HPF

## 2023-09-22 PROCEDURE — 83735 ASSAY OF MAGNESIUM: CPT | Performed by: EMERGENCY MEDICINE

## 2023-09-22 PROCEDURE — G0378 HOSPITAL OBSERVATION PER HR: HCPCS

## 2023-09-22 PROCEDURE — 85025 COMPLETE CBC W/AUTO DIFF WBC: CPT | Performed by: EMERGENCY MEDICINE

## 2023-09-22 PROCEDURE — 99285 EMERGENCY DEPT VISIT HI MDM: CPT | Mod: 25

## 2023-09-22 PROCEDURE — 80053 COMPREHEN METABOLIC PANEL: CPT | Performed by: EMERGENCY MEDICINE

## 2023-09-22 PROCEDURE — 63600175 PHARM REV CODE 636 W HCPCS

## 2023-09-22 PROCEDURE — 81001 URINALYSIS AUTO W/SCOPE: CPT | Performed by: EMERGENCY MEDICINE

## 2023-09-22 PROCEDURE — 90935 HEMODIALYSIS ONE EVALUATION: CPT

## 2023-09-22 PROCEDURE — 96372 THER/PROPH/DIAG INJ SC/IM: CPT

## 2023-09-22 PROCEDURE — 83930 ASSAY OF BLOOD OSMOLALITY: CPT | Performed by: INTERNAL MEDICINE

## 2023-09-22 PROCEDURE — 83970 ASSAY OF PARATHORMONE: CPT | Performed by: INTERNAL MEDICINE

## 2023-09-22 PROCEDURE — G0257 UNSCHED DIALYSIS ESRD PT HOS: HCPCS

## 2023-09-22 PROCEDURE — 84300 ASSAY OF URINE SODIUM: CPT

## 2023-09-22 PROCEDURE — 93005 ELECTROCARDIOGRAM TRACING: CPT

## 2023-09-22 PROCEDURE — 25000003 PHARM REV CODE 250

## 2023-09-22 PROCEDURE — 84100 ASSAY OF PHOSPHORUS: CPT | Performed by: EMERGENCY MEDICINE

## 2023-09-22 PROCEDURE — 84443 ASSAY THYROID STIM HORMONE: CPT | Performed by: INTERNAL MEDICINE

## 2023-09-22 PROCEDURE — 83935 ASSAY OF URINE OSMOLALITY: CPT | Performed by: INTERNAL MEDICINE

## 2023-09-22 RX ORDER — NALOXONE HCL 0.4 MG/ML
0.02 VIAL (ML) INJECTION
Status: DISCONTINUED | OUTPATIENT
Start: 2023-09-22 | End: 2023-09-23 | Stop reason: HOSPADM

## 2023-09-22 RX ORDER — ATORVASTATIN CALCIUM 40 MG/1
40 TABLET, FILM COATED ORAL DAILY
Status: DISCONTINUED | OUTPATIENT
Start: 2023-09-22 | End: 2023-09-23 | Stop reason: HOSPADM

## 2023-09-22 RX ORDER — GLUCAGON 1 MG
1 KIT INJECTION
Status: DISCONTINUED | OUTPATIENT
Start: 2023-09-22 | End: 2023-09-23 | Stop reason: HOSPADM

## 2023-09-22 RX ORDER — IBUPROFEN 200 MG
16 TABLET ORAL
Status: DISCONTINUED | OUTPATIENT
Start: 2023-09-22 | End: 2023-09-23 | Stop reason: HOSPADM

## 2023-09-22 RX ORDER — IBUPROFEN 200 MG
24 TABLET ORAL
Status: DISCONTINUED | OUTPATIENT
Start: 2023-09-22 | End: 2023-09-23 | Stop reason: HOSPADM

## 2023-09-22 RX ORDER — AMLODIPINE BESYLATE 10 MG/1
10 TABLET ORAL NIGHTLY
Status: DISCONTINUED | OUTPATIENT
Start: 2023-09-22 | End: 2023-09-23 | Stop reason: HOSPADM

## 2023-09-22 RX ORDER — SODIUM CHLORIDE 0.9 % (FLUSH) 0.9 %
10 SYRINGE (ML) INJECTION EVERY 12 HOURS PRN
Status: DISCONTINUED | OUTPATIENT
Start: 2023-09-22 | End: 2023-09-23 | Stop reason: HOSPADM

## 2023-09-22 RX ORDER — NAPROXEN SODIUM 220 MG/1
81 TABLET, FILM COATED ORAL DAILY
Status: DISCONTINUED | OUTPATIENT
Start: 2023-09-22 | End: 2023-09-23 | Stop reason: HOSPADM

## 2023-09-22 RX ORDER — HEPARIN SODIUM 5000 [USP'U]/ML
5000 INJECTION, SOLUTION INTRAVENOUS; SUBCUTANEOUS EVERY 12 HOURS
Status: DISCONTINUED | OUTPATIENT
Start: 2023-09-22 | End: 2023-09-23 | Stop reason: HOSPADM

## 2023-09-22 RX ORDER — CARVEDILOL 12.5 MG/1
25 TABLET ORAL 2 TIMES DAILY
Status: DISCONTINUED | OUTPATIENT
Start: 2023-09-22 | End: 2023-09-23 | Stop reason: HOSPADM

## 2023-09-22 RX ADMIN — HYDRALAZINE HYDROCHLORIDE 75 MG: 25 TABLET, FILM COATED ORAL at 10:09

## 2023-09-22 RX ADMIN — CARVEDILOL 25 MG: 12.5 TABLET, FILM COATED ORAL at 10:09

## 2023-09-22 RX ADMIN — ASPIRIN 81 MG: 81 TABLET, CHEWABLE ORAL at 03:09

## 2023-09-22 RX ADMIN — ATORVASTATIN CALCIUM 40 MG: 40 TABLET, FILM COATED ORAL at 05:09

## 2023-09-22 RX ADMIN — HYDRALAZINE HYDROCHLORIDE 75 MG: 25 TABLET, FILM COATED ORAL at 03:09

## 2023-09-22 RX ADMIN — HEPARIN SODIUM 5000 UNITS: 5000 INJECTION, SOLUTION INTRAVENOUS; SUBCUTANEOUS at 10:09

## 2023-09-22 RX ADMIN — AMLODIPINE BESYLATE 10 MG: 10 TABLET ORAL at 10:09

## 2023-09-22 NOTE — DISCHARGE SUMMARY
[facility] Discharge Summary    Admitting physician: Raffaele Almodovar MD  Attending physician: No att. providers found  Date of admit: 9/11/2023  Date of discharge: 9/21/2023    Condition: Stable  Outcome: Patient tolerated treatment/procedure well without complication and is now ready for discharge.  Disposition: Home or Self Care    Discharge Diagnoses     Principal Problem:  ARF (acute renal failure)    Patient Active Problem List   Diagnosis    ARF (acute renal failure)    Hypertensive emergency     Consultants and Procedures     Consultants:  Consults (From admission, onward)          Status Ordering Provider     Inpatient consult to Social Work/Case Management  Once        Provider:  (Not yet assigned)    Completed STEPAN MARTINEZ     Inpatient consult to Cardiology  Once        Provider:  Azam Banuelos MD    Completed DUANE CHAIREZ     Inpatient consult to Nephrology  Once        Provider:  Brittany Hyman MD    Completed DUANE CHAIREZ           Procedures:  Procedure(s) (LRB):  Insertion, Catheter, Central Venous, Hemodialysis (N/A)    Brief History of Present Illness      Ryann Ly is a 63 y.o. male with unknown PMH who presents to [facility]/11/2023 for episodes of shortness of breath occurring at night for the past week.  Patient states he has been having some productive cough with clearish-whitish sputum.  [place] ago, reports having been seen at an urgent care about a year ago and been started on some unspecified medication for an unspecified problem although has not been taking any medication for some time.  He does report having some reflux symptoms.  Patient denies any chest pain, N/V/D, abdominal pain, leg swelling, dizziness/headache, palpitations, fever, chills, urinary symptoms, hematuria, hemoptysis. Pt states he still makes adequate urine.  Denies any known sick contacts. Despite lab abnormalities patient appears very  comfortable, is asking how long he will be staying      In the ED BP was 230/130, with trop elevated at 0.298, BNP 1656. Normal lactate and TSH. Negative flu/covid.    Hospital Course with Pertinent Findings     Patient admitted for hypertensive emergency and placed on nitro drip with appropriate response but still remained hypertensive.  Patient was then started on Cardene drip with drastic improvement in blood pressure (/87).  CMP showed  and creatinine 12.  Nephrology consulted for evaluation of acute renal failure who recommended hemodialysis at that time.  Patient initially hesitant to perform hemodialysis but later agreed at which time a right internal jugular catheter was placed and hemodialysis was performed.  Cardiology consulted for management of NSTEMI and heart failure with a reduced ejection fraction in the setting of acute renal failure.  Indicated troponin elevation was likely secondary to demand ischemia but deferred Lexiscan to be performed as outpatient due to troponins downtrending and acuity, severity of patient's acute renal failure and hypertension.  Recommended initiating GDMT when transitioning to p.o. antihypertensive.  Patient was ultimately transitioned to carvedilol 12.5 mg b.i.d., amlodipine 10 mg q.d., hydralazine 75 mg t.i.d., and isosorbide dinitrate 20 mg t.i.d., but could not start ACE/ARB, MRA, and SGLT 2 inhibitors due to his acute renal failure. Initiated atorvastatin 40 mg q.d., calcitriol, and sevelamer at that time as well.  RIJ HD line removed and tunneled catheter placed by surgery.  Dialysis through a tunnel cath successful. Patient was discharged to home in stable condition.  Patient will need continued dialysis.  Patient to begin amlodipine 10 mg q.d., carvedilol 25 mg b.i.d., hydralazine 75 mg t.i.d., isosorbide dinitrate 20 mg t.i.d., sevelamer 800 mg t.i.d., calcitriol 0.25 mcg q.day, and aspirin 81 mg q.d..  Patient will follow-up in post [facility] for  "post hospitalization follow-up,  clinic for establishment with PCP, Nephrology for ESRD and dialysis management, and Cardiology for outpatient Lexiscan and possible right heart catheterization.    Objective     Vital Signs:  Vitals  BP: (!) 161/82  Temp: 97.6 °F (36.4 °C)  Temp Source: Oral  Pulse: 67  Resp: 18  SpO2: 98 %  Height: 5' 2" (157.5 cm)  Weight: 52.7 kg (116 lb 3.2 oz)    Physical Exam  Physical Exam  Vitals reviewed.   Constitutional:       General: He is not in acute distress.     Appearance: Normal appearance. He is normal weight. He is not ill-appearing.   HENT:      Head: Normocephalic and atraumatic.      Right Ear: External ear normal.      Left Ear: External ear normal.   Eyes:      General: No scleral icterus.        Right eye: No discharge.         Left eye: No discharge.      Extraocular Movements: Extraocular movements intact.      Conjunctiva/sclera: Conjunctivae normal.      Pupils: Pupils are equal, round, and reactive to light.   Cardiovascular:      Rate and Rhythm: Normal rate and regular rhythm.      Pulses: Normal pulses.      Heart sounds: Normal heart sounds. No murmur heard.     No friction rub. No gallop.   Pulmonary:      Effort: Pulmonary effort is normal. No respiratory distress.      Breath sounds: Normal breath sounds. No stridor. No wheezing, rhonchi or rales.   Abdominal:      General: Abdomen is flat. Bowel sounds are normal. There is no distension.      Palpations: Abdomen is soft.      Tenderness: There is no abdominal tenderness. There is no guarding.   Musculoskeletal:         General: No swelling, tenderness, deformity or signs of injury. Normal range of motion.      Right lower leg: No edema.      Left lower leg: No edema.   Skin:     General: Skin is warm and dry.      Capillary Refill: Capillary refill takes less than 2 seconds.      Coloration: Skin is not jaundiced.      Findings: No bruising, erythema or rash.   Neurological:      Mental Status: He is alert. "      Comments: AAO to person, place, time, and situation; CN II-XII grossly intact       Discharge Medications        Medication List        START taking these medications      amLODIPine 10 MG tablet  Commonly known as: NORVASC  Take 1 tablet (10 mg total) by mouth every evening.     aspirin 81 MG Chew  Take 1 tablet (81 mg total) by mouth once daily.     atorvastatin 40 MG tablet  Commonly known as: LIPITOR  Take 1 tablet (40 mg total) by mouth once daily.     calcitRIOL 0.25 MCG Cap  Commonly known as: ROCALTROL  Take 1 capsule (0.25 mcg total) by mouth every other day.  Start taking on: September 22, 2023     carvediloL 25 MG tablet  Commonly known as: COREG  Take 1 tablet (25 mg total) by mouth 2 (two) times daily.     hydrALAZINE 25 MG tablet  Commonly known as: APRESOLINE  Take 3 tablets (75 mg total) by mouth 3 (three) times daily.     isosorbide dinitrate 20 MG tablet  Commonly known as: ISORDIL  Take 1 tablet (20 mg total) by mouth 3 (three) times daily.     sevelamer carbonate 800 mg Tab  Commonly known as: RENVELA  Take 1 tablet (800 mg total) by mouth 3 (three) times daily with meals.               Where to Get Your Medications        These medications were sent to [address] [address]      Phone: [phone]   amLODIPine 10 MG tablet  aspirin 81 MG Chew  atorvastatin 40 MG tablet  calcitRIOL 0.25 MCG Cap  carvediloL 25 MG tablet  hydrALAZINE 25 MG tablet  isosorbide dinitrate 20 MG tablet  sevelamer carbonate 800 mg Tab       Discharge Information:     Mr. Ryann Ly is being discharged Home or Self Care.    Discharge Procedure Orders   Ambulatory referral/consult to Internal Medicine   Referral Priority: Routine Referral Type: Consultation   Referral Reason: Specialty Services Required Referral Location: [facility]   Requested Specialty: Internal Medicine   Number of Visits Requested: 1      Ambulatory referral/consult to Internal Medicine   Referral Priority: Routine Referral Type: Consultation   Referral Reason: Specialty Services Required   Requested Specialty: Internal Medicine   Number of Visits Requested: 1     Ambulatory referral/consult to Cardiology   Referral Priority: Routine Referral Type: Consultation   Referral Reason: Specialty Services Required   Referred to Provider: CHANDNI BANUELOS Requested Specialty: Cardiology   Number of Visits Requested: 1     Ambulatory referral/consult to Nephrology   Referral Priority: Routine Referral Type: Consultation   Referral Reason: Specialty Services Required   Referred to Provider: BRITTANY CORNEJO Requested Specialty: Nephrology   Number of Visits Requested: 1        Follow-Up Appointments:   Follow-up Information       [facility]. Schedule an appointment as soon as possible for a visit.               [facility] .    Specialty: Internal Medicine  Contact information:  [address] [facility] [address] [phone]             Brittany Cornejo MD .    Specialty: Nephrology  Contact information:  [address] [facility] [id number] [phone]               Chandni Banuelos MD .    Specialty: Cardiology  Contact information:  [address] [place] [id number] [phone]                             To address at follow-up:  -Recommend the following labs be drawn at follow up visit: BMP  -Recommend the following imaging studies be ordered at follow up visit: None    At this time, Ryann Ly is determined to have maximized benefits of inpatient hospitalization. Hospital course and discharge care plan has been discussed with patient at length, including prescriptions that were started this admission and prescriptions to be continued post discharge. All questions answered, and patient verbalized agreement with the plan of care. They were given return  precautions prior to discharge including alarm symptoms that should prompt return to ED or call to PCP.    TIME SPENT ON DISCHARGE: 60 minutes    Abner Weaver MD  [facility], PGY-1

## 2023-09-22 NOTE — CONSULTS
Nephrology Consultation    Date of Consultation:  September 22nd, 2023    Consultation Requested By:  Dr. Ponce    Reason for Consultation:  Hemodialysis needs while hospitalized.    History of Present Illness:  This is a 63 y.o. male [place] who has acute renal failure and possibly end-stage renal disease requiring hemodialysis during previous hospitalization patient required hemodialysis both for clearance and ultrafiltration purposes.  Patient is awaiting kidney biopsy the etiology is acute renal failure is unclear.  Patient had hypertensive emergency upon presentation at last hospitalization.  Patient did have tunneled catheter placed prior to discharge.  Patient presents to the ER today stating that he knows he needs dialysis and his legs are swelling.  Renal service consult for hemodialysis needs while hospitalized.    Review of patient's allergies indicates:  No Known Allergies    Review of systems: 12 point review of systems conducted, negative except as stated in the HPI.     Past Medical History:  Acute renal failure requiring hemodialysis, hypertensive urgency , secondary hyperparathyroidism and hyperphosphatemia  Past Medical History:   Diagnosis Date    Renal disorder        Procedure History:   Past Surgical History:   Procedure Laterality Date    INSERTION OF TUNNELED CENTRAL VENOUS HEMODIALYSIS CATHETER N/A 9/19/2023    Procedure: Insertion, Catheter, Central Venous, Hemodialysis;  Surgeon: Theodore Benitez MD;  Location[facility] CATH LAB;  Service: Peripheral Vascular;  Laterality: N/A;       Family History:  Family history reviewed with patient noncontributory    Social History:  reports that he has never smoked. He has never used smokeless tobacco. He reports that he does not drink alcohol and does not use drugs.    Home Medications:   (Not in a hospital admission)      Inpatient Medications:   No current facility-administered medications for this encounter.    Current Outpatient Medications:      amLODIPine (NORVASC) 10 MG tablet, Take 1 tablet (10 mg total) by mouth every evening., Disp: 90 tablet, Rfl: 3    aspirin 81 MG Chew, Take 1 tablet (81 mg total) by mouth once daily., Disp: 90 tablet, Rfl: 3    atorvastatin (LIPITOR) 40 MG tablet, Take 1 tablet (40 mg total) by mouth once daily., Disp: 90 tablet, Rfl: 3    calcitRIOL (ROCALTROL) 0.25 MCG Cap, Take 1 capsule (0.25 mcg total) by mouth every other day., Disp: 90 capsule, Rfl: 3    carvediloL (COREG) 25 MG tablet, Take 1 tablet (25 mg total) by mouth 2 (two) times daily., Disp: 60 tablet, Rfl: 11    hydrALAZINE (APRESOLINE) 25 MG tablet, Take 3 tablets (75 mg total) by mouth 3 (three) times daily., Disp: 270 tablet, Rfl: 11    isosorbide dinitrate (ISORDIL) 20 MG tablet, Take 1 tablet (20 mg total) by mouth 3 (three) times daily., Disp: 90 tablet, Rfl: 11    sevelamer carbonate (RENVELA) 800 mg Tab, Take 1 tablet (800 mg total) by mouth 3 (three) times daily with meals., Disp: 90 tablet, Rfl: 11     Laboratory Data:   Recent Results (from the past 24 hour(s))   Comprehensive metabolic panel    Collection Time: 09/22/23 11:21 AM   Result Value Ref Range    Sodium Level 124 (L) 136 - 145 mmol/L    Potassium Level 5.5 (H) 3.5 - 5.1 mmol/L    Chloride 94 (L) 98 - 107 mmol/L    Carbon Dioxide 20 (L) 23 - 31 mmol/L    Glucose Level 108 82 - 115 mg/dL    Blood Urea Nitrogen 43.3 (H) 8.4 - 25.7 mg/dL    Creatinine 8.35 (H) 0.73 - 1.18 mg/dL    Calcium Level Total 8.6 (L) 8.8 - 10.0 mg/dL    Protein Total 6.6 5.8 - 7.6 gm/dL    Albumin Level 3.0 (L) 3.4 - 4.8 g/dL    Globulin 3.6 (H) 2.4 - 3.5 gm/dL    Albumin/Globulin Ratio 0.8 (L) 1.1 - 2.0 ratio    Bilirubin Total 0.4 <=1.5 mg/dL    Alkaline Phosphatase 58 40 - 150 unit/L    Alanine Aminotransferase 31 0 - 55 unit/L    Aspartate Aminotransferase 25 5 - 34 unit/L    eGFR 7 mls/min/1.73/m2   Magnesium    Collection Time: 09/22/23 11:21 AM   Result Value Ref Range    Magnesium Level 2.10 1.60 - 2.60 mg/dL    Phosphorus    Collection Time: 09/22/23 11:21 AM   Result Value Ref Range    Phosphorus Level 5.0 (H) 2.3 - 4.7 mg/dL   CBC with Differential    Collection Time: 09/22/23 11:21 AM   Result Value Ref Range    WBC 5.88 4.50 - 11.50 x10(3)/mcL    RBC 4.61 (L) 4.70 - 6.10 x10(6)/mcL    Hgb 11.4 (L) 14.0 - 18.0 g/dL    Hct 35.6 (L) 42.0 - 52.0 %    MCV 77.2 (L) 80.0 - 94.0 fL    MCH 24.7 (L) 27.0 - 31.0 pg    MCHC 32.0 (L) 33.0 - 36.0 g/dL    RDW 14.8 11.5 - 17.0 %    Platelet 238 130 - 400 x10(3)/mcL    MPV 9.5 7.4 - 10.4 fL    IPF 2.5 0.9 - 11.2 %    Neut % 66.2 %    Lymph % 16.8 %    Mono % 11.7 %    Eos % 4.1 %    Basophil % 1.0 %    Lymph # 0.99 0.6 - 4.6 x10(3)/mcL    Neut # 3.89 2.1 - 9.2 x10(3)/mcL    Mono # 0.69 0.1 - 1.3 x10(3)/mcL    Eos # 0.24 0 - 0.9 x10(3)/mcL    Baso # 0.06 <=0.2 x10(3)/mcL    IG# 0.01 0 - 0.04 x10(3)/mcL    IG% 0.2 %    NRBC% 0.0 %       Imaging:  X-Ray Chest AP Portable   Final Result      Dialysis catheter insertion         Electronically signed by: Bulmaro Ambrosio   Date:    09/22/2023   Time:    12:40          Physical Exam:   BP (!) 145/90   Pulse 64   Temp 97.9 °F (36.6 °C) (Tympanic)   Resp 20   Wt 56 kg (123 lb 7.3 oz)   SpO2 98%   BMI 22.58 kg/m²  Body mass index is 22.58 kg/m².  General:  Alert and oriented person place time no acute distress  HEENT normocephalic atraumatic pupils equal round reactive to light and accommodation  Mouth:  No oral ulcers or lesions noted moist mucous membranes appreciated  Neck: No JVD bruit thyromegaly adenopathy appreciated  Lungs: Clear to auscultation bilaterally all fields  Cardiovascular regular rate and rhythm no murmur rub or gallop appreciated  Abdomen:  Positive bowel sounds all 4 quadrants soft nontender nondistended  Extremities:  No clubbing cyanosis noted 1+ edema of lower extremities bilaterally  Neurologic:  No clonus asterixis appreciated cranial nerves 2-12 grossly intact    Impression/Plan:  Acute renal failure with  multiple electrolyte abnormalities to include hyponatremia, hyperkalemia and metabolic acidosis.  Will dialyze patient for 3 hours today to correct metabolic derangement.  Will also dialyze for uremic toxin clearance and remove volume as tolerated.  Patient does have tunneled catheter and we did ask  to place patient outpatient hemodialysis unit but he is undocumented will not be able to go to a dialysis unit per their notes from last visit.  Hypertension:  Stable well controlled at this visit.  Continue 2 g sodium diet antihypertensive medication regimen.  Hyperphosphatemia:  Patient is on sevelamer will check phosphorus level.  Hyponatremia:  Mildly hypervolemic.  Possibly due to free water retention.  Should improve with free water clearance and hemodialysis.  Will monitor and will check TSH.  Will check serum and urine osmolality.  Secondary hyperparathyroidism of renal origin due to decreased hydroxylation of vitamin-D.  will check intact PTH level prescribe calcitriol if greater than 150.  Metabolic acidosis:  Should improve with hemodialysis use 35 bicarbonate bath.  Will recheck CMP to further assess acid-base status.  Thank you very much for your consultation    Manpreet Hyman M.D.  Nephrology

## 2023-09-22 NOTE — PROGRESS NOTES
C3 nurse attempted to contact [facility] Piero Ly for a TCC post hospital discharge follow up call using [place]  #[phone]. No answer. Left voicemail with callback information. The patient does not have a scheduled HOSFU appointment and no PCP listed. No messages routed at this time.

## 2023-09-22 NOTE — ED PROVIDER NOTES
Encounter Date: 9/22/2023       History     Chief Complaint   Patient presents with    Emergency Dialysis     PT STATES HERE FOR DIALYSIS, LAST TX ON MONDAY. RECENT DX OF BIA.  DENIES CP/SOB/EDEMA.  VOICES NO COMPLAINTS.  VSS.     He is here for follow-up after recent hospitalization.  Sammarinese audio  used, he also speaks some English.  Presented with hypertensive emergency and renal failure apparently acute on chronic although previous information unavailable.  Elevated troponin.  Seen by Internal Medicine, Cardiology, Nephrology, and surgery.  Successful treatment with parenteral antihypertensives and other medications as documented, dialysis via right upper chest tunneled catheter, responded well.  Last dialysis session 4 days ago, outpatient referrals placed for Cardiology, Internal Medicine, and Nephrology.  He does not yet have any definite appointments verified.  Reports compliant with medications, listed and reviewed.  He has no complaints, feels very well and is here to check on his dialysis status.  Denies dyspnea, chest pain, fever, orthopnea, or other complaints.  He does soft swelling in both legs which is not troubling him at present.  No problems with the catheter site.  No other complaints.    The history is provided by the patient and medical records. The history is limited by a language barrier. A  was used.     Review of patient's allergies indicates:  No Known Allergies  Past Medical History:   Diagnosis Date    Renal disorder      Past Surgical History:   Procedure Laterality Date    INSERTION OF TUNNELED CENTRAL VENOUS HEMODIALYSIS CATHETER N/A 9/19/2023    Procedure: Insertion, Catheter, Central Venous, Hemodialysis;  Surgeon: Theodore Benitez MD;  Location: [facility] CATH LAB;  Service: Peripheral Vascular;  Laterality: N/A;     History reviewed. No pertinent family history.  Social History     Tobacco Use    Smoking status: Never    Smokeless tobacco: Never    Substance Use Topics    Alcohol use: Never    Drug use: Never     Review of Systems   Constitutional:  Negative for chills and fever.   HENT:  Negative for congestion, facial swelling, nosebleeds and sinus pressure.    Eyes:  Negative for pain and redness.   Respiratory:  Negative for chest tightness, shortness of breath and wheezing.    Cardiovascular:  Negative for chest pain, palpitations and leg swelling.   Gastrointestinal:  Negative for abdominal distention, abdominal pain, diarrhea, nausea and vomiting.   Endocrine: Negative for cold intolerance, polydipsia and polyphagia.   Genitourinary:  Negative for difficulty urinating, dysuria, frequency and hematuria.   Musculoskeletal:  Negative for arthralgias, back pain, myalgias and neck pain.   Skin:  Negative for color change and rash.   Neurological:  Negative for dizziness, weakness, numbness and headaches.   Hematological:  Negative for adenopathy. Does not bruise/bleed easily.   Psychiatric/Behavioral:  Negative for agitation and behavioral problems.    All other systems reviewed and are negative.      Physical Exam     Initial Vitals [09/22/23 1037]   BP Pulse Resp Temp SpO2   116/73 61 16 97.9 °F (36.6 °C) 100 %      MAP       --         Physical Exam    Nursing note and vitals reviewed.  Constitutional: He appears well-developed and well-nourished. He is not diaphoretic. No distress.   HENT:   Head: Normocephalic and atraumatic.   Mouth/Throat: Oropharynx is clear and moist. No oropharyngeal exudate.   Eyes: Conjunctivae and EOM are normal. Pupils are equal, round, and reactive to light. Right eye exhibits no discharge. Left eye exhibits no discharge. No scleral icterus.   Neck: Neck supple. No thyromegaly present. No tracheal deviation present. No JVD present.   Normal range of motion.  Cardiovascular:  Normal rate, regular rhythm and normal heart sounds.     Exam reveals no gallop and no friction rub.       No murmur heard.  Pulmonary/Chest: Breath  sounds normal. No respiratory distress. He has no wheezes. He has no rhonchi. He has no rales. He exhibits no tenderness.   Right upper chest dialysis tunneled catheter sites look very good   Abdominal: Abdomen is soft. Bowel sounds are normal. He exhibits no distension and no mass. There is no abdominal tenderness. There is no rebound and no guarding.   Musculoskeletal:         General: Edema present. No tenderness. Normal range of motion.      Cervical back: Normal range of motion and neck supple.      Comments: 2+ pitting soft edema to upper calves     Lymphadenopathy:     He has no cervical adenopathy.   Neurological: He is alert and oriented to person, place, and time. He has normal strength. No cranial nerve deficit.   Skin: Skin is warm and dry. No rash noted. No erythema.   Psychiatric: He has a normal mood and affect. His behavior is normal. Judgment and thought content normal.         ED Course   Procedures  Labs Reviewed   COMPREHENSIVE METABOLIC PANEL - Abnormal; Notable for the following components:       Result Value    Sodium Level 124 (*)     Potassium Level 5.5 (*)     Chloride 94 (*)     Carbon Dioxide 20 (*)     Blood Urea Nitrogen 43.3 (*)     Creatinine 8.35 (*)     Calcium Level Total 8.6 (*)     Albumin Level 3.0 (*)     Globulin 3.6 (*)     Albumin/Globulin Ratio 0.8 (*)     All other components within normal limits   PHOSPHORUS - Abnormal; Notable for the following components:    Phosphorus Level 5.0 (*)     All other components within normal limits   CBC WITH DIFFERENTIAL - Abnormal; Notable for the following components:    RBC 4.61 (*)     Hgb 11.4 (*)     Hct 35.6 (*)     MCV 77.2 (*)     MCH 24.7 (*)     MCHC 32.0 (*)     All other components within normal limits   MAGNESIUM - Normal   CBC W/ AUTO DIFFERENTIAL    Narrative:     The following orders were created for panel order CBC auto differential.  Procedure                               Abnormality         Status                      ---------                               -----------         ------                     CBC with Differential[[id number]]       Abnormal            Final result                 Please view results for these tests on the individual orders.   URINALYSIS, REFLEX TO URINE CULTURE   EXTRA TUBES    Narrative:     The following orders were created for panel order EXTRA TUBES.  Procedure                               Abnormality         Status                     ---------                               -----------         ------                     Light Blue Top Hold[[id number]]                             In process                 Red Top Hold[[id number]]                                    In process                 Gold Top Hold[[id number]]                                   In process                 Pink Top Hold[[id number]]                                   In process                   Please view results for these tests on the individual orders.   LIGHT BLUE TOP HOLD   RED TOP HOLD   GOLD TOP HOLD   PINK TOP HOLD     EKG Readings: (Independently Interpreted)   Initial Reading: No STEMI. Rhythm: Normal Sinus Rhythm. Heart Rate: 67.   Normal sinus rhythm at 67 beats per minute, nonspecific intraventricular conduction delay, likely old anterolateral infarction.  No significant change from recent.     ECG Results              EKG 12-lead (Final result)  Result time 09/22/23 11:33:00      Final result by Interface, Lab In [facility] (09/22/23 11:33:00)                   Narrative:    Test Reason : N18.6,    Vent. Rate : 067 BPM     Atrial Rate : 067 BPM     P-R Int : 174 ms          QRS Dur : 142 ms      QT Int : 448 ms       P-R-T Axes : 050 157 035 degrees     QTc Int : 473 ms    Normal sinus rhythm  Left bundle branch block  Abnormal ECG  When compared with ECG of 18-SEP-2023 02:59,  No significant change was found  Confirmed by Vincenzo West MD ([id number]) on 9/22/2023 11:32:50 AM    Referred By: [email or web address]   SELF            Confirmed By:Vincenzo West MD                                  Imaging Results              X-Ray Chest AP Portable (In process)                     11:17 AM See notes - outpatient appointments arranged/ prioritized.    12:38 PM Consulted Internal Medicine.  Electrolyte imbalance worse than expected, expect he will need dialysis in the short term.  Coming to see in the ER.       Medications - No data to display  Medical Decision Making  Amount and/or Complexity of Data Reviewed  Labs: ordered.  Radiology: ordered.                               Clinical Impression:   Final diagnoses:  [N18.6] ESRD (end stage renal disease)  [E87.1] Hyponatremia (Primary)  [E87.5] Hyperkalemia        ED Disposition Condition    Observation Stable                Rodrigo Ponce MD  09/22/23 6119

## 2023-09-22 NOTE — LETTER
October 9, 2023         [address] [facility] [address]  Phone: [phone]  Fax: [phone]       Patient: Ryann Ly   YOB: 1960  Date of Visit: 10/09/2023    To Whom It May Concern:    Karen Ly  was at [facility] on 10/09/2023. The patient may return to work/school on 10/10/2023 with no restrictions. Patient needs his antihypertensive medications with him as he travels to [place]. If you have any questions or concerns, or if I can be of further assistance, please do not hesitate to contact me.    Sincerely,    Felix Sweeney MD

## 2023-09-22 NOTE — NURSING
09/22/23 1745   Post-Hemodialysis Assessment   Rinseback Volume (mL) 250 mL   Blood Volume Processed (Liters) 62.3 L   Dialyzer Clearance Clear   Duration of Treatment 180 minutes   Total UF (mL) 1500 mL   Patient Response to Treatment pt tolerated tx well   Post-Hemodialysis Comments NAD noted, VSS, Net UF 1500 mL

## 2023-09-22 NOTE — H&P
[facility] History and Physical     Patient Name: Ryann Ly  MRN: [id number]  Admission Date: 9/22/2023  Hospital Length of Stay: 0 days  Code Status: Full Code  Attending Provider: Bg Simons*  Primary Care Provider: Deb, Primary Doctor     Chief Complaint:   Emergency Dialysis (PT STATES HERE FOR DIALYSIS, LAST TX ON MONDAY. RECENT DX OF BIA.  DENIES CP/SOB/EDEMA.  VOICES NO COMPLAINTS.  VSS.)      Subjective:      Brief HPI:  Ryann Ly is a 63 y.o. male who  has a past medical history of Renal disorder.  He presented to [facility] on 9/22/2023  with a primary complaint of needing dialysis.  Patient recently discharged from hospital on 09/20/2023 when he presented for hypertensive emergency and acute renal failure.  Patient's hypertension was controlled with oral regimen.  Patient received right IJ tunneled cath for HD Monday Wednesday Friday.  Patient currently does not have insurance and is returning today to the ED to be evaluated for dialysis.  Patient endorsing lower extremity swelling.  Patient denies chest pain, shortness of breath, nausea, vomiting, diarrhea/constipation.  States that this is the best he has felt in a long time.  Has outpatient follow-up with Internal Medicine and Cardiology to establish care set up.  In ED, patient found to have sodium 124, potassium 5.5, phosphate 5 with acidosis.  Nephrology consulted for inpatient dialysis.    Patient family history is not on file.       Patient  has a past surgical history that includes Insertion of tunneled central venous hemodialysis catheter (N/A, 9/19/2023).    Patient has No Known Allergies.    Patient  reports that he has never smoked. He has never used smokeless tobacco. He reports that he does not drink alcohol and does not use drugs.     Current Outpatient Medications   Medication Instructions    amLODIPine (NORVASC) 10 mg, Oral, Nightly    aspirin 81 mg, Oral, Daily    atorvastatin (LIPITOR) 40 mg,  Oral, Daily    calcitRIOL (ROCALTROL) 0.25 mcg, Oral, Every other day    carvediloL (COREG) 25 mg, Oral, 2 times daily    hydrALAZINE (APRESOLINE) 75 mg, Oral, 3 times daily    isosorbide dinitrate (ISORDIL) 20 mg, Oral, 3 times daily    sevelamer carbonate (RENVELA) 800 mg, Oral, 3 times daily with meals          Review of Systems:  The remainder of the 14 point ROS is noncontributory or negative unless mentioned/reviewed above.     Objective:     Vital Signs:  Vital Signs (Most Recent):  Temp: 97.9 °F (36.6 °C) (09/22/23 1037)  Pulse: 67 (09/22/23 1432)  Resp: 19 (09/22/23 1432)  BP: (!) 163/96 (09/22/23 1431)  SpO2: 98 % (09/22/23 1432)  Body mass index is 22.58 kg/m².  Weight: 56 kg (123 lb 7.3 oz) Vital Signs (24h Range):  Temp:  [97.9 °F (36.6 °C)] 97.9 °F (36.6 °C)  Pulse:  [61-79] 67  Resp:  [11-25] 19  SpO2:  [93 %-100 %] 98 %  BP: (116-188)/() 163/96       Input/output:   No intake or output data in the 24 hours ending 09/22/23 1448    Physical Examination:  General:  Well developed, well nourished, no acute respiratory distress  Head: Normocephalic, atraumatic  Eyes: PERRL, EOMI, anicteric sclera  Throat: No posterior pharyngeal erythema or exudate, no tonsillar exudate  Neck: right IJ tunneled cath in place, supple, normal ROM, no JVD  CVS:  RRR, S1 and S2 normal, no murmurs, no added heart sounds, rubs, gallops, regular peripheral pulses, and no peripheral edema  Resp:  Lungs clear to auscultation bilaterally, no wheezes, rales, or rhonci  GI:  Abdomen soft, non-tender, non-distended, normoactive bowel sounds  MSK:  2+ BLE swelling.  Full range of motion, no obvious deformities   Skin:  No rashes, ulcers, erythema  Neuro:  Alert and oriented x3, No focal neuro deficits, CNII-XII grossly intact  Psych:  Appropriate mood and affect     Lines/Drains/Airways       Central Venous Catheter Line  Duration                  Hemodialysis Catheter 09/19/23 1104 right internal jugular 3 days                  "    Laboratory:    Recent Labs   Lab 09/22/23  1121   WBC 5.88   HGB 11.4*   HCT 35.6*      MCV 77.2*   RDW 14.8     No results for input(s): "TROPONINI", "CKTOTAL", "CKMB", "BNP" in the last 24 hours.  Recent Labs   Lab 09/18/23  0310   TROPONINI 0.071*     No results for input(s): "CHOL", "HDL", "LDLCALC", "TRIG", "CHOLHDL" in the last 168 hours. Recent Labs   Lab 09/22/23  1121   *   K 5.5*   CHLORIDE 94*   CO2 20*   BUN 43.3*   CREATININE 8.35*   CALCIUM 8.6*   MG 2.10   PHOS 5.0*     Recent Labs   Lab 09/22/23  1121   ALBUMIN 3.0*   BILITOT 0.4   AST 25   ALKPHOS 58   ALT 31     No results for input(s): "IRON", "TIBC", "FERRITIN", "SATURATEDIRO", "[id number]", "FOLATE" in the last 168 hours.  Recent Labs   Lab 09/22/23  1121   TSH 1.790              Other Results:  Estimated Creatinine Clearance: 7.2 mL/min (A) (based on SCr of 8.35 mg/dL (H)).    Current Medications:     Infusions:        Scheduled:   amLODIPine  10 mg Oral QHS    aspirin  81 mg Oral Daily    atorvastatin  40 mg Oral Daily    carvediloL  25 mg Oral BID    heparin (porcine)  5,000 Units Subcutaneous Q12H    hydrALAZINE  75 mg Oral TID         PRN:   dextrose 10%    dextrose 10%    glucagon (human recombinant)    glucose    glucose    naloxone    sodium chloride 0.9%        Microbiology Data:  Microbiology Results (last 7 days)       ** No results found for the last 168 hours. **             Antibiotics and Day Number of Therapy:  Antibiotics (From admission, onward)      None             Imaging:  X-Ray Chest AP Portable  Narrative: EXAMINATION:  XR CHEST AP PORTABLE    CLINICAL HISTORY:  Chest Pain;, .    COMPARISON:  September 12, 2023    FINDINGS:  No alveolar consolidation, effusion, or pneumothorax is seen.   The thoracic aorta is normal  cardiac silhouette, central pulmonary vessels and mediastinum are normal in size and are grossly unremarkable.   visualized osseous structures are grossly unremarkable..    Interval insertion " "of dialysis type catheter from a right jugular approach tip projecting at the right atrium  Impression: Dialysis catheter insertion    Electronically signed by: Bulmaro Ambrosio  Date:    09/22/2023  Time:    12:40        2D ECHO Results    Results for orders placed during the hospital encounter of 09/11/23    Echo    Interpretation Summary    Left Ventricle: The left ventricle is normal in size. Normal wall thickness. There is mildly reduced systolic function. Biplane (2D) method of discs ejection fraction is 45%.    There is mild  anteroseptal hypokinesis.    Left Atrium: Left atrium is mildly dilated.    Right Ventricle: Normal right ventricular cavity size. Systolic function is normal.    Right Atrium: Right atrium is mildly dilated.    Aortic Valve: The aortic valve is a trileaflet valve. There is normal leaflet mobility.    Mitral Valve: There is trace regurgitation.    Tricuspid Valve: There is mild to moderate regurgitation.    Pulmonary Artery: The estimated pulmonary artery systolic pressure is 79 mmHg.    IVC/SVC: Normal venous pressure at 3 mmHg.        Pulmonary Functions Testing Results:    No results found for: "FEV1", "FVC", "ZYN4PWP", "TLC", "DLCO"    Assessment & Plan:   Acute renal failure on hemodialysis  Hyperphosphatemia  Hyperkalemia  Hyponatremia  Metabolic acidosis  -patient with acute electrolyte and metabolic derangements  -recently started on hemodialysis  -will dialyze inpatient today and expect electrolyte levels to normalize  -urine osmolality and sodium and serum osmolality ordered to assess hyponatremia.  Suspect dilutional due to volume overload and will resolve after dialysis.  -EKG NSR  -recheck levels after dialysis treatment    Hypertension  -normotensive on admission with increased values upon recheck in 160s.  -amlodipine 10 mg, Coreg 25 mg b.i.d., hydralazine 75 mg t.i.d.    CODE STATUS: Full Code   Access: PIV  Antibiotics:  None  Diet: DIET RENAL ON DIALYSIS  DVT " Prophylaxis: Heparin 5k   GI Prophylaxis: none  Fluids:  None      Disposition: Fnu Piero Ly is a 63 y.o. male who is admitted for inpatient hemodialysis.  Patient stable with electrolyte derangements that should improve after dialysis session.      Belle Burgess MD  Internal Medicine Resident PGY-I  [facility] - Internal Medicine PGY-1  09/22/2023

## 2023-09-22 NOTE — LETTER
September 23, 2023         [address] [facility] [address]  Phone: [phone]  Fax: [phone]       Patient: Ryann Ly   YOB: 1960  Date of Visit: 09/23/2023    To Whom It May Concern:    Karen Ly  was at [facility] on 9/22/2023 - 9/23/2023. The patient will need wheelchair access when traveling in the airport. If you have any questions or concerns, or if I can be of further assistance, please do not hesitate to contact me.    Sincerely,          Belle Burgess MD

## 2023-09-23 VITALS
WEIGHT: 114.63 LBS | HEIGHT: 62 IN | RESPIRATION RATE: 18 BRPM | BODY MASS INDEX: 21.1 KG/M2 | TEMPERATURE: 98 F | DIASTOLIC BLOOD PRESSURE: 76 MMHG | SYSTOLIC BLOOD PRESSURE: 117 MMHG | OXYGEN SATURATION: 99 % | HEART RATE: 66 BPM

## 2023-09-23 PROBLEM — N18.6 ESRD (END STAGE RENAL DISEASE): Status: ACTIVE | Noted: 2023-09-23

## 2023-09-23 LAB
ALBUMIN SERPL-MCNC: 2.4 G/DL (ref 3.4–4.8)
ALBUMIN/GLOB SERPL: 0.8 RATIO (ref 1.1–2)
ALP SERPL-CCNC: 53 UNIT/L (ref 40–150)
ALT SERPL-CCNC: 25 UNIT/L (ref 0–55)
AST SERPL-CCNC: 21 UNIT/L (ref 5–34)
BASOPHILS # BLD AUTO: 0.05 X10(3)/MCL
BASOPHILS NFR BLD AUTO: 0.9 %
BILIRUB SERPL-MCNC: 0.4 MG/DL
BUN SERPL-MCNC: 18.9 MG/DL (ref 8.4–25.7)
CALCIUM SERPL-MCNC: 8.1 MG/DL (ref 8.8–10)
CHLORIDE SERPL-SCNC: 98 MMOL/L (ref 98–107)
CO2 SERPL-SCNC: 24 MMOL/L (ref 23–31)
CREAT SERPL-MCNC: 5.53 MG/DL (ref 0.73–1.18)
EOSINOPHIL # BLD AUTO: 0.28 X10(3)/MCL (ref 0–0.9)
EOSINOPHIL NFR BLD AUTO: 5.3 %
ERYTHROCYTE [DISTWIDTH] IN BLOOD BY AUTOMATED COUNT: 14.6 % (ref 11.5–17)
GFR SERPLBLD CREATININE-BSD FMLA CKD-EPI: 11 MLS/MIN/1.73/M2
GLOBULIN SER-MCNC: 3 GM/DL (ref 2.4–3.5)
GLUCOSE SERPL-MCNC: 86 MG/DL (ref 82–115)
HCT VFR BLD AUTO: 35.2 % (ref 42–52)
HGB BLD-MCNC: 11.3 G/DL (ref 14–18)
IMM GRANULOCYTES # BLD AUTO: 0.02 X10(3)/MCL (ref 0–0.04)
IMM GRANULOCYTES NFR BLD AUTO: 0.4 %
LYMPHOCYTES # BLD AUTO: 1.52 X10(3)/MCL (ref 0.6–4.6)
LYMPHOCYTES NFR BLD AUTO: 28.6 %
MAGNESIUM SERPL-MCNC: 2 MG/DL (ref 1.6–2.6)
MCH RBC QN AUTO: 24.5 PG (ref 27–31)
MCHC RBC AUTO-ENTMCNC: 32.1 G/DL (ref 33–36)
MCV RBC AUTO: 76.2 FL (ref 80–94)
MONOCYTES # BLD AUTO: 0.94 X10(3)/MCL (ref 0.1–1.3)
MONOCYTES NFR BLD AUTO: 17.7 %
NEUTROPHILS # BLD AUTO: 2.5 X10(3)/MCL (ref 2.1–9.2)
NEUTROPHILS NFR BLD AUTO: 47.1 %
NRBC BLD AUTO-RTO: 0 %
PHOSPHATE SERPL-MCNC: 4.6 MG/DL (ref 2.3–4.7)
PLATELET # BLD AUTO: 292 X10(3)/MCL (ref 130–400)
PMV BLD AUTO: 8.9 FL (ref 7.4–10.4)
POTASSIUM SERPL-SCNC: 4.2 MMOL/L (ref 3.5–5.1)
PROT SERPL-MCNC: 5.4 GM/DL (ref 5.8–7.6)
RBC # BLD AUTO: 4.62 X10(6)/MCL (ref 4.7–6.1)
SODIUM SERPL-SCNC: 130 MMOL/L (ref 136–145)
WBC # SPEC AUTO: 5.31 X10(3)/MCL (ref 4.5–11.5)

## 2023-09-23 PROCEDURE — 96372 THER/PROPH/DIAG INJ SC/IM: CPT

## 2023-09-23 PROCEDURE — 80053 COMPREHEN METABOLIC PANEL: CPT

## 2023-09-23 PROCEDURE — 83735 ASSAY OF MAGNESIUM: CPT

## 2023-09-23 PROCEDURE — 84100 ASSAY OF PHOSPHORUS: CPT

## 2023-09-23 PROCEDURE — 85025 COMPLETE CBC W/AUTO DIFF WBC: CPT

## 2023-09-23 PROCEDURE — 94761 N-INVAS EAR/PLS OXIMETRY MLT: CPT

## 2023-09-23 PROCEDURE — 63600175 PHARM REV CODE 636 W HCPCS

## 2023-09-23 PROCEDURE — G0378 HOSPITAL OBSERVATION PER HR: HCPCS

## 2023-09-23 PROCEDURE — 25000003 PHARM REV CODE 250

## 2023-09-23 RX ADMIN — CARVEDILOL 25 MG: 12.5 TABLET, FILM COATED ORAL at 08:09

## 2023-09-23 RX ADMIN — HYDRALAZINE HYDROCHLORIDE 75 MG: 25 TABLET, FILM COATED ORAL at 08:09

## 2023-09-23 RX ADMIN — HEPARIN SODIUM 5000 UNITS: 5000 INJECTION, SOLUTION INTRAVENOUS; SUBCUTANEOUS at 08:09

## 2023-09-23 RX ADMIN — ASPIRIN 81 MG: 81 TABLET, CHEWABLE ORAL at 08:09

## 2023-09-23 NOTE — DISCHARGE SUMMARY
[facility] Internal Medicine Discharge Summary    Admitting Physician: Parker Simons MD  Attending Physician: Bg Simons*  Date of Admit: 9/22/2023  Date of Discharge: 9/23/2023    Condition: Stable  Outcome: Patient tolerated treatment/procedure well without complication and is now ready for discharge.  DISPOSITION: Home or Self Care          Discharge Diagnoses     Patient Active Problem List   Diagnosis    ARF (acute renal failure)    Hypertensive emergency    ESRD (end stage renal disease)       Principal Problem:  ESRD (end stage renal disease)    Consultants and Procedures     Consultants:  IP CONSULT TO INTERNAL MEDICINE  IP CONSULT TO NEPHROLOGY    Procedures:   * No surgery found *     Brief Admission History      Ryann Ly is a 63 y.o. male who  has a past medical history of Renal disorder.  He presented to [facility] on 9/22/2023  with a primary complaint of needing dialysis.  Patient recently discharged from hospital on 09/20/2023 when he presented for hypertensive emergency and acute renal failure.  Patient's hypertension was controlled with oral regimen.  Patient received right IJ tunneled cath for HD Monday Wednesday Friday.  Patient currently does not have insurance and is returning today to the ED to be evaluated for dialysis.  Patient endorsing lower extremity swelling.  Patient denies chest pain, shortness of breath, nausea, vomiting, diarrhea/constipation.  States that this is the best he has felt in a long time.  Has outpatient follow-up with Internal Medicine and Cardiology to establish care set up.    Hospital Course with Pertinent Findings     Patient with electrolyte derangements and mildly volume overloaded.  Received dialysis while inpatient and tolerated well.  Repeat labs in a.m. showed resolution of derangements.  Patient with no other complaints and feeling well.  Patient is undocumented and can not be set up for outpatient dialysis at this time.  Case management  "working to assist patient with this issue.  Told to return to ED if symptoms worsen.  Patient stable for discharge.    Discharge physical exam:  Vitals  BP: 117/76  Temp: 97.7 °F (36.5 °C)  Temp Source: Oral  Pulse: 66  Resp: 18  SpO2: 99 %  Height: 5' 2" (157.5 cm)  Weight: 52 kg (114 lb 10.2 oz)    Physical Examination:  General:  Well developed, well nourished, no acute respiratory distress  Head: Normocephalic, atraumatic  Eyes: PERRL, EOMI, anicteric sclera  Throat: No posterior pharyngeal erythema or exudate, no tonsillar exudate  Neck: right IJ tunneled cath in place, supple, normal ROM, no JVD  CVS:  RRR, S1 and S2 normal, no murmurs, no added heart sounds, rubs, gallops, regular peripheral pulses, and no peripheral edema  Resp:  Lungs clear to auscultation bilaterally, no wheezes, rales, or rhonci  GI:  Abdomen soft, non-tender, non-distended, normoactive bowel sounds  MSK:  1+ BLE swelling.  Full range of motion, no obvious deformities   Skin:  No rashes, ulcers, erythema  Neuro:  Alert and oriented x3, No focal neuro deficits, CNII-XII grossly intact  Psych:  Appropriate mood and affect     TIME SPENT ON DISCHARGE: 60 minutes    Discharge Medications        Medication List        CONTINUE taking these medications      amLODIPine 10 MG tablet  Commonly known as: NORVASC  Take 1 tablet (10 mg total) by mouth every evening.     aspirin 81 MG Chew  Take 1 tablet (81 mg total) by mouth once daily.     atorvastatin 40 MG tablet  Commonly known as: LIPITOR  Take 1 tablet (40 mg total) by mouth once daily.     calcitRIOL 0.25 MCG Cap  Commonly known as: ROCALTROL  Take 1 capsule (0.25 mcg total) by mouth every other day.     carvediloL 25 MG tablet  Commonly known as: COREG  Take 1 tablet (25 mg total) by mouth 2 (two) times daily.     hydrALAZINE 25 MG tablet  Commonly known as: APRESOLINE  Take 3 tablets (75 mg total) by mouth 3 (three) times daily.     isosorbide dinitrate 20 MG tablet  Commonly known as: " ISORDIL  Take 1 tablet (20 mg total) by mouth 3 (three) times daily.     sevelamer carbonate 800 mg Tab  Commonly known as: RENVELA  Take 1 tablet (800 mg total) by mouth 3 (three) times daily with meals.              Discharge Information:   Fnu Piero Ly is being discharged .    No discharge procedures on file.     Follow-Up Appointments:      To address at follow-up:  -The following labs are to be drawn at the [facility] visit: cbc and cmp      The above information was discussed with the patient in clear terms. Patient was able to repeat the instructions to me in their own words. All questions answered. ED precautions provided.    Belle Burgess MD  Internal Medicine Resident PGY-I

## 2023-09-23 NOTE — PLAN OF CARE
Problem: Device-Related Complication Risk (Hemodialysis)  Goal: Safe, Effective Therapy Delivery  Outcome: Ongoing, Progressing     Problem: Hemodynamic Instability (Hemodialysis)  Goal: Effective Tissue Perfusion  Outcome: Ongoing, Progressing     Problem: Infection (Hemodialysis)  Goal: Absence of Infection Signs and Symptoms  Outcome: Ongoing, Progressing     Problem: Adult Inpatient Plan of Care  Goal: Plan of Care Review  Outcome: Ongoing, Progressing  Goal: Patient-Specific Goal (Individualized)  Outcome: Ongoing, Progressing  Goal: Absence of Hospital-Acquired Illness or Injury  Outcome: Ongoing, Progressing  Goal: Optimal Comfort and Wellbeing  Outcome: Ongoing, Progressing  Goal: Readiness for Transition of Care  Outcome: Ongoing, Progressing     Problem: Infection  Goal: Absence of Infection Signs and Symptoms  Outcome: Ongoing, Progressing     Problem: Fluid and Electrolyte Imbalance (Acute Kidney Injury/Impairment)  Goal: Fluid and Electrolyte Balance  Outcome: Ongoing, Progressing     Problem: Oral Intake Inadequate (Acute Kidney Injury/Impairment)  Goal: Optimal Nutrition Intake  Outcome: Ongoing, Progressing     Problem: Renal Function Impairment (Acute Kidney Injury/Impairment)  Goal: Effective Renal Function  Outcome: Ongoing, Progressing

## 2023-09-23 NOTE — PROGRESS NOTES
"[facility]  Nephrology Progress Note  Patient Name: Ryann Ly  Age: 63 y.o.  : 1960  MRN: [id number]  Admission Date: 2023    Chief complaint: Emergency Dialysis (PT STATES HERE FOR DIALYSIS, LAST TX ON MONDAY. RECENT DX OF BIA.  DENIES CP/SOB/EDEMA.  VOICES NO COMPLAINTS.  VSS.)    Hospital course  Ryann Ly is a 63 y.o. Other male with past medical history of hypertension, acute kidney injury (hemodialysis dependent), CKD-MBD, anemia of chronic disease.  Patient presented to the emergency department on 2023 with complaints of edema.  Laboratory results revealed multiple electrolyte abnormalities, patient was admitted to medicine service, Nephrology was consulted for assistance with management of ESRD, patient was dialyzed on the evening of 2023.    Subjective  Patient denies any complaints, hemodialysis treatment was uneventful yesterday.      Review of Systems  Cardiovascular:  Negative for chest pain   Respiratory: Negative for shortness of breath     Objective  /62   Pulse 73   Temp 98.4 °F (36.9 °C) (Oral)   Resp 18   Ht 5' 2" (1.575 m)   Wt 52 kg (114 lb 10.2 oz)   SpO2 98%   BMI 20.97 kg/m²     Intake/Output Summary (Last 24 hours) at 2023 0557  Last data filed at 2023 1745  Gross per 24 hour   Intake --   Output 1500 ml   Net -1500 ml       Physical Exam  General appearance: Patient is in no acute distress.  Skin: No rashes or wounds.  HEENT: PERRLA, EOMI, no scleral icterus, no JVD. Neck is supple.  Right chest wall tunneled dialysis catheter is place, exit site without signs of infection.  Chest: Respirations are unlabored. Lungs sounds are clear.   Heart: S1, S2.   Abdomen: Benign.  : Deferred.  Extremities: No edema, peripheral pulses are palpable.   Neuro: No focal deficits.     Medications    Current Facility-Administered Medications:     amLODIPine tablet 10 mg, 10 mg, Oral, QHS, Belle Burgess MD, 10 mg at " 09/22/23 2204    aspirin chewable tablet 81 mg, 81 mg, Oral, Daily, Belle Burgess MD, 81 mg at 09/22/23 1557    atorvastatin tablet 40 mg, 40 mg, Oral, Daily, Belle Burgess MD, 40 mg at 09/22/23 1721    carvediloL tablet 25 mg, 25 mg, Oral, BID, Belle Burgess MD, 25 mg at 09/22/23 2204    dextrose 10% bolus 125 mL 125 mL, 12.5 g, Intravenous, PRN, Belle Burgess MD    dextrose 10% bolus 250 mL 250 mL, 25 g, Intravenous, PRN, Belle Burgess MD    glucagon (human recombinant) injection 1 mg, 1 mg, Intramuscular, PRN, Belle Burgess MD    glucose chewable tablet 16 g, 16 g, Oral, PRN, Belle Burgess MD    glucose chewable tablet 24 g, 24 g, Oral, PRN, Belle Burgess MD    heparin (porcine) injection 5,000 Units, 5,000 Units, Subcutaneous, Q12H, Belle Burgess MD, 5,000 Units at 09/22/23 2203    hydrALAZINE tablet 75 mg, 75 mg, Oral, TID, Belle Burgess MD, 75 mg at 09/22/23 2204    naloxone 0.4 mg/mL injection 0.02 mg, 0.02 mg, Intravenous, PRN, Belle Burgess MD    sodium chloride 0.9% flush 10 mL, 10 mL, Intravenous, Q12H PRN, Belle Burgess MD     Imaging:    Reviewed    Laboratory Data:  Hematology  Lab Results   Component Value Date    WBC 5.88 09/22/2023    HGB 11.4 (L) 09/22/2023    HCT 35.6 (L) 09/22/2023     09/22/2023     (L) 09/22/2023    K 4.0 09/22/2023    CHLORIDE 95 (L) 09/22/2023    CO2 28 09/22/2023    BUN 15.3 09/22/2023    CREATININE 4.38 (H) 09/22/2023    EGFRNORACEVR 15 09/22/2023    GLUCOSE 131 (H) 09/22/2023    CALCIUM 8.3 (L) 09/22/2023    ALKPHOS 58 09/22/2023    LABPROT 6.6 09/22/2023    ALBUMIN 3.0 (L) 09/22/2023    AST 25 09/22/2023    ALT 31 09/22/2023    MG 2.10 09/22/2023    PHOS 5.0 (H) 09/22/2023     Lab Results   Component Value Date    .5 (H) 09/22/2023    NFGNFIST56CH 18.9 (L) 09/15/2023       Lab Results   Component Value Date    IRON 63 (L) 09/11/2023    TIBC 197 (L) 09/11/2023    TRANS 167 (L) 09/11/2023    LABIRON 32 09/11/2023    FERRITIN  487.55 (H) 09/11/2023    HAPTO 204 09/11/2023       Lab Results   Component Value Date    HIV Nonreactive 09/16/2023    HEPCAB Nonreactive 09/15/2023    HEPBSURFAG Nonreactive 09/12/2023    HEPBSAB Nonreactive 09/12/2023         Impression  Acute kidney injury   Underlying chronic kidney disease   Volume overload   Anemia of chronic disease  Hyponatremia, improved   Hyperkalemia, resolved   Metabolic acidosis, resolved   Hypertension   CKD-MBD    Plan  Patient's condition has improved since admission, electrolyte and acid-base abnormalites have largely been corrected, hypertension is well controlled.  There are no indications for hemodialysis at this time.  Continue dialysis on an as-needed basis.  Disposition per primary service.      Breanna Webster NP  [facility]   9/23/2023

## 2023-09-25 ENCOUNTER — HOSPITAL ENCOUNTER (OUTPATIENT)
Facility: HOSPITAL | Age: 63
Discharge: HOME | End: 2023-09-25
Attending: STUDENT IN AN ORGANIZED HEALTH CARE EDUCATION/TRAINING PROGRAM | Admitting: STUDENT IN AN ORGANIZED HEALTH CARE EDUCATION/TRAINING PROGRAM

## 2023-09-25 VITALS
HEART RATE: 66 BPM | OXYGEN SATURATION: 96 % | TEMPERATURE: 97 F | BODY MASS INDEX: 22.15 KG/M2 | HEIGHT: 62 IN | RESPIRATION RATE: 19 BRPM | SYSTOLIC BLOOD PRESSURE: 135 MMHG | WEIGHT: 120.38 LBS | DIASTOLIC BLOOD PRESSURE: 80 MMHG

## 2023-09-25 DIAGNOSIS — N18.6 ESRD ON DIALYSIS: ICD-10-CM

## 2023-09-25 DIAGNOSIS — Z99.2 ESRD (END STAGE RENAL DISEASE) ON DIALYSIS: Primary | ICD-10-CM

## 2023-09-25 DIAGNOSIS — R06.02 SHORTNESS OF BREATH: ICD-10-CM

## 2023-09-25 DIAGNOSIS — Z99.2 ESRD ON DIALYSIS: ICD-10-CM

## 2023-09-25 DIAGNOSIS — E87.70 HYPERVOLEMIA, UNSPECIFIED HYPERVOLEMIA TYPE: Primary | ICD-10-CM

## 2023-09-25 DIAGNOSIS — N18.6 ESRD (END STAGE RENAL DISEASE) ON DIALYSIS: Primary | ICD-10-CM

## 2023-09-25 LAB
ALBUMIN SERPL-MCNC: 2.8 G/DL (ref 3.4–4.8)
ALBUMIN/GLOB SERPL: 0.9 RATIO (ref 1.1–2)
ALP SERPL-CCNC: 52 UNIT/L (ref 40–150)
ALT SERPL-CCNC: 19 UNIT/L (ref 0–55)
ANION GAP SERPL CALC-SCNC: 12 MEQ/L
AST SERPL-CCNC: 14 UNIT/L (ref 5–34)
BASOPHILS # BLD AUTO: 0.06 X10(3)/MCL
BASOPHILS NFR BLD AUTO: 1.1 %
BILIRUB SERPL-MCNC: 0.3 MG/DL
BNP BLD-MCNC: 598.8 PG/ML
BUN SERPL-MCNC: 13.9 MG/DL (ref 8.4–25.7)
BUN SERPL-MCNC: 39.3 MG/DL (ref 8.4–25.7)
CALCIUM SERPL-MCNC: 8.3 MG/DL (ref 8.8–10)
CALCIUM SERPL-MCNC: 8.9 MG/DL (ref 8.8–10)
CHLORIDE SERPL-SCNC: 100 MMOL/L (ref 98–107)
CHLORIDE SERPL-SCNC: 91 MMOL/L (ref 98–107)
CO2 SERPL-SCNC: 21 MMOL/L (ref 23–31)
CO2 SERPL-SCNC: 23 MMOL/L (ref 23–31)
CREAT SERPL-MCNC: 3.95 MG/DL (ref 0.73–1.18)
CREAT SERPL-MCNC: 8.19 MG/DL (ref 0.73–1.18)
CREAT/UREA NIT SERPL: 4
EOSINOPHIL # BLD AUTO: 0.18 X10(3)/MCL (ref 0–0.9)
EOSINOPHIL NFR BLD AUTO: 3.4 %
ERYTHROCYTE [DISTWIDTH] IN BLOOD BY AUTOMATED COUNT: 14.3 % (ref 11.5–17)
GFR SERPLBLD CREATININE-BSD FMLA CKD-EPI: 16 MLS/MIN/1.73/M2
GFR SERPLBLD CREATININE-BSD FMLA CKD-EPI: 7 MLS/MIN/1.73/M2
GLOBULIN SER-MCNC: 3 GM/DL (ref 2.4–3.5)
GLUCOSE SERPL-MCNC: 116 MG/DL (ref 82–115)
GLUCOSE SERPL-MCNC: 88 MG/DL (ref 82–115)
HCT VFR BLD AUTO: 31.7 % (ref 42–52)
HGB BLD-MCNC: 10.3 G/DL (ref 14–18)
IMM GRANULOCYTES # BLD AUTO: 0.02 X10(3)/MCL (ref 0–0.04)
IMM GRANULOCYTES NFR BLD AUTO: 0.4 %
LYMPHOCYTES # BLD AUTO: 0.91 X10(3)/MCL (ref 0.6–4.6)
LYMPHOCYTES NFR BLD AUTO: 17.3 %
MCH RBC QN AUTO: 24.8 PG (ref 27–31)
MCHC RBC AUTO-ENTMCNC: 32.5 G/DL (ref 33–36)
MCV RBC AUTO: 76.4 FL (ref 80–94)
MONOCYTES # BLD AUTO: 0.79 X10(3)/MCL (ref 0.1–1.3)
MONOCYTES NFR BLD AUTO: 15 %
NEUTROPHILS # BLD AUTO: 3.3 X10(3)/MCL (ref 2.1–9.2)
NEUTROPHILS NFR BLD AUTO: 62.8 %
NRBC BLD AUTO-RTO: 0 %
PLATELET # BLD AUTO: 272 X10(3)/MCL (ref 130–400)
PMV BLD AUTO: 9.2 FL (ref 7.4–10.4)
POTASSIUM SERPL-SCNC: 3.7 MMOL/L (ref 3.5–5.1)
POTASSIUM SERPL-SCNC: 4.7 MMOL/L (ref 3.5–5.1)
PROT SERPL-MCNC: 5.8 GM/DL (ref 5.8–7.6)
RBC # BLD AUTO: 4.15 X10(6)/MCL (ref 4.7–6.1)
SODIUM SERPL-SCNC: 123 MMOL/L (ref 136–145)
SODIUM SERPL-SCNC: 133 MMOL/L (ref 136–145)
TROPONIN I SERPL-MCNC: 0.02 NG/ML (ref 0–0.04)
WBC # SPEC AUTO: 5.26 X10(3)/MCL (ref 4.5–11.5)

## 2023-09-25 PROCEDURE — G0378 HOSPITAL OBSERVATION PER HR: HCPCS

## 2023-09-25 PROCEDURE — 93005 ELECTROCARDIOGRAM TRACING: CPT

## 2023-09-25 PROCEDURE — 80053 COMPREHEN METABOLIC PANEL: CPT | Performed by: STUDENT IN AN ORGANIZED HEALTH CARE EDUCATION/TRAINING PROGRAM

## 2023-09-25 PROCEDURE — 85025 COMPLETE CBC W/AUTO DIFF WBC: CPT | Performed by: STUDENT IN AN ORGANIZED HEALTH CARE EDUCATION/TRAINING PROGRAM

## 2023-09-25 PROCEDURE — G0257 UNSCHED DIALYSIS ESRD PT HOS: HCPCS

## 2023-09-25 PROCEDURE — 84484 ASSAY OF TROPONIN QUANT: CPT | Performed by: STUDENT IN AN ORGANIZED HEALTH CARE EDUCATION/TRAINING PROGRAM

## 2023-09-25 PROCEDURE — 83880 ASSAY OF NATRIURETIC PEPTIDE: CPT | Performed by: STUDENT IN AN ORGANIZED HEALTH CARE EDUCATION/TRAINING PROGRAM

## 2023-09-25 PROCEDURE — 99285 EMERGENCY DEPT VISIT HI MDM: CPT | Mod: 25

## 2023-09-25 RX ORDER — SODIUM CHLORIDE 0.9 % (FLUSH) 0.9 %
10 SYRINGE (ML) INJECTION
Status: DISCONTINUED | OUTPATIENT
Start: 2023-09-25 | End: 2023-09-25 | Stop reason: HOSPADM

## 2023-09-25 RX ORDER — CALCIUM GLUCONATE 20 MG/ML
1 INJECTION, SOLUTION INTRAVENOUS ONCE
Status: DISCONTINUED | OUTPATIENT
Start: 2023-09-25 | End: 2023-09-25

## 2023-09-25 NOTE — PLAN OF CARE
09/25/23 1358   Discharge Assessment   Assessment Type Discharge Planning Assessment   Confirmed/corrected address, phone number and insurance Yes   Confirmed Demographics Correct on Facesheet   Source of Information health record;patient   When was your last doctors appointment?   (Does not have a PCP)   Does patient/caregiver understand observation status Yes   Communicated YOHANNES with patient/caregiver Date not available/Unable to determine   Reason For Admission SOB; ESRD; Hypervolemia   People in Home friend(s)   Facility Arrived From: Home   Do you expect to return to your current living situation? Yes   Do you have help at home or someone to help you manage your care at home? Yes   Who are your caregiver(s) and their phone number(s)? valentino Rudolph, P: [phone]   Prior to hospitilization cognitive status: Alert/Oriented   Current cognitive status: Alert/Oriented   Equipment Currently Used at Home none   Readmission within 30 days? Yes   Patient currently being followed by outpatient case management? No   Do you currently have service(s) that help you manage your care at home? No   Do you take prescription medications? Yes   Do you have prescription coverage? No   Do you have any problems affording any of your prescribed medications? TBD   Who is going to help you get home at discharge? Friend   How do you get to doctors appointments? family or friend will provide   Are you on dialysis? Yes   Dialysis Name and Scheduled days Patient comes to ED at [facility]   Do you take coumadin? 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

## 2023-09-25 NOTE — NURSING
09/25/23 1623 09/25/23 1625        Hemodialysis Catheter right subclavian   No placement date or time found.   Present Prior to Hospital Arrival?: Yes  Hand Hygiene: Performed  Location: right subclavian   Dressing Intervention  --  Sterile dressing change   Post-Hemodialysis Assessment   Total UF (mL) 2000 mL  --    Post-Hemodialysis Comments 3hr. 2000ml NET.  vss.  changed hd cvc dressing.  --

## 2023-09-25 NOTE — DISCHARGE SUMMARY
[facility] Internal Medicine Discharge Summary    Admitting Physician: Maxi Webster MD  Attending Physician: Maxi Webster MD  Date of Admit: 9/25/2023  Date of Discharge: 9/25/2023    Condition: Good  Outcome: Patient tolerated treatment/procedure well without complication and is now ready for discharge.  DISPOSITION: Home or Self Care          Discharge Diagnoses     Patient Active Problem List   Diagnosis    ARF (acute renal failure)    Hypertensive emergency    ESRD (end stage renal disease)       Principal Problem:  <principal problem not specified>    Consultants and Procedures     Consultants:  IP CONSULT TO INTERNAL MEDICINE  IP CONSULT TO NEPHROLOGY    Procedures:   * No surgery found *     Brief Admission History      Ryann Ly is a 63 y.o. male with a history of ESRD on dialysis who presented to [facility] ED on 9/25/2023 for dialysis.  Patient normally gets dialysis Monday Wednesday Friday and has right IJ tunneled cath in place.  Patient is an undocumented immigrant and normally gets dialysis in the ED. patient reports having lower extremity edema was improved from prior.  He denies having any fevers, chills, chest pain, shortness of breath, nausea, vomiting, constipation, or diarrhea.  Patient does still make urine.  Patient has no complaints at this time and is here to receive dialysis.  In the ED, patient was afebrile 97.2, heart rate respiratory rate 17, /67, saturating 98% on room air.  CBC remarkable for H/H 10.3/31.7 which appears to be around patient's baseline.  CMP notable for sodium 123, chloride 91, BUN/CR 39.3/8.19.  BNP elevated 598 though improved from prior 1656 2 weeks ago.  Internal Medicine was consulted for dialysis.      Hospital Course with Pertinent Findings     Patient was admitted to the hospital for usual scheduled dialysis.  Nephrology was consulted and dialysis was initiated.  BMP was repeated after dialysis to assess for correction of electrolytes.  Sodium had then increased  "to 133.  BUN/CR also 13.9/3.95.  Patient remains asymptomatic and continues to feel well at this time.  Plan to discharge patient home at this time.  Patient given strict ED return precautions should he start noticing worsening lower extremity swelling or shortness a breath.    Discharge physical exam:  Vitals  BP: 135/80  Temp: 97.2 °F (36.2 °C)  Temp Source: Temporal  Pulse: 66  Resp: 19  SpO2: 96 %  Height: 5' 2" (157.5 cm)  Weight: 54.6 kg (120 lb 5.9 oz)    Physical Exam  Constitutional:       Appearance: Normal appearance.   HENT:      Head: Normocephalic and atraumatic.      Nose: Nose normal.      Mouth/Throat:      Mouth: Mucous membranes are moist.      Pharynx: Oropharynx is clear.   Eyes:      Conjunctiva/sclera: Conjunctivae normal.      Pupils: Pupils are equal, round, and reactive to light.   Neck:      Comments: Right IJ tunneled cath in place  Cardiovascular:      Rate and Rhythm: Normal rate and regular rhythm.      Pulses: Normal pulses.      Heart sounds: No murmur heard.  Pulmonary:      Effort: Pulmonary effort is normal. No respiratory distress.      Breath sounds: No wheezing.   Chest:      Chest wall: No tenderness.   Abdominal:      General: Abdomen is flat. Bowel sounds are normal. There is no distension.      Palpations: Abdomen is soft.      Tenderness: There is no abdominal tenderness.   Musculoskeletal:         General: Swelling present. Normal range of motion.      Cervical back: Normal range of motion.      Comments: Mild bilateral lower extremity edema present   Skin:     General: Skin is warm.   Neurological:      General: No focal deficit present.      Mental Status: He is alert and oriented to person, place, and time.          TIME SPENT ON DISCHARGE: 60 minutes    Discharge Medications        Medication List        CONTINUE taking these medications      amLODIPine 10 MG tablet  Commonly known as: NORVASC  Take 1 tablet (10 mg total) by mouth every evening.     aspirin 81 MG " Chew  Take 1 tablet (81 mg total) by mouth once daily.     atorvastatin 40 MG tablet  Commonly known as: LIPITOR  Take 1 tablet (40 mg total) by mouth once daily.     calcitRIOL 0.25 MCG Cap  Commonly known as: ROCALTROL  Take 1 capsule (0.25 mcg total) by mouth every other day.     carvediloL 25 MG tablet  Commonly known as: COREG  Take 1 tablet (25 mg total) by mouth 2 (two) times daily.     hydrALAZINE 25 MG tablet  Commonly known as: APRESOLINE  Take 3 tablets (75 mg total) by mouth 3 (three) times daily.     isosorbide dinitrate 20 MG tablet  Commonly known as: ISORDIL  Take 1 tablet (20 mg total) by mouth 3 (three) times daily.     sevelamer carbonate 800 mg Tab  Commonly known as: RENVELA  Take 1 tablet (800 mg total) by mouth 3 (three) times daily with meals.              Discharge Information:      Follow-up Information       [facility] Follow up.    Specialty: Emergency Medicine  Why: As needed, If symptoms worsen  Contact information:  [address] [address] [phone]                             Dejuan Quintero MD  [facility] Internal Medicine, HO[facility]

## 2023-09-25 NOTE — H&P
"[facility] Medicine Wards   History & Physical Note     Resident Team: [facility] Medicine List 1  Attending Physician: Maxi Webster MD  Resident: Bakari  Intern: Ck     Date of Admit: 9/25/2023    Chief Complaint:     Abdominal Pain (Patient in with c/o L sided abd pain, L flank pain, bilateral feet swelling and shortness of breath. States was recently hospitalized for "kidney problem" and has temp dialysis catheter. Last run of dialysis Friday. Compliant with medications. + nausea. Denies vomiting/diarrhea. No chest pain. Denies dysuria. Used  for Afghan language for triage. )       Subjective:      History of Present Illness:  Ryann Ly is a 63 y.o. male with a history of ESRD on dialysis who presented to [facility] ED on 9/25/2023 for dialysis.  Patient normally gets dialysis Monday Wednesday Friday and has right IJ tunneled cath in place.  Patient is an undocumented immigrant and normally gets dialysis in the ED. patient reports having lower extremity edema was improved from prior.  He denies having any fevers, chills, chest pain, shortness of breath, nausea, vomiting, constipation, or diarrhea.  Patient does still make urine.  Patient has no complaints at this time and is here to receive dialysis.  In the ED, patient was afebrile 97.2, heart rate respiratory rate 17, /67, saturating 98% on room air.  CBC remarkable for H/H 10.3/31.7 which appears to be around patient's baseline.  CMP notable for sodium 123, chloride 91, BUN/CR 39.3/8.19.  BNP elevated 598 though improved from prior 1656 2 weeks ago.  Internal Medicine was consulted for dialysis.        Past Medical History:   has a past medical history of Renal disorder.     Past Surgical History:   has a past surgical history that includes Insertion of tunneled central venous hemodialysis catheter (N/A, 9/19/2023).     Family History:  family history is not on file.     Social History:   reports that he has never smoked. He has never used " smokeless tobacco. He reports that he does not drink alcohol and does not use drugs.     Allergies:  has No Known Allergies.     Home Medications:  Prior to Admission medications    Medication Sig Start Date End Date Taking? Authorizing Provider   amLODIPine (NORVASC) 10 MG tablet Take 1 tablet (10 mg total) by mouth every evening. 9/20/23 9/19/24  Abner Weaver MD   aspirin 81 MG Chew Take 1 tablet (81 mg total) by mouth once daily. 9/21/23 9/20/24  Abner Weaver MD   atorvastatin (LIPITOR) 40 MG tablet Take 1 tablet (40 mg total) by mouth once daily. 9/20/23 9/19/24  Abner Weaver MD   calcitRIOL (ROCALTROL) 0.25 MCG Cap Take 1 capsule (0.25 mcg total) by mouth every other day. 9/22/23   Abner Weaver MD   carvediloL (COREG) 25 MG tablet Take 1 tablet (25 mg total) by mouth 2 (two) times daily. 9/20/23 9/19/24  Abner Weaver MD   hydrALAZINE (APRESOLINE) 25 MG tablet Take 3 tablets (75 mg total) by mouth 3 (three) times daily. 9/20/23 9/19/24  Abner Weaver MD   isosorbide dinitrate (ISORDIL) 20 MG tablet Take 1 tablet (20 mg total) by mouth 3 (three) times daily. 9/20/23 9/19/24  Abner Weaver MD   sevelamer carbonate (RENVELA) 800 mg Tab Take 1 tablet (800 mg total) by mouth 3 (three) times daily with meals. 9/20/23 9/19/24  Abner Weaver MD       Review of Systems:  Review of Systems   Constitutional:  Negative for chills and fever.   HENT:  Negative for congestion, sinus pain and sore throat.    Eyes:  Negative for blurred vision and double vision.   Respiratory:  Negative for cough, sputum production, shortness of breath and wheezing.    Cardiovascular:  Negative for chest pain, palpitations and leg swelling.   Gastrointestinal:  Negative for abdominal pain, nausea and vomiting.   Genitourinary:  Negative for dysuria.   Musculoskeletal:  Negative for myalgias.   Neurological:  Negative for dizziness, focal weakness, seizures and weakness.        Objective:     Vital Signs (Most Recent):  Temp: 97.2 °F  (36.2 °C) (09/25/23 0913)  Pulse: 66 (09/25/23 1200)  Resp: 19 (09/25/23 1200)  BP: 135/80 (09/25/23 1201)  SpO2: 96 % (09/25/23 1200) Vital Signs (24h Range):  Temp:  [97.2 °F (36.2 °C)] 97.2 °F (36.2 °C)  Pulse:  [64-70] 66  Resp:  [16-19] 19  SpO2:  [96 %-98 %] 96 %  BP: (108-137)/(67-80) 135/80     Physical Examination:  Physical Exam  Constitutional:       Appearance: Normal appearance.   HENT:      Head: Normocephalic and atraumatic.      Nose: Nose normal.      Mouth/Throat:      Mouth: Mucous membranes are moist.      Pharynx: Oropharynx is clear.   Eyes:      Conjunctiva/sclera: Conjunctivae normal.      Pupils: Pupils are equal, round, and reactive to light.   Neck:      Comments: Right IJ tunnel cath in place  Cardiovascular:      Rate and Rhythm: Normal rate and regular rhythm.      Pulses: Normal pulses.      Heart sounds: No murmur heard.  Pulmonary:      Effort: Pulmonary effort is normal. No respiratory distress.      Breath sounds: No wheezing.   Chest:      Chest wall: No tenderness.   Abdominal:      General: Abdomen is flat. Bowel sounds are normal. There is no distension.      Palpations: Abdomen is soft.      Tenderness: There is no abdominal tenderness.   Musculoskeletal:         General: No swelling. Normal range of motion.      Cervical back: Normal range of motion.      Comments: Mild bilateral lower extremity edema   Skin:     General: Skin is warm.   Neurological:      General: No focal deficit present.      Mental Status: He is alert and oriented to person, place, and time.          Laboratory:  Most Recent Data:  CBC:   Recent Labs   Lab 09/25/23  0952   WBC 5.26   HGB 10.3*   HCT 31.7*        CMP:   Recent Labs   Lab 09/25/23  0952   CALCIUM 8.3*   ALBUMIN 2.8*   *   K 4.7   CO2 23   BUN 39.3*   CREATININE 8.19*   ALKPHOS 52   ALT 19   AST 14   BILITOT 0.3         Microbiology Data:  N/A    Other Results:  EKG (my interpretation): normal EKG, normal sinus rhythm, unchanged  from previous tracings    Radiology:  Imaging Results              X-Ray Chest AP Portable (Final result)  Result time 09/25/23 10:34:58      Final result by Vincenzo Winn MD (09/25/23 10:34:58)                   Impression:      No acute cardiopulmonary process.      Electronically signed by: Vincenzo Winn  Date:    09/25/2023  Time:    10:34               Narrative:    EXAMINATION:  XR CHEST AP PORTABLE    CLINICAL HISTORY:  Shortness of breath;    TECHNIQUE:  Single view of the chest    COMPARISON:  09/22/2023    FINDINGS:  No focal opacification, pleural effusion, or pneumothorax.    The cardiomediastinal silhouette is within normal limits.    Tunneled right-sided hemodialysis catheter is noted.    No acute osseous abnormality.                                       Lines/Drains/Airways       Central Venous Catheter Line  Duration                  Hemodialysis Catheter right subclavian -- days              Peripheral Intravenous Line  Duration                  Peripheral IV - Single Lumen 09/25/23 0949 18 G Anterior;Left;Proximal Forearm <1 day                     Assessment & Plan:     ESRD on dialysis  Hyponatremia  -normally receives dialysis Monday Wednesday Friday  -sodium 123  -EKG revealed normal sinus rhythm  -consult nephrology for dialysis  -we will recheck BMP after dialysis to assess for correction of electrolytes  -suspect patient will be able to discharge home after dialysis    Hypertension  -/67 on admission; reports compliance with home medication regimen  -on home amlodipine 10 mg daily, Coreg 25 mg b.i.d., hydralazine 75 mg t.i.d.        CODE STATUS: Full Code  Access: Peripheral  Antibiotics: N/A  Diet: Renal  DVT Prophylaxis: Teds/SCDs  GI Prophylaxis: none needed    Disposition: day 0 of admission for scheduled dialysis Monday Wednesday Friday.  Likely okay to discharge once dialysis complete.    Dejuan Quintero MD  [facility] Internal Medicine, [facility]

## 2023-09-25 NOTE — ED PROVIDER NOTES
"Encounter Date: 9/25/2023       History     Chief Complaint   Patient presents with    Abdominal Pain     Patient in with c/o L sided abd pain, L flank pain, bilateral feet swelling and shortness of breath. States was recently hospitalized for "kidney problem" and has temp dialysis catheter. Last run of dialysis Friday. Compliant with medications. + nausea. Denies vomiting/diarrhea. No chest pain. Denies dysuria. Used  for Divehi language for triage.      Patient presents to the emergency department complaining of lower extremity swelling shortness of breath and abdominal swelling.  New diagnosis of renal failure, has a tunneled dialysis catheter in.  Was recently admitted twice for dialysis and discharge.  He was undocumented immigrant, currently does not have outpatient dialysis set up.  He was here 2 due to worsening volume overload.  He denies any chest pain.    The history is provided by the patient.     Review of patient's allergies indicates:  No Known Allergies  Past Medical History:   Diagnosis Date    Renal disorder      Past Surgical History:   Procedure Laterality Date    INSERTION OF TUNNELED CENTRAL VENOUS HEMODIALYSIS CATHETER N/A 9/19/2023    Procedure: Insertion, Catheter, Central Venous, Hemodialysis;  Surgeon: Theodore Benitez MD;  Location: [facility] CATH LAB;  Service: Peripheral Vascular;  Laterality: N/A;     No family history on file.  Social History     Tobacco Use    Smoking status: Never    Smokeless tobacco: Never   Substance Use Topics    Alcohol use: Never    Drug use: Never     Review of Systems   Constitutional:  Negative for chills and fever.   HENT:  Negative for congestion and sore throat.    Respiratory:  Positive for shortness of breath. Negative for cough.    Cardiovascular:  Positive for leg swelling. Negative for chest pain and palpitations.   Gastrointestinal:  Positive for abdominal distention. Negative for abdominal pain and nausea.   Genitourinary:  Negative for " dysuria and hematuria.   Musculoskeletal:  Negative for arthralgias and myalgias.   Neurological:  Negative for dizziness and weakness.       Physical Exam     Initial Vitals [09/25/23 0913]   BP Pulse Resp Temp SpO2   110/67 68 17 97.2 °F (36.2 °C) 98 %      MAP       --         Physical Exam    Nursing note and vitals reviewed.  Constitutional: He appears well-developed and well-nourished.   HENT:   Head: Normocephalic and atraumatic.   Eyes: Conjunctivae are normal. Pupils are equal, round, and reactive to light.   Neck: Neck supple.   Normal range of motion.  Cardiovascular:  Normal rate, regular rhythm and normal heart sounds.           Pulmonary/Chest: Breath sounds normal. No respiratory distress.   Abdominal: Abdomen is soft. There is no abdominal tenderness.   Musculoskeletal:         General: Edema (bilateral lower extremity) present. Normal range of motion.      Cervical back: Normal range of motion and neck supple.     Neurological: He is alert and oriented to person, place, and time.   Skin: Skin is warm and dry.         ED Course   Procedures  Labs Reviewed   COMPREHENSIVE METABOLIC PANEL - Abnormal; Notable for the following components:       Result Value    Sodium Level 123 (*)     Chloride 91 (*)     Glucose Level 116 (*)     Blood Urea Nitrogen 39.3 (*)     Creatinine 8.19 (*)     Calcium Level Total 8.3 (*)     Albumin Level 2.8 (*)     Albumin/Globulin Ratio 0.9 (*)     All other components within normal limits   B-TYPE NATRIURETIC PEPTIDE - Abnormal; Notable for the following components:    Natriuretic Peptide 598.8 (*)     All other components within normal limits   CBC WITH DIFFERENTIAL - Abnormal; Notable for the following components:    RBC 4.15 (*)     Hgb 10.3 (*)     Hct 31.7 (*)     MCV 76.4 (*)     MCH 24.8 (*)     MCHC 32.5 (*)     All other components within normal limits   TROPONIN I - Normal   CBC W/ AUTO DIFFERENTIAL    Narrative:     The following orders were created for panel  order CBC auto differential.  Procedure                               Abnormality         Status                     ---------                               -----------         ------                     CBC with Differential[[id number]]       Abnormal            Final result                 Please view results for these tests on the individual orders.   EXTRA TUBES    Narrative:     The following orders were created for panel order EXTRA TUBES.  Procedure                               Abnormality         Status                     ---------                               -----------         ------                     Light Blue Top Hold[[id number]]                             In process                 Gold Top Hold[[id number]]                                   In process                 Pink Top Hold[[id number]]                                   In process                   Please view results for these tests on the individual orders.   LIGHT BLUE TOP HOLD   GOLD TOP HOLD   PINK TOP HOLD     EKG Readings: (Independently Interpreted)   Initial Reading: No STEMI. Heart Rate: 67. Ectopy: No Ectopy. Axis: Left Axis Deviation.     ECG Results              EKG 12-lead (Final result)  Result time 09/25/23 10:44:59      Final result by Interface, Lab In [facility] (09/25/23 10:44:59)                   Narrative:    Test Reason : R06.02,    Vent. Rate : 067 BPM     Atrial Rate : 067 BPM     P-R Int : 170 ms          QRS Dur : 144 ms      QT Int : 456 ms       P-R-T Axes : 011 -34 098 degrees     QTc Int : 481 ms    Normal sinus rhythm  Left axis deviation  Left bundle branch block  Abnormal ECG  When compared with ECG of 22-SEP-2023 11:10,  Left bundle branch block has replaced Nonspecific intraventricular block  T wave inversion no longer evident in Inferior leads  Confirmed by Jaciel Tolbert MD ([id number]) on 9/25/2023 10:44:48 AM    Referred By: YULIET   SELF           Confirmed By:Jaciel Tolbert MD                                   Imaging Results              X-Ray Chest AP Portable (Final result)  Result time 09/25/23 10:34:58      Final result by Vincenzo Winn MD (09/25/23 10:34:58)                   Impression:      No acute cardiopulmonary process.      Electronically signed by: Vincenzo Winn  Date:    09/25/2023  Time:    10:34               Narrative:    EXAMINATION:  XR CHEST AP PORTABLE    CLINICAL HISTORY:  Shortness of breath;    TECHNIQUE:  Single view of the chest    COMPARISON:  09/22/2023    FINDINGS:  No focal opacification, pleural effusion, or pneumothorax.    The cardiomediastinal silhouette is within normal limits.    Tunneled right-sided hemodialysis catheter is noted.    No acute osseous abnormality.                                       Medications   sodium chloride 0.9% flush 10 mL (has no administration in time range)     Medical Decision Making  Signs are stable.  EKGs without acute concerning ischemic changes.  CBC is generally unremarkable, CMP is consistent with his dialysis status but potassium within normal limits.  Chest x-ray is generally clear.  Patient with no indications for emergent dialysis so we will admit to the Internal Medicine team for nephrology consult for inpatient dialysis.    Amount and/or Complexity of Data Reviewed  Labs: ordered. Decision-making details documented in ED Course.  Radiology: ordered. Decision-making details documented in ED Course.  ECG/medicine tests: ordered and independent interpretation performed. Decision-making details documented in ED Course.     Details: Discussed with the Internal Medicine team patient's ED course, history and findings.    Risk  Decision regarding hospitalization.                               Clinical Impression:   Final diagnoses:  [R06.02] Shortness of breath  [E87.70] Hypervolemia, unspecified hypervolemia type (Primary)  [N18.6, Z99.2] ESRD on dialysis        ED Disposition Condition    Observation                 Dwight Guadalupe,  MD  09/25/23 6602

## 2023-09-25 NOTE — CONSULTS
"[facility]  Nephrology Consultation    Patient Name: Ryann Ly  Age: 63 y.o.  : 1960  MRN: [id number]  Admission Date: 2023    Date of Consultation: 2023    Consultation Requested By: Dwight Guadalupe MD    Reason for Consultation: ESRD    Chief Complaint: Abdominal Pain (Patient in with c/o L sided abd pain, L flank pain, bilateral feet swelling and shortness of breath. States was recently hospitalized for "kidney problem" and has temp dialysis catheter. Last run of dialysis Friday. Compliant with medications. + nausea. Denies vomiting/diarrhea. No chest pain. Denies dysuria. Used  for Setswana language for triage. )      History of Present Illness:  Mr Ryann Ly is a 63 y.o. Other male with past medical history of end-stage renal disease, hypertension, CKD MBD, and anemia of chronic disease.  Patient presents to the emergency department today with complaints of lower extremity edema and shortness of breath.  Laboratory results were remarkable for hyponatremia and azotemia.  Patient was admitted to medicine service, Nephrology was consulted for assistance with management of ESRD.    Patient has No Known Allergies.     Review of Systems   Constitutional: Negative.    HENT: Negative.     Eyes: Negative.    Respiratory:  Positive for shortness of breath.    Cardiovascular:  Positive for leg swelling.   Gastrointestinal: Negative.    Endocrine: Negative.    Genitourinary: Negative.    Musculoskeletal: Negative.    Skin: Negative.    Allergic/Immunologic: Negative.    Neurological: Negative.    Hematological: Negative.    Psychiatric/Behavioral: Negative.         Past Medical History:  has a past medical history of Renal disorder.    Procedure History:  has a past surgical history that includes Insertion of tunneled central venous hemodialysis catheter (N/A, 2023).    Family History: family history is not on file.    Social History:  reports that he " "has never smoked. He has never used smokeless tobacco. He reports that he does not drink alcohol and does not use drugs.    Physical Exam:   /70   Pulse 70   Temp 97.2 °F (36.2 °C) (Temporal)   Resp 16   Ht 5' 2" (1.575 m)   Wt 54.6 kg (120 lb 5.9 oz)   SpO2 98%   BMI 22.02 kg/m²  Body mass index is 22.02 kg/m².  General appearance: Patient is in no acute distress.  Skin: No rashes or wounds.  HEENT: PERRLA, EOMI, no scleral icterus, no JVD. Neck is supple.  Chest: Respirations are unlabored. Lungs sounds are diminished to auscultation.  Right chest wall tunneled dialysis catheter is in place, exit site without signs of infection.  Heart: S1, S2.   Abdomen: Benign.  : Deferred.  Extremities:  Bilateral lower extremity pitting 2+ edema, peripheral pulses are palpable.   Neuro: No focal deficits.     Inpatient Medications:   No current facility-administered medications for this encounter.    Current Outpatient Medications:     amLODIPine (NORVASC) 10 MG tablet, Take 1 tablet (10 mg total) by mouth every evening., Disp: 90 tablet, Rfl: 3    aspirin 81 MG Chew, Take 1 tablet (81 mg total) by mouth once daily., Disp: 90 tablet, Rfl: 3    atorvastatin (LIPITOR) 40 MG tablet, Take 1 tablet (40 mg total) by mouth once daily., Disp: 90 tablet, Rfl: 3    calcitRIOL (ROCALTROL) 0.25 MCG Cap, Take 1 capsule (0.25 mcg total) by mouth every other day., Disp: 90 capsule, Rfl: 3    carvediloL (COREG) 25 MG tablet, Take 1 tablet (25 mg total) by mouth 2 (two) times daily., Disp: 60 tablet, Rfl: 11    hydrALAZINE (APRESOLINE) 25 MG tablet, Take 3 tablets (75 mg total) by mouth 3 (three) times daily., Disp: 270 tablet, Rfl: 11    isosorbide dinitrate (ISORDIL) 20 MG tablet, Take 1 tablet (20 mg total) by mouth 3 (three) times daily., Disp: 90 tablet, Rfl: 11    sevelamer carbonate (RENVELA) 800 mg Tab, Take 1 tablet (800 mg total) by mouth 3 (three) times daily with meals., Disp: 90 tablet, Rfl: 11 "     Imaging:  Reviewed    Laboratory Data:   Serum  Lab Results   Component Value Date    WBC 5.26 09/25/2023    HGB 10.3 (L) 09/25/2023    HCT 31.7 (L) 09/25/2023     09/25/2023    IRON 63 (L) 09/11/2023    TIBC 197 (L) 09/11/2023    TRANS 167 (L) 09/11/2023    LABIRON 32 09/11/2023    FERRITIN 487.55 (H) 09/11/2023    HAPTO 204 09/11/2023     (L) 09/25/2023    K 4.7 09/25/2023    CHLORIDE 91 (L) 09/25/2023    CO2 23 09/25/2023    BUN 39.3 (H) 09/25/2023    CREATININE 8.19 (H) 09/25/2023    EGFRNORACEVR 7 09/25/2023    GLUCOSE 116 (H) 09/25/2023    CALCIUM 8.3 (L) 09/25/2023    ALKPHOS 52 09/25/2023    LABPROT 5.8 09/25/2023    ALBUMIN 2.8 (L) 09/25/2023    AST 14 09/25/2023    ALT 19 09/25/2023    MG 2.00 09/23/2023    PHOS 4.6 09/23/2023      Lab Results   Component Value Date    HGBA1C 5.6 09/12/2023    .5 (H) 09/22/2023    AJSJCIDW74QB 18.9 (L) 09/15/2023    CANCA Negative 09/11/2023    PANCA Negative 09/11/2023    HIV Nonreactive 09/16/2023    HEPCAB Nonreactive 09/15/2023    HEPBSURFAG Nonreactive 09/12/2023    HEPBSAB Nonreactive 09/12/2023    MSPIKEPCT  09/16/2023      Comment:      Not observed     Urine:  Lab Results   Component Value Date    COLORUA Colorless (A) 09/22/2023    APPEARANCEUA Clear 09/22/2023    SGUA 1.005 09/22/2023    PHUA 7.0 09/22/2023    PROTEINUA 2+ (A) 09/22/2023    GLUCOSEUA Normal 09/22/2023    KETONESUA Negative 09/22/2023    BLOODUA Trace (A) 09/22/2023    NITRITESUA Negative 09/22/2023    LEUKOCYTESUR Negative 09/22/2023    RBCUA 0-5 09/22/2023    WBCUA 0-5 09/22/2023    BACTERIA None Seen 09/22/2023    SQEPUA None Seen 09/22/2023    HYALINECASTS None Seen 09/22/2023    CREATRANDUR 64.8 09/11/2023    PROTEINURINE 407.8 09/11/2023    UPROTCREA 6.3 09/11/2023      Microbiology Results (last 7 days)       ** No results found for the last 168 hours. **             Impression:   End-stage renal disease  Volume overload  Hyponatremia, hypervolemic   Anemia of  chronic disease   Hypertension    Plan:   Will proceed with hemodialysis today, goal UF 2-3 L as tolerated.  No indications for ELISSA.  Will send a referral to vascular surgery Clinic for AV access creation.  Continue hemodialysis on an as-needed basis.  Disposition per primary service.    Thank you very much for your consultation.     Breanna Webster NP  [facility] Nephrology  9/25/2023 10:59 AM

## 2023-09-27 ENCOUNTER — HOSPITAL ENCOUNTER (EMERGENCY)
Facility: HOSPITAL | Age: 63
Discharge: HOME | End: 2023-09-27
Attending: INTERNAL MEDICINE

## 2023-09-27 VITALS
DIASTOLIC BLOOD PRESSURE: 80 MMHG | SYSTOLIC BLOOD PRESSURE: 152 MMHG | RESPIRATION RATE: 20 BRPM | TEMPERATURE: 98 F | BODY MASS INDEX: 21.3 KG/M2 | HEART RATE: 69 BPM | WEIGHT: 115.75 LBS | HEIGHT: 62 IN | OXYGEN SATURATION: 99 %

## 2023-09-27 DIAGNOSIS — M54.41 ACUTE BILATERAL LOW BACK PAIN WITH RIGHT-SIDED SCIATICA: Primary | ICD-10-CM

## 2023-09-27 PROCEDURE — 99284 EMERGENCY DEPT VISIT MOD MDM: CPT

## 2023-09-27 RX ORDER — HYDROCODONE BITARTRATE AND ACETAMINOPHEN 5; 325 MG/1; MG/1
1 TABLET ORAL EVERY 8 HOURS PRN
Qty: 6 TABLET | Refills: 0 | Status: ON HOLD | OUTPATIENT
Start: 2023-09-27 | End: 2023-10-05 | Stop reason: HOSPADM

## 2023-09-27 RX ORDER — LIDOCAINE 50 MG/G
1 PATCH TOPICAL DAILY
Qty: 12 PATCH | Refills: 0 | Status: ON HOLD | OUTPATIENT
Start: 2023-09-27 | End: 2023-10-05 | Stop reason: HOSPADM

## 2023-09-27 RX ORDER — BACLOFEN 10 MG/1
10 TABLET ORAL 2 TIMES DAILY
Qty: 20 TABLET | Refills: 0 | Status: ON HOLD | OUTPATIENT
Start: 2023-09-27 | End: 2023-10-05 | Stop reason: HOSPADM

## 2023-09-27 NOTE — ED PROVIDER NOTES
Encounter Date: 9/27/2023       History     Chief Complaint   Patient presents with    Back Pain     Lower back pain and buttock pain radiating down legs for a FEW DAYS     62 YO male in ER with complaints of lower back pain that radiates into the buttocks bilaterally X 3 days. Denies injury/trauma, fever, chills, chest pain, SOB, abdominal pain, N/V/D, HA or dizziness. No other complaints.     The history is provided by the patient.     Review of patient's allergies indicates:  No Known Allergies  Past Medical History:   Diagnosis Date    Renal disorder      Past Surgical History:   Procedure Laterality Date    INSERTION OF TUNNELED CENTRAL VENOUS HEMODIALYSIS CATHETER N/A 9/19/2023    Procedure: Insertion, Catheter, Central Venous, Hemodialysis;  Surgeon: Theodore Benitez MD;  Location: [facility] CATH LAB;  Service: Peripheral Vascular;  Laterality: N/A;     History reviewed. No pertinent family history.  Social History     Tobacco Use    Smoking status: Never    Smokeless tobacco: Never   Substance Use Topics    Alcohol use: Never    Drug use: Never     Review of Systems   Constitutional:  Negative for chills and fever.   HENT:  Negative for congestion and trouble swallowing.    Respiratory:  Negative for shortness of breath and wheezing.    Cardiovascular:  Negative for chest pain and leg swelling.   Gastrointestinal:  Negative for abdominal pain, diarrhea, nausea and vomiting.   Musculoskeletal:  Positive for back pain and myalgias. Negative for joint swelling.   Skin:  Negative for rash.   Neurological:  Negative for dizziness, weakness and headaches.   All other systems reviewed and are negative.      Physical Exam     Initial Vitals [09/27/23 0938]   BP Pulse Resp Temp SpO2   (!) 152/80 69 20 98.2 °F (36.8 °C) 99 %      MAP       --         Physical Exam    Nursing note and vitals reviewed.  Constitutional: He appears well-developed and well-nourished.   HENT:   Head: Normocephalic and atraumatic.   Eyes:  Conjunctivae are normal.   Neck: Neck supple.   Cardiovascular:  Normal rate and regular rhythm.           Pulmonary/Chest: Breath sounds normal.   Musculoskeletal:      Cervical back: Neck supple.      Lumbar back: Spasms and tenderness present. No bony tenderness. Decreased range of motion.        Back:      Neurological: He is alert and oriented to person, place, and time.   Skin: Skin is dry.   Psychiatric: He has a normal mood and affect.         ED Course   Procedures  Labs Reviewed - No data to display       Imaging Results    None          Medications - No data to display  Medical Decision Making  Risk  Prescription drug management.                               Clinical Impression:   Final diagnoses:  [M54.41] Acute bilateral low back pain with right-sided sciatica (Primary)        ED Disposition Condition    Discharge Stable          ED Prescriptions       Medication Sig Dispense Start Date End Date Auth. Provider    baclofen (LIORESAL) 10 MG tablet Take 1 tablet (10 mg total) by mouth 2 (two) times daily. for 20 doses 20 tablet 9/27/2023 10/7/2023 Jude Schreiber PA    LIDOcaine (LIDODERM) 5 % Place 1 patch onto the skin once daily. Remove & Discard patch within 12 hours or as directed by MD for 12 doses 12 patch 9/27/2023 10/9/2023 Jude Schreiber PA    HYDROcodone-acetaminophen (NORCO) 5-325 mg per tablet Take 1 tablet by mouth every 8 (eight) hours as needed for Pain. 6 tablet 9/27/2023 -- Jude Schreiber PA          Follow-up Information       Follow up With Specialties Details Why Contact Info    [facility] - Emergency Dept Emergency Medicine In 3 days As needed, If symptoms worsen [address] [facility] [address] [phone]    [facility] Internal Medicine Internal Medicine Schedule an appointment as soon as possible for a visit in 3 days  [address] [facility] [address] [phone]    Dialysis    Keep scheduled appointment for tomorrow              Jude Schreiber PA  09/27/23 6725

## 2023-09-27 NOTE — DISCHARGE INSTRUCTIONS
Take all medications as prescribed.     Drink plenty of fluids and get a lot of rest.     Use heat to affected area.    Return to ER for any changes or worsening of symptoms.

## 2023-09-28 ENCOUNTER — HOSPITAL ENCOUNTER (OUTPATIENT)
Facility: HOSPITAL | Age: 63
Discharge: HOME | End: 2023-09-28
Attending: INTERNAL MEDICINE | Admitting: INTERNAL MEDICINE

## 2023-09-28 VITALS
HEART RATE: 80 BPM | BODY MASS INDEX: 21.55 KG/M2 | TEMPERATURE: 98 F | DIASTOLIC BLOOD PRESSURE: 86 MMHG | OXYGEN SATURATION: 100 % | WEIGHT: 117.81 LBS | SYSTOLIC BLOOD PRESSURE: 133 MMHG | RESPIRATION RATE: 14 BRPM

## 2023-09-28 DIAGNOSIS — Z99.2 CKD (CHRONIC KIDNEY DISEASE) REQUIRING CHRONIC DIALYSIS: Primary | ICD-10-CM

## 2023-09-28 DIAGNOSIS — N18.6 CKD (CHRONIC KIDNEY DISEASE) REQUIRING CHRONIC DIALYSIS: Primary | ICD-10-CM

## 2023-09-28 DIAGNOSIS — R07.9 CHEST PAIN: ICD-10-CM

## 2023-09-28 LAB
ALBUMIN SERPL-MCNC: 2.7 G/DL (ref 3.4–4.8)
ALBUMIN/GLOB SERPL: 0.9 RATIO (ref 1.1–2)
ALP SERPL-CCNC: 49 UNIT/L (ref 40–150)
ALT SERPL-CCNC: 19 UNIT/L (ref 0–55)
AST SERPL-CCNC: 12 UNIT/L (ref 5–34)
BASOPHILS # BLD AUTO: 0.05 X10(3)/MCL
BASOPHILS NFR BLD AUTO: 0.8 %
BILIRUB SERPL-MCNC: 0.3 MG/DL
BUN SERPL-MCNC: 35.5 MG/DL (ref 8.4–25.7)
CALCIUM SERPL-MCNC: 8.2 MG/DL (ref 8.8–10)
CHLORIDE SERPL-SCNC: 99 MMOL/L (ref 98–107)
CO2 SERPL-SCNC: 22 MMOL/L (ref 23–31)
CREAT SERPL-MCNC: 8.44 MG/DL (ref 0.73–1.18)
EOSINOPHIL # BLD AUTO: 0.29 X10(3)/MCL (ref 0–0.9)
EOSINOPHIL NFR BLD AUTO: 4.4 %
ERYTHROCYTE [DISTWIDTH] IN BLOOD BY AUTOMATED COUNT: 14.6 % (ref 11.5–17)
GFR SERPLBLD CREATININE-BSD FMLA CKD-EPI: 7 MLS/MIN/1.73/M2
GLOBULIN SER-MCNC: 2.9 GM/DL (ref 2.4–3.5)
GLUCOSE SERPL-MCNC: 117 MG/DL (ref 82–115)
HCT VFR BLD AUTO: 29.8 % (ref 42–52)
HGB BLD-MCNC: 9.6 G/DL (ref 14–18)
IMM GRANULOCYTES # BLD AUTO: 0.01 X10(3)/MCL (ref 0–0.04)
IMM GRANULOCYTES NFR BLD AUTO: 0.2 %
LYMPHOCYTES # BLD AUTO: 1.09 X10(3)/MCL (ref 0.6–4.6)
LYMPHOCYTES NFR BLD AUTO: 16.5 %
MAGNESIUM SERPL-MCNC: 2.2 MG/DL (ref 1.6–2.6)
MCH RBC QN AUTO: 25 PG (ref 27–31)
MCHC RBC AUTO-ENTMCNC: 32.2 G/DL (ref 33–36)
MCV RBC AUTO: 77.6 FL (ref 80–94)
MONOCYTES # BLD AUTO: 0.74 X10(3)/MCL (ref 0.1–1.3)
MONOCYTES NFR BLD AUTO: 11.2 %
NEUTROPHILS # BLD AUTO: 4.42 X10(3)/MCL (ref 2.1–9.2)
NEUTROPHILS NFR BLD AUTO: 66.9 %
NRBC BLD AUTO-RTO: 0 %
PHOSPHATE SERPL-MCNC: 4.9 MG/DL (ref 2.3–4.7)
PLATELET # BLD AUTO: 240 X10(3)/MCL (ref 130–400)
PMV BLD AUTO: 9 FL (ref 7.4–10.4)
POTASSIUM SERPL-SCNC: 4.7 MMOL/L (ref 3.5–5.1)
PROT SERPL-MCNC: 5.6 GM/DL (ref 5.8–7.6)
RBC # BLD AUTO: 3.84 X10(6)/MCL (ref 4.7–6.1)
SODIUM SERPL-SCNC: 130 MMOL/L (ref 136–145)
WBC # SPEC AUTO: 6.6 X10(3)/MCL (ref 4.5–11.5)

## 2023-09-28 PROCEDURE — G0257 UNSCHED DIALYSIS ESRD PT HOS: HCPCS

## 2023-09-28 PROCEDURE — G0378 HOSPITAL OBSERVATION PER HR: HCPCS

## 2023-09-28 PROCEDURE — 99285 EMERGENCY DEPT VISIT HI MDM: CPT | Mod: 25

## 2023-09-28 PROCEDURE — 85025 COMPLETE CBC W/AUTO DIFF WBC: CPT | Performed by: NURSE PRACTITIONER

## 2023-09-28 PROCEDURE — 83735 ASSAY OF MAGNESIUM: CPT | Performed by: NURSE PRACTITIONER

## 2023-09-28 PROCEDURE — 84100 ASSAY OF PHOSPHORUS: CPT | Performed by: NURSE PRACTITIONER

## 2023-09-28 PROCEDURE — 80053 COMPREHEN METABOLIC PANEL: CPT | Performed by: NURSE PRACTITIONER

## 2023-09-28 RX ORDER — HEPARIN SODIUM 5000 [USP'U]/ML
5000 INJECTION, SOLUTION INTRAVENOUS; SUBCUTANEOUS EVERY 12 HOURS
Status: DISCONTINUED | OUTPATIENT
Start: 2023-09-28 | End: 2023-09-28 | Stop reason: HOSPADM

## 2023-09-28 RX ORDER — ISOSORBIDE DINITRATE 20 MG/1
20 TABLET ORAL 3 TIMES DAILY
Status: DISCONTINUED | OUTPATIENT
Start: 2023-09-28 | End: 2023-09-28 | Stop reason: HOSPADM

## 2023-09-28 RX ORDER — NALOXONE HCL 0.4 MG/ML
0.02 VIAL (ML) INJECTION
Status: DISCONTINUED | OUTPATIENT
Start: 2023-09-28 | End: 2023-09-28 | Stop reason: HOSPADM

## 2023-09-28 RX ORDER — ATORVASTATIN CALCIUM 40 MG/1
40 TABLET, FILM COATED ORAL DAILY
Status: DISCONTINUED | OUTPATIENT
Start: 2023-09-29 | End: 2023-09-28 | Stop reason: HOSPADM

## 2023-09-28 RX ORDER — GLUCAGON 1 MG
1 KIT INJECTION
Status: DISCONTINUED | OUTPATIENT
Start: 2023-09-28 | End: 2023-09-28 | Stop reason: HOSPADM

## 2023-09-28 RX ORDER — CALCITRIOL 0.25 UG/1
0.25 CAPSULE ORAL EVERY OTHER DAY
Status: DISCONTINUED | OUTPATIENT
Start: 2023-09-29 | End: 2023-09-28 | Stop reason: HOSPADM

## 2023-09-28 RX ORDER — SODIUM CHLORIDE 0.9 % (FLUSH) 0.9 %
10 SYRINGE (ML) INJECTION EVERY 12 HOURS PRN
Status: DISCONTINUED | OUTPATIENT
Start: 2023-09-28 | End: 2023-09-28 | Stop reason: HOSPADM

## 2023-09-28 RX ORDER — IBUPROFEN 200 MG
16 TABLET ORAL
Status: DISCONTINUED | OUTPATIENT
Start: 2023-09-28 | End: 2023-09-28 | Stop reason: HOSPADM

## 2023-09-28 RX ORDER — SEVELAMER CARBONATE 800 MG/1
800 TABLET, FILM COATED ORAL
Status: DISCONTINUED | OUTPATIENT
Start: 2023-09-28 | End: 2023-09-28 | Stop reason: HOSPADM

## 2023-09-28 RX ORDER — LABETALOL HCL 20 MG/4 ML
10 SYRINGE (ML) INTRAVENOUS EVERY 4 HOURS PRN
Status: DISCONTINUED | OUTPATIENT
Start: 2023-09-28 | End: 2023-09-28 | Stop reason: HOSPADM

## 2023-09-28 RX ORDER — IBUPROFEN 200 MG
24 TABLET ORAL
Status: DISCONTINUED | OUTPATIENT
Start: 2023-09-28 | End: 2023-09-28 | Stop reason: HOSPADM

## 2023-09-28 RX ORDER — AMLODIPINE BESYLATE 10 MG/1
10 TABLET ORAL NIGHTLY
Status: DISCONTINUED | OUTPATIENT
Start: 2023-09-28 | End: 2023-09-28 | Stop reason: HOSPADM

## 2023-09-28 RX ORDER — HYDRALAZINE HYDROCHLORIDE 20 MG/ML
10 INJECTION INTRAMUSCULAR; INTRAVENOUS EVERY 4 HOURS PRN
Status: DISCONTINUED | OUTPATIENT
Start: 2023-09-28 | End: 2023-09-28 | Stop reason: HOSPADM

## 2023-09-28 RX ORDER — NAPROXEN SODIUM 220 MG/1
81 TABLET, FILM COATED ORAL DAILY
Status: DISCONTINUED | OUTPATIENT
Start: 2023-09-29 | End: 2023-09-28 | Stop reason: HOSPADM

## 2023-09-28 RX ORDER — HYDROCODONE BITARTRATE AND ACETAMINOPHEN 5; 325 MG/1; MG/1
1 TABLET ORAL EVERY 8 HOURS PRN
Status: DISCONTINUED | OUTPATIENT
Start: 2023-09-28 | End: 2023-09-28 | Stop reason: HOSPADM

## 2023-09-28 RX ORDER — CARVEDILOL 12.5 MG/1
25 TABLET ORAL 2 TIMES DAILY
Status: DISCONTINUED | OUTPATIENT
Start: 2023-09-28 | End: 2023-09-28 | Stop reason: HOSPADM

## 2023-09-28 NOTE — NURSING
09/28/23 1332        Hemodialysis Catheter right subclavian   No placement date or time found.   Present Prior to Hospital Arrival?: Yes  Hand Hygiene: Performed  Location: right subclavian   Line Necessity Review CRRT/HD   Site Assessment No drainage;No redness;No swelling;No warmth   Line Securement Device Secured with sutures   Dressing Type CHG impregnated dressing/sponge;Central line dressing   Dressing Status Clean;Dry;Intact   Dressing Intervention Sterile dressing change   Date on Dressing 09/28/23   Venous Patency/Care blood return present;flushed w/o difficulty;normal saline locked;intermittent infusion cap changed   Arterial Patency/Care blood return present;flushed w/o difficulty;normal saline locked;intermittent infusion cap changed   Post-Hemodialysis Assessment   Rinseback Volume (mL) 500 mL   Blood Volume Processed (Liters) 62.8 L   Dialyzer Clearance Lightly streaked   Duration of Treatment 180 minutes   Total UF (mL) 2500 mL   Net Fluid Removal 2000   Patient Response to Treatment Tolerated well   Post-Hemodialysis Comments Tx completed, pt reinfused. CVC deaccessed per P&P, lines flushed and locked with NS and new Clear Guard caps applied. Pt ran for 3 hours, NET removed= 2000ml. Dressing changed.

## 2023-09-28 NOTE — ED PROVIDER NOTES
Encounter Date: 9/28/2023       History     Chief Complaint   Patient presents with    Emergency Dialysis     Pt requesting dialysis, last tx on Monday. Voices no complaints.       Presents requesting dialysis, states CKD unable to schedule outpatient dialysis. Last dialysis procedure Monday, states was instructed by Nephrology to come today.  Denies symptoms    The history is provided by the patient.     Review of patient's allergies indicates:  No Known Allergies  Past Medical History:   Diagnosis Date    Renal disorder      Past Surgical History:   Procedure Laterality Date    INSERTION OF TUNNELED CENTRAL VENOUS HEMODIALYSIS CATHETER N/A 9/19/2023    Procedure: Insertion, Catheter, Central Venous, Hemodialysis;  Surgeon: Theodore Benitez MD;  Location: [facility] CATH LAB;  Service: Peripheral Vascular;  Laterality: N/A;     No family history on file.  Social History     Tobacco Use    Smoking status: Never    Smokeless tobacco: Never   Substance Use Topics    Alcohol use: Never    Drug use: Never     Review of Systems   Constitutional:  Negative for fever.   HENT:  Negative for sore throat.    Respiratory:  Negative for shortness of breath.    Cardiovascular:  Positive for leg swelling. Negative for chest pain.   Gastrointestinal:  Negative for nausea.   Genitourinary:  Negative for dysuria.   Musculoskeletal:  Negative for back pain.   Skin:  Negative for rash.   Neurological:  Negative for weakness.   Hematological:  Does not bruise/bleed easily.   All other systems reviewed and are negative.      Physical Exam     Initial Vitals [09/28/23 0724]   BP Pulse Resp Temp SpO2   (!) 109/51 65 16 97.7 °F (36.5 °C) 99 %      MAP       --         Physical Exam    Nursing note and vitals reviewed.  Constitutional: He appears well-developed. No distress.   HENT:   Head: Normocephalic and atraumatic.   Eyes: Conjunctivae and EOM are normal. Pupils are equal, round, and reactive to light.   Neck: Neck supple. No JVD present.    Normal range of motion.  Cardiovascular:  Normal rate, regular rhythm, normal heart sounds and intact distal pulses.           Pulmonary/Chest: Breath sounds normal. No respiratory distress.   Rt subclavian dialysis catheter   Abdominal: Abdomen is soft. Bowel sounds are normal. He exhibits no distension. There is no abdominal tenderness. There is no rebound and no guarding.   Musculoskeletal:         General: Edema (Bilateral +2) present. Normal range of motion.      Cervical back: Normal range of motion and neck supple.     Neurological: He is alert and oriented to person, place, and time. He has normal strength. GCS score is 15. GCS eye subscore is 4. GCS verbal subscore is 5. GCS motor subscore is 6.   Skin: Skin is warm and dry. No rash noted.   Psychiatric: His behavior is normal.         ED Course   Procedures  Labs Reviewed   COMPREHENSIVE METABOLIC PANEL - Abnormal; Notable for the following components:       Result Value    Sodium Level 130 (*)     Carbon Dioxide 22 (*)     Glucose Level 117 (*)     Blood Urea Nitrogen 35.5 (*)     Creatinine 8.44 (*)     Calcium Level Total 8.2 (*)     Protein Total 5.6 (*)     Albumin Level 2.7 (*)     Albumin/Globulin Ratio 0.9 (*)     All other components within normal limits   PHOSPHORUS - Abnormal; Notable for the following components:    Phosphorus Level 4.9 (*)     All other components within normal limits   MAGNESIUM - Normal   CBC W/ AUTO DIFFERENTIAL    Narrative:     The following orders were created for panel order CBC auto differential.  Procedure                               Abnormality         Status                     ---------                               -----------         ------                     CBC with Differential[[id number]]                           In process                   Please view results for these tests on the individual orders.   CBC WITH DIFFERENTIAL   EXTRA TUBES    Narrative:     The following orders were created for panel  order EXTRA TUBES.  Procedure                               Abnormality         Status                     ---------                               -----------         ------                     Light Blue Top Hold[[id number]]                             In process                 Gold Top Hold[[id number]]                                   In process                   Please view results for these tests on the individual orders.   LIGHT BLUE TOP HOLD   GOLD TOP HOLD          Imaging Results              X-Ray Chest 1 View (Final result)  Result time 09/28/23 08:00:39      Final result by Vincenzo Winn MD (09/28/23 08:00:39)                   Impression:      Increasing right lower lobe opacification.  Developing infectious process is not excluded.      Electronically signed by: Vincenzo Winn  Date:    09/28/2023  Time:    08:00               Narrative:    EXAMINATION:  XR CHEST 1 VIEW    CLINICAL HISTORY:  end stage renal disease;    TECHNIQUE:  Single view of the chest    COMPARISON:  09/25/2023    FINDINGS:  Tunneled right-sided catheter is unchanged with increase in right lower lobe opacification.                                       Medications - No data to display  Medical Decision Making  Amount and/or Complexity of Data Reviewed  Labs: ordered. Decision-making details documented in ED Course.  Radiology: ordered and independent interpretation performed. Decision-making details documented in ED Course.  Discussion of management or test interpretation with external provider(s): 8:38 AM Consult: I discussed the case with Dr. Shearer (Hospital Medicine). Agrees with current management.   Recommends will consult hospital medicine                                 Clinical Impression:   Final diagnoses:  [N18.6, Z99.2] CKD (chronic kidney disease) requiring chronic dialysis (Primary)        ED Disposition Condition    Observation Stable                Max Stacy MD  09/28/23 7000

## 2023-09-28 NOTE — CONSULTS
Nephrology Consultation    Date of Consultation: September 28, 2023    Consultation Requested By: Dr. Melara    Reason for Consultation: Hemodialysis needs during hospitalization    History of Present Illness:  This is a 63 y.o. male with ESRD requiring HD. Patient unable to be placed in outpatient Hd unit due to insurance issues. Renal service consulted for HD while patient hospitalized.    Review of patient's allergies indicates:  No Known Allergies    Review of systems: 12 point review of systems conducted, negative except as stated in the HPI.     Past Medical History: HTN  Past Medical History:   Diagnosis Date    Renal disorder        Procedure History:   Past Surgical History:   Procedure Laterality Date    INSERTION OF TUNNELED CENTRAL VENOUS HEMODIALYSIS CATHETER N/A 9/19/2023    Procedure: Insertion, Catheter, Central Venous, Hemodialysis;  Surgeon: Theodore Benitez MD;  Location: [facility] CATH LAB;  Service: Peripheral Vascular;  Laterality: N/A;       Family History: reviewed, noncontributory for this admission    Social History:  reports that he has never smoked. He has never used smokeless tobacco. He reports that he does not drink alcohol and does not use drugs.    Home Medications:   (Not in a hospital admission)      Inpatient Medications:   No current facility-administered medications for this encounter.    Current Outpatient Medications:     amLODIPine (NORVASC) 10 MG tablet, Take 1 tablet (10 mg total) by mouth every evening., Disp: 90 tablet, Rfl: 3    aspirin 81 MG Chew, Take 1 tablet (81 mg total) by mouth once daily., Disp: 90 tablet, Rfl: 3    atorvastatin (LIPITOR) 40 MG tablet, Take 1 tablet (40 mg total) by mouth once daily., Disp: 90 tablet, Rfl: 3    baclofen (LIORESAL) 10 MG tablet, Take 1 tablet (10 mg total) by mouth 2 (two) times daily. for 20 doses, Disp: 20 tablet, Rfl: 0    calcitRIOL (ROCALTROL) 0.25 MCG Cap, Take 1 capsule (0.25 mcg total) by mouth every other day., Disp: 90  capsule, Rfl: 3    carvediloL (COREG) 25 MG tablet, Take 1 tablet (25 mg total) by mouth 2 (two) times daily., Disp: 60 tablet, Rfl: 11    hydrALAZINE (APRESOLINE) 25 MG tablet, Take 3 tablets (75 mg total) by mouth 3 (three) times daily., Disp: 270 tablet, Rfl: 11    HYDROcodone-acetaminophen (NORCO) 5-325 mg per tablet, Take 1 tablet by mouth every 8 (eight) hours as needed for Pain., Disp: 6 tablet, Rfl: 0    isosorbide dinitrate (ISORDIL) 20 MG tablet, Take 1 tablet (20 mg total) by mouth 3 (three) times daily., Disp: 90 tablet, Rfl: 11    LIDOcaine (LIDODERM) 5 %, Place 1 patch onto the skin once daily. Remove & Discard patch within 12 hours or as directed by MD for 12 doses, Disp: 12 patch, Rfl: 0    sevelamer carbonate (RENVELA) 800 mg Tab, Take 1 tablet (800 mg total) by mouth 3 (three) times daily with meals., Disp: 90 tablet, Rfl: 11     Laboratory Data:   Recent Results (from the past 24 hour(s))   Comprehensive metabolic panel    Collection Time: 09/28/23  7:55 AM   Result Value Ref Range    Sodium Level 130 (L) 136 - 145 mmol/L    Potassium Level 4.7 3.5 - 5.1 mmol/L    Chloride 99 98 - 107 mmol/L    Carbon Dioxide 22 (L) 23 - 31 mmol/L    Glucose Level 117 (H) 82 - 115 mg/dL    Blood Urea Nitrogen 35.5 (H) 8.4 - 25.7 mg/dL    Creatinine 8.44 (H) 0.73 - 1.18 mg/dL    Calcium Level Total 8.2 (L) 8.8 - 10.0 mg/dL    Protein Total 5.6 (L) 5.8 - 7.6 gm/dL    Albumin Level 2.7 (L) 3.4 - 4.8 g/dL    Globulin 2.9 2.4 - 3.5 gm/dL    Albumin/Globulin Ratio 0.9 (L) 1.1 - 2.0 ratio    Bilirubin Total 0.3 <=1.5 mg/dL    Alkaline Phosphatase 49 40 - 150 unit/L    Alanine Aminotransferase 19 0 - 55 unit/L    Aspartate Aminotransferase 12 5 - 34 unit/L    eGFR 7 mls/min/1.73/m2   Magnesium    Collection Time: 09/28/23  7:55 AM   Result Value Ref Range    Magnesium Level 2.20 1.60 - 2.60 mg/dL   Phosphorus    Collection Time: 09/28/23  7:55 AM   Result Value Ref Range    Phosphorus Level 4.9 (H) 2.3 - 4.7 mg/dL   CBC  with Differential    Collection Time: 09/28/23  7:55 AM   Result Value Ref Range    WBC 6.60 4.50 - 11.50 x10(3)/mcL    RBC 3.84 (L) 4.70 - 6.10 x10(6)/mcL    Hgb 9.6 (L) 14.0 - 18.0 g/dL    Hct 29.8 (L) 42.0 - 52.0 %    MCV 77.6 (L) 80.0 - 94.0 fL    MCH 25.0 (L) 27.0 - 31.0 pg    MCHC 32.2 (L) 33.0 - 36.0 g/dL    RDW 14.6 11.5 - 17.0 %    Platelet 240 130 - 400 x10(3)/mcL    MPV 9.0 7.4 - 10.4 fL    Neut % 66.9 %    Lymph % 16.5 %    Mono % 11.2 %    Eos % 4.4 %    Basophil % 0.8 %    Lymph # 1.09 0.6 - 4.6 x10(3)/mcL    Neut # 4.42 2.1 - 9.2 x10(3)/mcL    Mono # 0.74 0.1 - 1.3 x10(3)/mcL    Eos # 0.29 0 - 0.9 x10(3)/mcL    Baso # 0.05 <=0.2 x10(3)/mcL    IG# 0.01 0 - 0.04 x10(3)/mcL    IG% 0.2 %    NRBC% 0.0 %       Imaging:  X-Ray Chest 1 View   Final Result      Increasing right lower lobe opacification.  Developing infectious process is not excluded.         Electronically signed by: Vincenzo Winn   Date:    09/28/2023   Time:    08:00          Physical Exam:   /63   Pulse 64   Temp 97.7 °F (36.5 °C) (Tympanic)   Resp 18   Wt 53.4 kg (117 lb 13.4 oz)   SpO2 100%   BMI 21.55 kg/m²  Body mass index is 21.55 kg/m².  General: alert and oriented x 3 NAD  HEENT: Normocephalic atraumatic,   Neck: no JVD bruits  Lungs: CTA bilaterally  CVS: RRR, no m/r/g  Abdomen: positive bowel sounds all 4 quadrants  Extremities: no clubbing cyanosis. Trace edema lower extremities bilaterally  Neurologic: CN 2-12 intact    Impression/Plan:  ESRD: HD today for 3 hours, remove 1-2 liters as tolerated.  Hypertension:well controlled. 2 gm sodium diet, continue anithypertensive medications regimen.  Brittany Hyman M.D.  Nephrology    Thank you very much for your consultation

## 2023-09-28 NOTE — H&P
[facility]  History & Physical      Patient Name: Ryann Ly  : 1960  MRN: [id number]  Patient Class: OP- Observation   Admission Date: 2023   Length of Stay: 0  Admitting Service: Hospital Medicine  Attending Physician: Stephanie Giraldo MD  PCP: Deb, Primary Doctor  Source of history: Patient, EMR, interpretor.    Code status: Full Code    Chief Complaint   Emergency Dialysis (Pt requesting dialysis, last tx on Monday. Voices no complaints.  )    History of Present Illness   63 y.o. male with hx of ESRD on dialysis presents to ED for for routine dialysis. Dialysis schedule was 3 days per week, and has been decreased to twice a week on  and . Pt is an undocumented  immigrant and typically gets dialyzed in the ED. He reports minimal LE swelling. Denies CP, SOB, N/V, abdominal pain. Does still currently make urine and denies new or worsening urinary sxs. VSS in ED. Admitted to Internal Medicine services for dialysis.     ROS   Pertinent positive and negative as mentioned in HPI   Review of Systems   Constitutional:  Negative for chills and fever.   Eyes:  Negative for blurred vision.   Respiratory:  Negative for cough and shortness of breath.    Cardiovascular:  Positive for leg swelling. Negative for chest pain and palpitations.   Gastrointestinal:  Negative for abdominal pain, nausea and vomiting.   Genitourinary:  Negative for dysuria and hematuria.   Neurological:  Negative for dizziness, weakness and headaches.     Past Medical History     Past Medical History:   Diagnosis Date    Renal disorder      Past Surgical History     Past Surgical History:   Procedure Laterality Date    INSERTION OF TUNNELED CENTRAL VENOUS HEMODIALYSIS CATHETER N/A 2023    Procedure: Insertion, Catheter, Central Venous, Hemodialysis;  Surgeon: Theodore Benitez MD;  Location: [facility] CATH LAB;  Service: Peripheral Vascular;  Laterality: N/A;     Social History     Social  History     Tobacco Use    Smoking status: Never    Smokeless tobacco: Never   Substance Use Topics    Alcohol use: Never      Family History   Reviewed and noncontributory    Allergies   Patient has no known allergies.  Home Medications     Prior to Admission medications    Medication Sig Start Date End Date Taking? Authorizing Provider   amLODIPine (NORVASC) 10 MG tablet Take 1 tablet (10 mg total) by mouth every evening. 9/20/23 9/19/24  Abner Weaver MD   aspirin 81 MG Chew Take 1 tablet (81 mg total) by mouth once daily. 9/21/23 9/20/24  Abner Weaver MD   atorvastatin (LIPITOR) 40 MG tablet Take 1 tablet (40 mg total) by mouth once daily. 9/20/23 9/19/24  Abner Weaver MD   baclofen (LIORESAL) 10 MG tablet Take 1 tablet (10 mg total) by mouth 2 (two) times daily. for 20 doses 9/27/23 10/7/23  Jdue Schreiber PA   calcitRIOL (ROCALTROL) 0.25 MCG Cap Take 1 capsule (0.25 mcg total) by mouth every other day. 9/22/23   Abner Weaver MD   carvediloL (COREG) 25 MG tablet Take 1 tablet (25 mg total) by mouth 2 (two) times daily. 9/20/23 9/19/24  Abner Weaver MD   hydrALAZINE (APRESOLINE) 25 MG tablet Take 3 tablets (75 mg total) by mouth 3 (three) times daily. 9/20/23 9/19/24  Abner Weaver MD   HYDROcodone-acetaminophen (NORCO) 5-325 mg per tablet Take 1 tablet by mouth every 8 (eight) hours as needed for Pain. 9/27/23   Jude Schreiber PA   isosorbide dinitrate (ISORDIL) 20 MG tablet Take 1 tablet (20 mg total) by mouth 3 (three) times daily. 9/20/23 9/19/24  Abner Weaver MD   LIDOcaine (LIDODERM) 5 % Place 1 patch onto the skin once daily. Remove & Discard patch within 12 hours or as directed by MD for 12 doses 9/27/23 10/9/23  Jude Schreiber PA   sevelamer carbonate (RENVELA) 800 mg Tab Take 1 tablet (800 mg total) by mouth 3 (three) times daily with meals. 9/20/23 9/19/24  Abner Weaver MD      Inpatient Medications   Scheduled Meds   amLODIPine  10 mg Oral QHS    [START ON 9/29/2023] aspirin  81 mg Oral  "Daily    [START ON 9/29/2023] atorvastatin  40 mg Oral Daily    [START ON 9/29/2023] calcitRIOL  0.25 mcg Oral Every other day    carvediloL  25 mg Oral BID    heparin (porcine)  5,000 Units Subcutaneous Q12H    hydrALAZINE  75 mg Oral TID    isosorbide dinitrate  20 mg Oral TID    sevelamer carbonate  800 mg Oral TID WM     Continuous Infusions    PRN Meds  dextrose 10%, dextrose 10%, glucagon (human recombinant), glucose, glucose, hydrALAZINE, HYDROcodone-acetaminophen, labetalol, naloxone, sodium chloride 0.9%    Physical Exam   Vital Signs  Temp:  [97.7 °F (36.5 °C)]   Pulse:  [55-70]   Resp:  [13-18]   BP: (108-109)/(51-63)   SpO2:  [99 %-100 %]       General: Appears comfortable, actively receiving dialysis in no acute distress  HEENT: NC/AT  CVS: Regular rhythm. Normal S1/S2.  Pulm: CTAB, no wheezing, non-labored breathing  Abdomen: nondistended, normoactive BS, soft and non-tender.  MSK: 1+ pitting edema to distal BLE. Pulses intact.  Skin: Warm and dry  Neuro: AAOx3, no focal neurological deficit. CN II-XII grossly intact   Psych: Cooperative    Labs   I have reviewed the following results below:  CBC  Recent Labs     09/28/23  0755   WBC 6.60   RBC 3.84*   HGB 9.6*   HCT 29.8*   MCV 77.6*   MCH 25.0*   MCHC 32.2*   RDW 14.6        Anemia  No results for input(s): "HAPTOGLOBIN", "FERRITIN", "IRON", "TIBC" in the last 72 hours.  Coags  No results for input(s): "INR", "APTT", "D-DIMER" in the last 72 hours.  Cardiac  No results for input(s): "BNP", "CPK", "CKMB", "TROPONINI" in the last 72 hours.  ABG/Lactate  No results for input(s): "PH", "PCO2", "PO2", "HCO3", "POCSATURATED", "BE", "LACTIC" in the last 72 hours.   Urinalysis  No results for input(s): "COLORUA", "APPEARANCEUA", "SGUA", "PHUA", "PROTEINUA", "GLUCOSEUA", "KETONESUA", "BLOODUA", "UROBILINOGEN", "NITRITESUA", "LEUKOCYTESUR" in the last 72 hours.   [facility]  Recent Labs     09/25/23  1633 09/28/23  0755   * 130*   K 3.7 4.7   CHLORIDE " "100 99   CO2 21* 22*   BUN 13.9 35.5*   CREATININE 3.95* 8.44*   GLUCOSE 88 117*   CALCIUM 8.9 8.2*   MG  --  2.20   PHOS  --  4.9*     LFTs  Recent Labs     09/28/23  0755   ALBUMIN 2.7*   GLOBULIN 2.9   ALKPHOS 49   ALT 19   AST 12   BILITOT 0.3     Inflammatory Markers  No results for input(s): "CRP", "LDH", "ESR" in the last 72 hours.  Lipid  No results for input(s): "CHOL", "TRIG", "LDL", "VLDL", "HDL" in the last 72 hours.  Diabetes  Recent Labs     09/25/23  1633 09/28/23  0755   GLUCOSE 88 117*     Thyroid  No results for input(s): "TSH", "FREET4" in the last 72 hours.       Microbiology Results (last 7 days)       ** No results found for the last 168 hours. **           Imaging     X-Ray Chest 1 View   Final Result      Increasing right lower lobe opacification.  Developing infectious process is not excluded.         Electronically signed by: Vincenzo Winn   Date:    09/28/2023   Time:    08:00        Assessment & Plan     ESRD on dialysis  Hyponatremia  -Reports dialysis schedule has changed from [facility] to Monday and Thursday  -sodium 130. Asymptomatic.  -nephrology consulted for dialysis  -suspect patient will be able to discharge home after dialysis     Hypertension  -/51 on admission  -Continue home amlodipine 10 mg daily, Coreg 25 mg b.i.d., hydralazine 75 mg t.i.d.     Access: peripheral  Antibiotics: none  Diet: Renal  DVT Prophylaxis: SCDs, Heparin   GI Prophylaxis: none  Fluids: none    Sarah Sun MD  Family Medicine, [facility]    "

## 2023-09-28 NOTE — FIRST PROVIDER EVALUATION
Medical screening examination initiated.  I have conducted a focused provider triage encounter, findings are as follows:    Brief history of present illness:  requests dialysis    Vitals:    09/28/23 0724   BP: (!) 109/51   BP Location: Left arm   Patient Position: Sitting   Pulse: 65   Resp: 16   Temp: 97.7 °F (36.5 °C)   TempSrc: Tympanic   SpO2: 99%   Weight: 53.4 kg (117 lb 13.4 oz)       Pertinent physical exam:  deferred    Brief workup plan:  labwork    Preliminary workup initiated; this workup will be continued and followed by the physician or advanced practice provider that is assigned to the patient when roomed.

## 2023-09-28 NOTE — DISCHARGE SUMMARY
[facility] Emergency Dept  Hospital Medicine  Discharge Summary      Patient Name: Ryann Ly  MRN: [id number]  TANIA: [id number]  Patient Class: OP- Outpatient  Admission Date: 9/28/2023  Hospital Length of Stay: 0 days  Discharge Date and Time: No discharge date for patient encounter.  Attending Physician: Stephanie Giraldo MD   Discharging Provider: Evangelina Whitfield MD  Primary Care Provider: No primary care provider on file.    Primary Care Team: Networked reference to record PCT     HPI:     63 y.o. male with hx of ESRD on dialysis presents to ED for for routine dialysis. Dialysis schedule was 3 days per week, and has been decreased to twice a week on Mondays and Thursdays. Pt is an undocumented  immigrant and typically gets dialyzed in the ED. He reports minimal LE swelling. Denies CP, SOB, N/V, abdominal pain. Does still currently make urine and denies new or worsening urinary sxs. VSS in ED. Admitted to Internal Medicine services for dialysis.     Hospital Course:   Patient was admitted to the hospital for usual scheduled dialysis.  Nephrology was consulted and dialysis initiated. Patient tolerated 3 hr session with 2 L fluid removed. Discharge patient home at this time.  Patient given strict ED return precautions. Case management consulted to assist with transportation.     Goals of Care Treatment Preferences:  Code Status: Full Code      Consults:   Consults (From admission, onward)        Status Ordering Provider     Inpatient consult to Social Work/Case Management  Once        Provider:  (Not yet assigned)    Ordered EVANGELINA WHITFIELD     Inpatient consult to Hospitalist  Once        Provider:  Luis Shearer MD    Acknowledged MARY ESPAÑA          No new Assessment & Plan notes have been filed under this hospital service since the last note was generated.  Service: Hospital Medicine    Final Active Diagnoses:    Diagnosis Date Noted POA    PRINCIPAL PROBLEM:  ESRD  (end stage renal disease) [N18.6] 09/23/2023 Yes      Problems Resolved During this Admission:       Discharged Condition: good    Disposition: Home or Self Care    Follow Up:   Follow-up Information     Adeola Diaz FNP. Go on 10/11/2023.    Specialty: Family Medicine  Contact information:  [address] [facility] [address]  [phone]                       Patient Instructions:   No discharge procedures on file.    Significant Diagnostic Studies: Labs:   CMP   Recent Labs   Lab 09/28/23 0755   *   K 4.7   CO2 22*   BUN 35.5*   CREATININE 8.44*   CALCIUM 8.2*   ALBUMIN 2.7*   BILITOT 0.3   ALKPHOS 49   AST 12   ALT 19    and CBC   Recent Labs   Lab 09/28/23 0755   WBC 6.60   HGB 9.6*   HCT 29.8*          Pending Diagnostic Studies:     Procedure Component Value Units Date/Time    EXTRA TUBES [[id number]] Collected: 09/28/23 0758    Order Status: Sent Lab Status: In process Updated: 09/28/23 0758    Specimen: Blood, Venous     Narrative:      The following orders were created for panel order EXTRA TUBES.  Procedure                               Abnormality         Status                     ---------                               -----------         ------                     Light Blue Top Hold[[id number]]                             In process                 Gold Top Hold[[id number]]                                   In process                   Please view results for these tests on the individual orders.         Medications:  Reconciled Home Medications:      Medication List      CONTINUE taking these medications    amLODIPine 10 MG tablet  Commonly known as: NORVASC  Take 1 tablet (10 mg total) by mouth every evening.     aspirin 81 MG Chew  Take 1 tablet (81 mg total) by mouth once daily.     atorvastatin 40 MG tablet  Commonly known as: LIPITOR  Take 1 tablet (40 mg total) by mouth once daily.     baclofen 10 MG tablet  Commonly known as: LIORESAL  Take 1 tablet (10 mg total) by  mouth 2 (two) times daily. for 20 doses     calcitRIOL 0.25 MCG Cap  Commonly known as: ROCALTROL  Take 1 capsule (0.25 mcg total) by mouth every other day.     carvediloL 25 MG tablet  Commonly known as: COREG  Take 1 tablet (25 mg total) by mouth 2 (two) times daily.     hydrALAZINE 25 MG tablet  Commonly known as: APRESOLINE  Take 3 tablets (75 mg total) by mouth 3 (three) times daily.     HYDROcodone-acetaminophen 5-325 mg per tablet  Commonly known as: NORCO  Take 1 tablet by mouth every 8 (eight) hours as needed for Pain.     isosorbide dinitrate 20 MG tablet  Commonly known as: ISORDIL  Take 1 tablet (20 mg total) by mouth 3 (three) times daily.     LIDOcaine 5 %  Commonly known as: LIDODERM  Place 1 patch onto the skin once daily. Remove & Discard patch within 12 hours or as directed by MD for 12 doses     sevelamer carbonate 800 mg Tab  Commonly known as: RENVELA  Take 1 tablet (800 mg total) by mouth 3 (three) times daily with meals.            Indwelling Lines/Drains at time of discharge:   Lines/Drains/Airways     Central Venous Catheter Line  Duration                Hemodialysis Catheter right subclavian -- days                Time spent on the discharge of patient: >30  minutes         Evangelina Bansal M.D.  [facility] HO-3

## 2023-09-30 ENCOUNTER — HOSPITAL ENCOUNTER (EMERGENCY)
Facility: HOSPITAL | Age: 63
Discharge: HOME | End: 2023-09-30
Attending: INTERNAL MEDICINE

## 2023-09-30 VITALS
WEIGHT: 110.25 LBS | BODY MASS INDEX: 20.29 KG/M2 | HEART RATE: 65 BPM | HEIGHT: 62 IN | OXYGEN SATURATION: 100 % | RESPIRATION RATE: 17 BRPM | DIASTOLIC BLOOD PRESSURE: 83 MMHG | SYSTOLIC BLOOD PRESSURE: 149 MMHG | TEMPERATURE: 98 F

## 2023-09-30 VITALS
OXYGEN SATURATION: 96 % | TEMPERATURE: 97 F | DIASTOLIC BLOOD PRESSURE: 94 MMHG | WEIGHT: 110.44 LBS | HEART RATE: 84 BPM | RESPIRATION RATE: 18 BRPM | SYSTOLIC BLOOD PRESSURE: 166 MMHG | BODY MASS INDEX: 20.32 KG/M2 | HEIGHT: 62 IN

## 2023-09-30 DIAGNOSIS — Z99.2 CKD (CHRONIC KIDNEY DISEASE) REQUIRING CHRONIC DIALYSIS: ICD-10-CM

## 2023-09-30 DIAGNOSIS — S00.05XA FOREIGN BODY OF SKIN OF SCALP, INITIAL ENCOUNTER: ICD-10-CM

## 2023-09-30 DIAGNOSIS — S01.01XA SCALP LACERATION, INITIAL ENCOUNTER: ICD-10-CM

## 2023-09-30 DIAGNOSIS — N18.6 CKD (CHRONIC KIDNEY DISEASE) REQUIRING CHRONIC DIALYSIS: ICD-10-CM

## 2023-09-30 DIAGNOSIS — S09.90XA INJURY OF HEAD, INITIAL ENCOUNTER: Primary | ICD-10-CM

## 2023-09-30 DIAGNOSIS — I10 UNCONTROLLED HYPERTENSION: Primary | ICD-10-CM

## 2023-09-30 LAB
ALBUMIN SERPL-MCNC: 3.4 G/DL (ref 3.4–4.8)
ALBUMIN/GLOB SERPL: 0.9 RATIO (ref 1.1–2)
ALP SERPL-CCNC: 59 UNIT/L (ref 40–150)
ALT SERPL-CCNC: 20 UNIT/L (ref 0–55)
AST SERPL-CCNC: 13 UNIT/L (ref 5–34)
BILIRUB SERPL-MCNC: 0.5 MG/DL
BUN SERPL-MCNC: 37 MG/DL (ref 8.4–25.7)
CALCIUM SERPL-MCNC: 9.6 MG/DL (ref 8.8–10)
CHLORIDE SERPL-SCNC: 98 MMOL/L (ref 98–107)
CO2 SERPL-SCNC: 24 MMOL/L (ref 23–31)
CREAT SERPL-MCNC: 8.29 MG/DL (ref 0.73–1.18)
GFR SERPLBLD CREATININE-BSD FMLA CKD-EPI: 7 MLS/MIN/1.73/M2
GLOBULIN SER-MCNC: 3.7 GM/DL (ref 2.4–3.5)
GLUCOSE SERPL-MCNC: 114 MG/DL (ref 82–115)
MAGNESIUM SERPL-MCNC: 2.5 MG/DL (ref 1.6–2.6)
PHOSPHATE SERPL-MCNC: 4.8 MG/DL (ref 2.3–4.7)
POTASSIUM SERPL-SCNC: 4.5 MMOL/L (ref 3.5–5.1)
PROT SERPL-MCNC: 7.1 GM/DL (ref 5.8–7.6)
SODIUM SERPL-SCNC: 136 MMOL/L (ref 136–145)

## 2023-09-30 PROCEDURE — 80053 COMPREHEN METABOLIC PANEL: CPT | Performed by: INTERNAL MEDICINE

## 2023-09-30 PROCEDURE — 99283 EMERGENCY DEPT VISIT LOW MDM: CPT | Mod: 25

## 2023-09-30 PROCEDURE — 25000003 PHARM REV CODE 250: Performed by: INTERNAL MEDICINE

## 2023-09-30 PROCEDURE — 90471 IMMUNIZATION ADMIN: CPT | Performed by: INTERNAL MEDICINE

## 2023-09-30 PROCEDURE — 63600175 PHARM REV CODE 636 W HCPCS: Performed by: INTERNAL MEDICINE

## 2023-09-30 PROCEDURE — 83735 ASSAY OF MAGNESIUM: CPT | Performed by: INTERNAL MEDICINE

## 2023-09-30 PROCEDURE — 90715 TDAP VACCINE 7 YRS/> IM: CPT | Performed by: INTERNAL MEDICINE

## 2023-09-30 PROCEDURE — 84100 ASSAY OF PHOSPHORUS: CPT | Performed by: INTERNAL MEDICINE

## 2023-09-30 PROCEDURE — 99284 EMERGENCY DEPT VISIT MOD MDM: CPT | Mod: 25,27

## 2023-09-30 PROCEDURE — 12031 INTMD RPR S/A/T/EXT 2.5 CM/<: CPT

## 2023-09-30 RX ORDER — HYDRALAZINE HYDROCHLORIDE 25 MG/1
25 TABLET, FILM COATED ORAL
Status: COMPLETED | OUTPATIENT
Start: 2023-09-30 | End: 2023-09-30

## 2023-09-30 RX ORDER — CLONIDINE HYDROCHLORIDE 0.1 MG/1
0.1 TABLET ORAL
Status: COMPLETED | OUTPATIENT
Start: 2023-09-30 | End: 2023-09-30

## 2023-09-30 RX ORDER — LIDOCAINE HYDROCHLORIDE 10 MG/ML
10 INJECTION INFILTRATION; PERINEURAL
Status: COMPLETED | OUTPATIENT
Start: 2023-09-30 | End: 2023-09-30

## 2023-09-30 RX ADMIN — CLONIDINE HYDROCHLORIDE 0.1 MG: 0.1 TABLET ORAL at 02:09

## 2023-09-30 RX ADMIN — HYDRALAZINE HYDROCHLORIDE 25 MG: 25 TABLET ORAL at 02:09

## 2023-09-30 RX ADMIN — LIDOCAINE HYDROCHLORIDE 10 ML: 10 INJECTION, SOLUTION INFILTRATION; PERINEURAL at 06:09

## 2023-09-30 RX ADMIN — TETANUS TOXOID, REDUCED DIPHTHERIA TOXOID AND ACELLULAR PERTUSSIS VACCINE, ADSORBED 0.5 ML: 5; 2.5; 8; 8; 2.5 SUSPENSION INTRAMUSCULAR at 06:09

## 2023-09-30 NOTE — ED PROVIDER NOTES
Encounter Date: 9/30/2023       History     Chief Complaint   Patient presents with    Emergency Dialysis     States needs dialysis today sat.      Presents requesting dialysis, states unable to get outpatient dialysis for which came to ED. Last dialysis 2 days ago. Denies symptoms    The history is provided by the patient.     Review of patient's allergies indicates:  No Known Allergies  Past Medical History:   Diagnosis Date    Renal disorder      Past Surgical History:   Procedure Laterality Date    INSERTION OF TUNNELED CENTRAL VENOUS HEMODIALYSIS CATHETER N/A 9/19/2023    Procedure: Insertion, Catheter, Central Venous, Hemodialysis;  Surgeon: Theodore Benitez MD;  Location: [facility] CATH LAB;  Service: Peripheral Vascular;  Laterality: N/A;     History reviewed. No pertinent family history.  Social History     Tobacco Use    Smoking status: Never    Smokeless tobacco: Never   Substance Use Topics    Alcohol use: Never    Drug use: Never     Review of Systems   Constitutional:  Negative for fever.   HENT:  Negative for sore throat.    Respiratory:  Negative for shortness of breath.    Cardiovascular:  Negative for chest pain.   Gastrointestinal:  Negative for nausea.   Genitourinary:  Negative for dysuria.   Musculoskeletal:  Negative for back pain.   Skin:  Negative for rash.   Neurological:  Negative for weakness.   Hematological:  Does not bruise/bleed easily.   All other systems reviewed and are negative.      Physical Exam     Initial Vitals [09/30/23 1246]   BP Pulse Resp Temp SpO2   (!) 204/97 76 20 97.2 °F (36.2 °C) 98 %      MAP       --         Physical Exam    Nursing note and vitals reviewed.  Constitutional: He appears well-developed. No distress.   HENT:   Head: Normocephalic and atraumatic.   Eyes: Conjunctivae and EOM are normal. Pupils are equal, round, and reactive to light.   Neck: Neck supple. No JVD present.   Normal range of motion.  Cardiovascular:  Normal rate, regular rhythm, normal heart  sounds and intact distal pulses.           Pulmonary/Chest: Breath sounds normal. No respiratory distress.   Abdominal: Abdomen is soft. Bowel sounds are normal. He exhibits no distension. There is no abdominal tenderness. There is no rebound and no guarding.   Musculoskeletal:         General: No edema. Normal range of motion.      Cervical back: Normal range of motion and neck supple.     Neurological: He is alert and oriented to person, place, and time. He has normal strength. GCS score is 15. GCS eye subscore is 4. GCS verbal subscore is 5. GCS motor subscore is 6.   Skin: Skin is warm and dry. No rash noted.   Psychiatric: His behavior is normal.         ED Course   Procedures  Labs Reviewed   COMPREHENSIVE METABOLIC PANEL - Abnormal; Notable for the following components:       Result Value    Blood Urea Nitrogen 37.0 (*)     Creatinine 8.29 (*)     Globulin 3.7 (*)     Albumin/Globulin Ratio 0.9 (*)     All other components within normal limits   PHOSPHORUS - Abnormal; Notable for the following components:    Phosphorus Level 4.8 (*)     All other components within normal limits   MAGNESIUM - Normal   EXTRA TUBES    Narrative:     The following orders were created for panel order EXTRA TUBES.  Procedure                               Abnormality         Status                     ---------                               -----------         ------                     Light Blue Top Hold[[id number]]                             In process                 Red Top Hold[[id number]]                                    In process                 Lavender Top Hold[[id number]]                               In process                 Gold Top Hold[[id number]]                                   In process                   Please view results for these tests on the individual orders.   LIGHT BLUE TOP HOLD   RED TOP HOLD   LAVENDER TOP HOLD   GOLD TOP HOLD          Imaging Results    None          Medications   hydrALAZINE  tablet 25 mg (25 mg Oral Given 9/30/23 1430)   cloNIDine tablet 0.1 mg (0.1 mg Oral Given 9/30/23 1430)     Medical Decision Making  Amount and/or Complexity of Data Reviewed  External Data Reviewed: notes.     Details: Last dialysis 2 days ago  Labs: ordered. Decision-making details documented in ED Course.    Risk  Prescription drug management.                               Clinical Impression:   Final diagnoses:  [I10] Uncontrolled hypertension (Primary)  [N18.6, Z99.2] CKD (chronic kidney disease) requiring chronic dialysis        ED Disposition Condition    Discharge Stable          ED Prescriptions    None       Follow-up Information       Follow up With Specialties Details Why Contact Info Additional Information    [facility] Emergency Medicine  If symptoms worsen [address] [facility] [address] [phone]     [facility] Nephrology Nephrology In 1 week  [address] [facility] [id number] [phone] Entrance 1             Max tSacy MD  09/30/23 6225

## 2023-09-30 NOTE — ED PROVIDER NOTES
Encounter Date: 9/30/2023       History     Chief Complaint   Patient presents with    Fall    Head Injury     C/o fell while walking out of hospital and hit the concrete. Hematoma noted to right side of scalp. Stated did have some loc     Presents with head injury today. States fell while walking out of the hospital. Pt with injury to right parietal area    The history is provided by the patient.     Review of patient's allergies indicates:  No Known Allergies  Past Medical History:   Diagnosis Date    Renal disorder      Past Surgical History:   Procedure Laterality Date    INSERTION OF TUNNELED CENTRAL VENOUS HEMODIALYSIS CATHETER N/A 9/19/2023    Procedure: Insertion, Catheter, Central Venous, Hemodialysis;  Surgeon: Theodore Benitez MD;  Location: [facility] CATH LAB;  Service: Peripheral Vascular;  Laterality: N/A;     History reviewed. No pertinent family history.  Social History     Tobacco Use    Smoking status: Never    Smokeless tobacco: Never   Substance Use Topics    Alcohol use: Never    Drug use: Never     Review of Systems   Constitutional:  Negative for fever.   HENT:  Negative for sore throat.    Respiratory:  Negative for shortness of breath.    Cardiovascular:  Negative for chest pain.   Gastrointestinal:  Negative for nausea.   Genitourinary:  Negative for dysuria.   Musculoskeletal:  Negative for back pain.   Skin:  Positive for wound. Negative for rash.   Neurological:  Positive for headaches. Negative for seizures, syncope, speech difficulty and weakness.   Hematological:  Does not bruise/bleed easily.   All other systems reviewed and are negative.      Physical Exam     Initial Vitals [09/30/23 1655]   BP Pulse Resp Temp SpO2   (!) 159/82 64 20 98.1 °F (36.7 °C) 99 %      MAP       --         Physical Exam    Nursing note and vitals reviewed.  Constitutional: He appears well-developed and well-nourished. No distress.   HENT:   Head: Normocephalic.   Right Ear: External ear normal.   Left Ear:  External ear normal.   Nose: Nose normal.   Mouth/Throat: Oropharynx is clear and moist.   Right parietal area 1 cm laceration with a rock embedded, no active bleeding   Eyes: Conjunctivae and EOM are normal. Pupils are equal, round, and reactive to light.   Neck: Neck supple. No JVD present.   Normal range of motion.  Cardiovascular:  Normal rate, regular rhythm, normal heart sounds and intact distal pulses.           Pulmonary/Chest: Breath sounds normal. No respiratory distress.   Abdominal: Abdomen is soft. Bowel sounds are normal. He exhibits no distension. There is no abdominal tenderness. There is no rebound and no guarding.   Musculoskeletal:         General: No edema. Normal range of motion.      Cervical back: Normal range of motion and neck supple.     Neurological: He is alert and oriented to person, place, and time. He has normal strength. No cranial nerve deficit. GCS score is 15. GCS eye subscore is 4. GCS verbal subscore is 5. GCS motor subscore is 6.   Skin: Skin is warm. No rash noted.   Right parietal area 1 cm laceration with a rock embedded, no active bleeding   Psychiatric: His behavior is normal.         ED Course   Lac Repair    Date/Time: 9/30/2023 6:29 PM    Performed by: Max Stacy MD  Authorized by: Max Stacy MD    Consent:     Consent obtained:  Verbal    Consent given by:  Patient    Risks, benefits, and alternatives were discussed: yes      Risks discussed:  Infection, pain, retained foreign body, poor cosmetic result, need for additional repair and poor wound healing    Alternatives discussed:  No treatment, delayed treatment and referral  Universal protocol:     Procedure explained and questions answered to patient or proxy's satisfaction: yes      Relevant documents present and verified: yes      Test results available: yes      Imaging studies available: yes      Required blood products, implants, devices, and special equipment available: yes       Site/side marked: no      Immediately prior to procedure, a time out was called: yes      Patient identity confirmed:  Verbally with patient  Anesthesia:     Anesthesia method:  Local infiltration    Local anesthetic:  Lidocaine 1% w/o epi  Laceration details:     Location:  Scalp    Scalp location:  R parietal    Length (cm):  1    Depth (mm):  5  Pre-procedure details:     Preparation:  Patient was prepped and draped in usual sterile fashion and imaging obtained to evaluate for foreign bodies  Exploration:     Limited defect created (wound extended): no      Hemostasis achieved with:  Direct pressure    Imaging obtained: x-ray      Imaging outcome: foreign body noted      Wound exploration: wound explored through full range of motion and entire depth of wound visualized      Wound extent: foreign bodies/material      Foreign bodies/material:  Stone    Contaminated: yes    Treatment:     Area cleansed with:  Saline    Amount of cleaning:  Standard    Irrigation solution:  Sterile saline    Irrigation volume:  150    Irrigation method:  Pressure wash and syringe    Visualized foreign bodies/material removed: yes      Debridement:  None    Undermining:  None    Scar revision: no    Skin repair:     Repair method:  Sutures    Suture size:  3-0    Suture material:  Prolene    Suture technique:  Horizontal mattress    Number of sutures:  1  Approximation:     Approximation:  Close  Repair type:     Repair type:  Intermediate  Post-procedure details:     Dressing:  Open (no dressing)    Procedure completion:  Tolerated well, no immediate complications  Foreign Body    Date/Time: 9/30/2023 6:31 PM    Performed by: Max Stacy MD  Authorized by: Max Stacy MD  Consent Done: Yes  Consent: Verbal consent obtained.  Risks and benefits: risks, benefits and alternatives were discussed  Consent given by: patient  Patient understanding: patient states understanding of the procedure being  "performed  Patient consent: the patient's understanding of the procedure matches consent given  Procedure consent: procedure consent matches procedure scheduled  Relevant documents: relevant documents present and verified  Test results: test results available and properly labeled  Site marked: the operative site was not marked  Imaging studies: imaging studies available  Required items: required blood products, implants, devices, and special equipment available  Patient identity confirmed: verbally with patient  Time out: Immediately prior to procedure a "time out" was called to verify the correct patient, procedure, equipment, support staff and site/side marked as required.  Body area: skin  General location: head/neck  Location details: scalp    Anesthesia:  Local Anesthetic: lidocaine 1% without epinephrine  Anesthetic total: 4 mL    Patient sedated: no  Patient restrained: no  Patient cooperative: yes  Localization method: visualized  Removal mechanism: hemostat  Dressing: dressing applied  Tendon involvement: none  Depth: subcutaneous  Complexity: simple  1 objects recovered.  Objects recovered: stone  Post-procedure assessment: foreign body removed  Patient tolerance: Patient tolerated the procedure well with no immediate complications      Labs Reviewed - No data to display       Imaging Results              CT Head Without Contrast (Final result)  Result time 09/30/23 18:03:36      Final result by Azam Hernadez MD (09/30/23 18:03:36)                   Impression:      No acute intracranial abnormality.      Electronically signed by: Azam Hernadez MD  Date:    09/30/2023  Time:    18:03               Narrative:    EXAMINATION:  CT HEAD WITHOUT CONTRAST    CLINICAL HISTORY:  Head trauma, skull fracture or hematoma (Age 19-64y);    TECHNIQUE:  Axial images of the head were obtained without IV contrast administration.  Coronal and sagittal reconstructions were provided.  Three dimensional and MIP images were " obtained and evaluated.  Total DLP was 963 mGy-cm. Dose lowering technique and automated exposure control were utilized for this exam.    COMPARISON:  None    FINDINGS:  There is normal brain formation.  There is normal gray-white matter differentiation.  There is mild periventricular and subcortical white matter hypodensity.  There is no hemorrhage, hydrocephalus, or midline shift.  There is no cytotoxic or vasogenic edema.  There is no intra or extra-axial fluid collection.  There is no herniation.    The calvarium is intact.  There is no fracture.  The bilateral orbits are normal.  The paranasal sinuses and mastoid air cells are normally developed with minimal mucoperiosteal thickening of the left maxillary sinus.                                       Medications   LIDOcaine HCL 10 mg/ml (1%) injection 10 mL (has no administration in time range)   Tdap (BOOSTRIX) vaccine injection 0.5 mL (has no administration in time range)     Medical Decision Making  Amount and/or Complexity of Data Reviewed  Radiology: ordered.    Risk  Prescription drug management.                               Clinical Impression:   Final diagnoses:  [S09.90XA] Injury of head, initial encounter (Primary)  [N18.6, Z99.2] CKD (chronic kidney disease) requiring chronic dialysis  [S01.01XA] Scalp laceration, initial encounter  [S00.05XA] Foreign body of skin of scalp, initial encounter        ED Disposition Condition    Discharge Stable          ED Prescriptions    None       Follow-up Information       Follow up With Specialties Details Why Contact Info    [facility] Emergency Medicine  If symptoms worsen [facility] [address] [phone]    [facility]  Schedule an appointment as soon as possible for a visit in 1 month  [facility] [address]    [facility] Emergency Medicine In 1 week For suture removal [address] [place] [address]  [facility] [address]  [phone]             Max Stacy MD  09/30/23 2392

## 2023-10-01 ENCOUNTER — HOSPITAL ENCOUNTER (INPATIENT)
Facility: HOSPITAL | Age: 63
LOS: 5 days | Discharge: HOME | DRG: 070 | End: 2023-10-06
Attending: STUDENT IN AN ORGANIZED HEALTH CARE EDUCATION/TRAINING PROGRAM | Admitting: INTERNAL MEDICINE

## 2023-10-01 DIAGNOSIS — R07.9 CHEST PAIN: ICD-10-CM

## 2023-10-01 DIAGNOSIS — Z99.2 ESRD ON HEMODIALYSIS: ICD-10-CM

## 2023-10-01 DIAGNOSIS — N18.6 ESRD ON HEMODIALYSIS: ICD-10-CM

## 2023-10-01 DIAGNOSIS — R53.1 WEAKNESS: ICD-10-CM

## 2023-10-01 DIAGNOSIS — R41.82 ALTERED MENTAL STATUS, UNSPECIFIED ALTERED MENTAL STATUS TYPE: ICD-10-CM

## 2023-10-01 DIAGNOSIS — G93.40 ENCEPHALOPATHY ACUTE: Primary | ICD-10-CM

## 2023-10-01 LAB
ALBUMIN SERPL-MCNC: 3.3 G/DL (ref 3.4–4.8)
ALBUMIN/GLOB SERPL: 0.9 RATIO (ref 1.1–2)
ALLENS TEST BLOOD GAS (OHS): YES
ALP SERPL-CCNC: 62 UNIT/L (ref 40–150)
ALT SERPL-CCNC: 17 UNIT/L (ref 0–55)
AMPHET UR QL SCN: NEGATIVE
APPEARANCE UR: CLEAR
AST SERPL-CCNC: 13 UNIT/L (ref 5–34)
BACTERIA #/AREA URNS AUTO: ABNORMAL /HPF
BARBITURATE SCN PRESENT UR: NEGATIVE
BASE EXCESS BLD CALC-SCNC: 1.9 MMOL/L (ref -2–2)
BASOPHILS # BLD AUTO: 0.07 X10(3)/MCL
BASOPHILS NFR BLD AUTO: 0.7 %
BENZODIAZ UR QL SCN: NEGATIVE
BILIRUB SERPL-MCNC: 0.5 MG/DL
BILIRUB UR QL STRIP.AUTO: NEGATIVE
BLOOD GAS SAMPLE TYPE (OHS): ABNORMAL
BUN SERPL-MCNC: 42.9 MG/DL (ref 8.4–25.7)
CA-I BLD-SCNC: 1.16 MMOL/L (ref 1.12–1.23)
CALCIUM SERPL-MCNC: 9.4 MG/DL (ref 8.8–10)
CANNABINOIDS UR QL SCN: NEGATIVE
CHLORIDE SERPL-SCNC: 98 MMOL/L (ref 98–107)
CO2 BLDA-SCNC: 27.9 MMOL/L
CO2 SERPL-SCNC: 21 MMOL/L (ref 23–31)
COCAINE UR QL SCN: NEGATIVE
COHGB MFR BLDA: 0.8 % (ref 0.5–1.5)
COLOR UR AUTO: YELLOW
CREAT SERPL-MCNC: 8.81 MG/DL (ref 0.73–1.18)
DRAWN BY BLOOD GAS (OHS): ABNORMAL
EOSINOPHIL # BLD AUTO: 0.13 X10(3)/MCL (ref 0–0.9)
EOSINOPHIL NFR BLD AUTO: 1.2 %
ERYTHROCYTE [DISTWIDTH] IN BLOOD BY AUTOMATED COUNT: 14.9 % (ref 11.5–17)
ETHANOL SERPL-MCNC: <10 MG/DL
FENTANYL UR QL SCN: NEGATIVE
FIO2 BLOOD GAS (OHS): 21 %
FLUAV AG UPPER RESP QL IA.RAPID: NOT DETECTED
FLUBV AG UPPER RESP QL IA.RAPID: NOT DETECTED
GFR SERPLBLD CREATININE-BSD FMLA CKD-EPI: 6 MLS/MIN/1.73/M2
GLOBULIN SER-MCNC: 3.5 GM/DL (ref 2.4–3.5)
GLUCOSE SERPL-MCNC: 111 MG/DL (ref 82–115)
GLUCOSE UR QL STRIP.AUTO: NEGATIVE
HCO3 BLDA-SCNC: 26.6 MMOL/L (ref 22–26)
HCT VFR BLD AUTO: 35.3 % (ref 42–52)
HGB BLD-MCNC: 11.6 G/DL (ref 14–18)
IMM GRANULOCYTES # BLD AUTO: 0.03 X10(3)/MCL (ref 0–0.04)
IMM GRANULOCYTES NFR BLD AUTO: 0.3 %
INR PPP: 1
KETONES UR QL STRIP.AUTO: NEGATIVE
LEUKOCYTE ESTERASE UR QL STRIP.AUTO: NEGATIVE
LYMPHOCYTES # BLD AUTO: 0.88 X10(3)/MCL (ref 0.6–4.6)
LYMPHOCYTES NFR BLD AUTO: 8.3 %
MAGNESIUM SERPL-MCNC: 2.5 MG/DL (ref 1.6–2.6)
MCH RBC QN AUTO: 25 PG (ref 27–31)
MCHC RBC AUTO-ENTMCNC: 32.9 G/DL (ref 33–36)
MCV RBC AUTO: 76.1 FL (ref 80–94)
MDMA UR QL SCN: NEGATIVE
METHGB MFR BLDA: 0.7 % (ref 0.4–1.5)
MONOCYTES # BLD AUTO: 0.55 X10(3)/MCL (ref 0.1–1.3)
MONOCYTES NFR BLD AUTO: 5.2 %
NEUTROPHILS # BLD AUTO: 9 X10(3)/MCL (ref 2.1–9.2)
NEUTROPHILS NFR BLD AUTO: 84.3 %
NITRITE UR QL STRIP.AUTO: NEGATIVE
NRBC BLD AUTO-RTO: 0 %
O2 HB BLOOD GAS (OHS): 96.6 % (ref 94–97)
OPIATES UR QL SCN: NEGATIVE
OXYHGB MFR BLDA: 11.5 G/DL (ref 12–16)
PCO2 BLDA: 41 MMHG (ref 35–45)
PCP UR QL: NEGATIVE
PH BLDA: 7.42 [PH] (ref 7.35–7.45)
PH UR STRIP.AUTO: 8 [PH]
PH UR: 8 [PH] (ref 3–11)
PLATELET # BLD AUTO: 245 X10(3)/MCL (ref 130–400)
PMV BLD AUTO: 8.7 FL (ref 7.4–10.4)
PO2 BLDA: 103 MMHG (ref 80–100)
POTASSIUM BLOOD GAS (OHS): 4 MMOL/L (ref 3.5–5)
POTASSIUM SERPL-SCNC: 4.2 MMOL/L (ref 3.5–5.1)
PROT SERPL-MCNC: 6.8 GM/DL (ref 5.8–7.6)
PROT UR QL STRIP.AUTO: ABNORMAL
PROTHROMBIN TIME: 13.4 SECONDS (ref 12.5–14.5)
RBC # BLD AUTO: 4.64 X10(6)/MCL (ref 4.7–6.1)
RBC #/AREA URNS AUTO: 11 /HPF
RBC UR QL AUTO: ABNORMAL
SAMPLE SITE BLOOD GAS (OHS): ABNORMAL
SAO2 % BLDA: 98 %
SARS-COV-2 RNA RESP QL NAA+PROBE: NOT DETECTED
SODIUM BLOOD GAS (OHS): 129 MMOL/L (ref 137–145)
SODIUM SERPL-SCNC: 135 MMOL/L (ref 136–145)
SP GR UR STRIP.AUTO: 1.01 (ref 1–1.03)
SQUAMOUS #/AREA URNS AUTO: <5 /HPF
TROPONIN I SERPL-MCNC: 0.03 NG/ML (ref 0–0.04)
TSH SERPL-ACNC: 0.38 UIU/ML (ref 0.35–4.94)
UROBILINOGEN UR STRIP-ACNC: 0.2
WBC # SPEC AUTO: 10.66 X10(3)/MCL (ref 4.5–11.5)
WBC #/AREA URNS AUTO: <5 /HPF

## 2023-10-01 PROCEDURE — 84484 ASSAY OF TROPONIN QUANT: CPT | Performed by: STUDENT IN AN ORGANIZED HEALTH CARE EDUCATION/TRAINING PROGRAM

## 2023-10-01 PROCEDURE — 82077 ASSAY SPEC XCP UR&BREATH IA: CPT | Performed by: STUDENT IN AN ORGANIZED HEALTH CARE EDUCATION/TRAINING PROGRAM

## 2023-10-01 PROCEDURE — 96374 THER/PROPH/DIAG INJ IV PUSH: CPT

## 2023-10-01 PROCEDURE — 63600175 PHARM REV CODE 636 W HCPCS: Performed by: INTERNAL MEDICINE

## 2023-10-01 PROCEDURE — 0240U COVID/FLU A&B PCR: CPT | Performed by: STUDENT IN AN ORGANIZED HEALTH CARE EDUCATION/TRAINING PROGRAM

## 2023-10-01 PROCEDURE — 82803 BLOOD GASES ANY COMBINATION: CPT

## 2023-10-01 PROCEDURE — 93010 EKG 12-LEAD: ICD-10-PCS | Mod: ,,, | Performed by: INTERNAL MEDICINE

## 2023-10-01 PROCEDURE — 25000003 PHARM REV CODE 250: Performed by: NURSE PRACTITIONER

## 2023-10-01 PROCEDURE — 84443 ASSAY THYROID STIM HORMONE: CPT | Performed by: STUDENT IN AN ORGANIZED HEALTH CARE EDUCATION/TRAINING PROGRAM

## 2023-10-01 PROCEDURE — 85025 COMPLETE CBC W/AUTO DIFF WBC: CPT | Performed by: STUDENT IN AN ORGANIZED HEALTH CARE EDUCATION/TRAINING PROGRAM

## 2023-10-01 PROCEDURE — 93010 ELECTROCARDIOGRAM REPORT: CPT | Mod: ,,, | Performed by: INTERNAL MEDICINE

## 2023-10-01 PROCEDURE — 80053 COMPREHEN METABOLIC PANEL: CPT | Performed by: STUDENT IN AN ORGANIZED HEALTH CARE EDUCATION/TRAINING PROGRAM

## 2023-10-01 PROCEDURE — 99900035 HC TECH TIME PER 15 MIN (STAT)

## 2023-10-01 PROCEDURE — 83735 ASSAY OF MAGNESIUM: CPT | Performed by: STUDENT IN AN ORGANIZED HEALTH CARE EDUCATION/TRAINING PROGRAM

## 2023-10-01 PROCEDURE — 93005 ELECTROCARDIOGRAM TRACING: CPT

## 2023-10-01 PROCEDURE — 81001 URINALYSIS AUTO W/SCOPE: CPT | Performed by: STUDENT IN AN ORGANIZED HEALTH CARE EDUCATION/TRAINING PROGRAM

## 2023-10-01 PROCEDURE — 99285 EMERGENCY DEPT VISIT HI MDM: CPT | Mod: 25

## 2023-10-01 PROCEDURE — 85610 PROTHROMBIN TIME: CPT | Performed by: STUDENT IN AN ORGANIZED HEALTH CARE EDUCATION/TRAINING PROGRAM

## 2023-10-01 PROCEDURE — 80307 DRUG TEST PRSMV CHEM ANLYZR: CPT | Performed by: STUDENT IN AN ORGANIZED HEALTH CARE EDUCATION/TRAINING PROGRAM

## 2023-10-01 PROCEDURE — 36600 WITHDRAWAL OF ARTERIAL BLOOD: CPT

## 2023-10-01 PROCEDURE — 63600175 PHARM REV CODE 636 W HCPCS: Performed by: NURSE PRACTITIONER

## 2023-10-01 PROCEDURE — 11000001 HC ACUTE MED/SURG PRIVATE ROOM

## 2023-10-01 RX ORDER — BACLOFEN 10 MG/1
10 TABLET ORAL 2 TIMES DAILY
Status: DISCONTINUED | OUTPATIENT
Start: 2023-10-01 | End: 2023-10-03

## 2023-10-01 RX ORDER — SODIUM CHLORIDE 0.9 % (FLUSH) 0.9 %
10 SYRINGE (ML) INJECTION
Status: DISCONTINUED | OUTPATIENT
Start: 2023-10-01 | End: 2023-10-06 | Stop reason: HOSPADM

## 2023-10-01 RX ORDER — SODIUM CHLORIDE 9 MG/ML
INJECTION, SOLUTION INTRAVENOUS
Status: CANCELLED | OUTPATIENT
Start: 2023-10-01

## 2023-10-01 RX ORDER — AMLODIPINE BESYLATE 5 MG/1
10 TABLET ORAL NIGHTLY
Status: DISCONTINUED | OUTPATIENT
Start: 2023-10-01 | End: 2023-10-06 | Stop reason: HOSPADM

## 2023-10-01 RX ORDER — ISOSORBIDE DINITRATE 20 MG/1
20 TABLET ORAL 3 TIMES DAILY
Status: DISCONTINUED | OUTPATIENT
Start: 2023-10-01 | End: 2023-10-06 | Stop reason: HOSPADM

## 2023-10-01 RX ORDER — HEPARIN SODIUM 5000 [USP'U]/ML
5000 INJECTION, SOLUTION INTRAVENOUS; SUBCUTANEOUS EVERY 8 HOURS
Status: DISCONTINUED | OUTPATIENT
Start: 2023-10-01 | End: 2023-10-04

## 2023-10-01 RX ORDER — PROCHLORPERAZINE EDISYLATE 5 MG/ML
5 INJECTION INTRAMUSCULAR; INTRAVENOUS EVERY 6 HOURS PRN
Status: DISCONTINUED | OUTPATIENT
Start: 2023-10-01 | End: 2023-10-06 | Stop reason: HOSPADM

## 2023-10-01 RX ORDER — CARVEDILOL 12.5 MG/1
25 TABLET ORAL 2 TIMES DAILY
Status: DISCONTINUED | OUTPATIENT
Start: 2023-10-01 | End: 2023-10-06 | Stop reason: HOSPADM

## 2023-10-01 RX ORDER — ACETAMINOPHEN 325 MG/1
650 TABLET ORAL EVERY 4 HOURS PRN
Status: DISCONTINUED | OUTPATIENT
Start: 2023-10-01 | End: 2023-10-06 | Stop reason: HOSPADM

## 2023-10-01 RX ORDER — NALOXONE HCL 0.4 MG/ML
0.02 VIAL (ML) INJECTION
Status: DISCONTINUED | OUTPATIENT
Start: 2023-10-01 | End: 2023-10-06 | Stop reason: HOSPADM

## 2023-10-01 RX ORDER — SEVELAMER CARBONATE 800 MG/1
800 TABLET, FILM COATED ORAL
Status: DISCONTINUED | OUTPATIENT
Start: 2023-10-01 | End: 2023-10-06 | Stop reason: HOSPADM

## 2023-10-01 RX ORDER — ACETAMINOPHEN 500 MG
1000 TABLET ORAL EVERY 6 HOURS PRN
Status: DISCONTINUED | OUTPATIENT
Start: 2023-10-01 | End: 2023-10-06 | Stop reason: HOSPADM

## 2023-10-01 RX ORDER — HYDRALAZINE HYDROCHLORIDE 20 MG/ML
20 INJECTION INTRAMUSCULAR; INTRAVENOUS
Status: DISCONTINUED | OUTPATIENT
Start: 2023-10-01 | End: 2023-10-06 | Stop reason: HOSPADM

## 2023-10-01 RX ORDER — ATORVASTATIN CALCIUM 40 MG/1
40 TABLET, FILM COATED ORAL DAILY
Status: DISCONTINUED | OUTPATIENT
Start: 2023-10-01 | End: 2023-10-06 | Stop reason: HOSPADM

## 2023-10-01 RX ORDER — SODIUM CHLORIDE 9 MG/ML
INJECTION, SOLUTION INTRAVENOUS ONCE
Status: CANCELLED | OUTPATIENT
Start: 2023-10-01 | End: 2023-10-01

## 2023-10-01 RX ORDER — ONDANSETRON 2 MG/ML
4 INJECTION INTRAMUSCULAR; INTRAVENOUS EVERY 4 HOURS PRN
Status: DISCONTINUED | OUTPATIENT
Start: 2023-10-01 | End: 2023-10-06 | Stop reason: HOSPADM

## 2023-10-01 RX ORDER — CALCITRIOL 0.25 UG/1
0.25 CAPSULE ORAL EVERY OTHER DAY
Status: DISCONTINUED | OUTPATIENT
Start: 2023-10-02 | End: 2023-10-06 | Stop reason: HOSPADM

## 2023-10-01 RX ADMIN — AMLODIPINE BESYLATE 10 MG: 5 TABLET ORAL at 09:10

## 2023-10-01 RX ADMIN — HYDRALAZINE HYDROCHLORIDE 75 MG: 50 TABLET, FILM COATED ORAL at 03:10

## 2023-10-01 RX ADMIN — SEVELAMER CARBONATE 800 MG: 800 TABLET, FILM COATED ORAL at 11:10

## 2023-10-01 RX ADMIN — PROCHLORPERAZINE EDISYLATE 5 MG: 5 INJECTION INTRAMUSCULAR; INTRAVENOUS at 11:10

## 2023-10-01 RX ADMIN — HYDRALAZINE HYDROCHLORIDE 20 MG: 20 INJECTION INTRAMUSCULAR; INTRAVENOUS at 05:10

## 2023-10-01 RX ADMIN — ISOSORBIDE DINITRATE 20 MG: 20 TABLET ORAL at 09:10

## 2023-10-01 RX ADMIN — CARVEDILOL 25 MG: 12.5 TABLET, FILM COATED ORAL at 11:10

## 2023-10-01 RX ADMIN — HEPARIN SODIUM 5000 UNITS: 5000 INJECTION, SOLUTION INTRAVENOUS; SUBCUTANEOUS at 02:10

## 2023-10-01 RX ADMIN — HYDRALAZINE HYDROCHLORIDE 20 MG: 20 INJECTION INTRAMUSCULAR; INTRAVENOUS at 10:10

## 2023-10-01 RX ADMIN — HYDRALAZINE HYDROCHLORIDE 75 MG: 50 TABLET, FILM COATED ORAL at 09:10

## 2023-10-01 RX ADMIN — ONDANSETRON 4 MG: 2 INJECTION INTRAMUSCULAR; INTRAVENOUS at 09:10

## 2023-10-01 RX ADMIN — SEVELAMER CARBONATE 800 MG: 800 TABLET, FILM COATED ORAL at 05:10

## 2023-10-01 RX ADMIN — ISOSORBIDE DINITRATE 20 MG: 20 TABLET ORAL at 03:10

## 2023-10-01 RX ADMIN — HEPARIN SODIUM 5000 UNITS: 5000 INJECTION, SOLUTION INTRAVENOUS; SUBCUTANEOUS at 10:10

## 2023-10-01 RX ADMIN — BACLOFEN 10 MG: 10 TABLET ORAL at 11:10

## 2023-10-01 RX ADMIN — ATORVASTATIN CALCIUM 40 MG: 40 TABLET, FILM COATED ORAL at 11:10

## 2023-10-01 RX ADMIN — CARVEDILOL 25 MG: 12.5 TABLET, FILM COATED ORAL at 09:10

## 2023-10-01 RX ADMIN — ACETAMINOPHEN 1000 MG: 500 TABLET ORAL at 12:10

## 2023-10-01 RX ADMIN — BACLOFEN 10 MG: 10 TABLET ORAL at 09:10

## 2023-10-01 NOTE — ED NOTES
" was used with this pt. Pt was oriented to self and place. Pt disoriented to time and situation. When ask what month are we in pt could not recall the month. When  ask what year are we in pt stated "1992", then stated "oops that's not right." Pt then stated "I have a headache" and did not state the year.   "

## 2023-10-01 NOTE — CONSULTS
Consult note  Nephrology    Admit Date: 10/1/2023   LOS: 0 days     SUBJECTIVE:     CC:  ESRD    HPI:  62 Y/O [place] male with history of ESRD dialyzed in [facility] on sat AM and after discharge appearanty passed out and hit his head on cement  , brought to this hospital for Eval   Currently has a tunneled cath in Right IJ  Denies any problem although still slightly confused       PMH:  1   HTN  2   hyperlipidemia  3   SHPT  4   metabolic bone disease   5   CAD    Allergy:  NKA    Social history:  Neg for smoking or drinking     Family History:       neg    Review of Systems:  Constitutional: No fever or chills  Respiratory: No cough or shortness of breath  Cardiovascular: No chest pain or palpitations  Gastrointestinal: No nausea or vomiting  Neurological: was weak on yesterday when he has syncopal episode  also slightly confused     OBJECTIVE:     Vital Signs Range (Last 24H):  Vitals:    10/01/23 1300   BP: (!) 159/93   Pulse: 68   Resp: 19   Temp:        Temp:  [98.1 °F (36.7 °C)]   Pulse:  []   Resp:  [10-20]   BP: (120-203)/()   SpO2:  [95 %-100 %]     I & O (Last 24H):No intake or output data in the 24 hours ending 10/01/23 1432    Physical Exam:  Alert , oriented , in NAD   Head   has laceration but stitched   Neck   supple   Chest   symmetric   Lungs   clear   Heart   RRR  Abdomen   soft , non tender   Ext   1+ edema    Laboratory Data:    Recent Labs   Lab 09/30/23  1336 10/01/23  0300    135*   K 4.5 4.2   CO2 24 21*   BUN 37.0* 42.9*   CREATININE 8.29* 8.81*   GLUCOSE 114 111   CALCIUM 9.6 9.4   PHOS 4.8*  --      Lab Results   Component Value Date    .5 (H) 09/22/2023    CALCIUM 9.4 10/01/2023    CAION 1.16 10/01/2023    PHOS 4.8 (H) 09/30/2023     Lab Results   Component Value Date    IRON 63 (L) 09/11/2023    TIBC 197 (L) 09/11/2023    FERRITIN 487.55 (H) 09/11/2023       Medications:  Medication list was reviewed and changes noted under Assessment/Plan.    Diagnostic  Results:    Reviewed most recent CT/US/Echo/MRI    ASSESSMENT/PLAN:   1   ESRD  2   syncopal episode   3   HTN  on multiple meds   4   SHPT , metabolic bone disease   5   CAD      Plan   Hemodialysis in AM

## 2023-10-01 NOTE — H&P
[facility]  Hospital Medicine History & Physical Examination       Patient Name: Ryann Ly  MRN: [id number]  Patient Class: IP- Inpatient   Admission Date: 10/1/2023   Admitting Physician: TREY Service   Length of Stay: 0  Attending Physician: Dr. Sean Valdez  Primary Care Provider: [facility]  Face-to-Face encounter date: 10/01/2023  Code Status:Full code  Chief Complaint: Medication Reaction (Pt is on HD, had run yesterday @ [facility], then had a fall and was seen again in the ED, CT head (-), pt states he had an adverse reaction to medicine they gave him that made him throw up over there when he was discharged x8 hrs ago. Pt GCS-14)        Patient information was obtained from patient, patient's family, past medical records and ER records.     HISTORY OF PRESENT ILLNESS:   Ryann Ly is a 63 y.o. male who PMH includes ESRD on HD; presents to the ED  at [facility] on 10/1/2023  with reports of mental status changes and weakness. PT reported went to [facility] and has HD on 9/30/2023. IT was reported he had N/V after treatment fell, hit head on concrete outside of the hospital after discharge. PT was brought back to the ED at [facility]  which he had right parietal laceration with repair at [facility], he was d/c home. PT presents here with mental status changes and confusion. Pt also reported to ED provider throat pain. NO reported of CP, SOB, syncope or urinary symptoms. CT head revealed no acute intracranial abnormality identified, findings of chronic microvascular ischemic disease. CXR revealed no acute cardiopulmonary process.   Initial VS /106 P 79 R 19 T 98.1F O2 saturation 95% n room air. PT is admitted to hospital medicine services for further management. Renal services consulted.    services used, patient is able to speak some English and understand questions asked.     PAST MEDICAL HISTORY:   ESRD with HD    PAST SURGICAL HISTORY:     Past Surgical History:   Procedure Laterality Date     INSERTION OF TUNNELED CENTRAL VENOUS HEMODIALYSIS CATHETER N/A 9/19/2023    Procedure: Insertion, Catheter, Central Venous, Hemodialysis;  Surgeon: Theodore Benitez MD;  Location: [facility] CATH LAB;  Service: Peripheral Vascular;  Laterality: N/A;       ALLERGIES:   Patient has no known allergies.    FAMILY HISTORY:   Reviewed and negative    SOCIAL HISTORY:   No reports of tobacco, alcohol, or illicit drug use  Lives with friends    HOME MEDICATIONS:   As documented  Prior to Admission medications    Medication Sig Start Date End Date Taking? Authorizing Provider   amLODIPine (NORVASC) 10 MG tablet Take 1 tablet (10 mg total) by mouth every evening. 9/20/23 9/19/24  Abner Weaver MD   aspirin 81 MG Chew Take 1 tablet (81 mg total) by mouth once daily. 9/21/23 9/20/24  Abner Weaver MD   atorvastatin (LIPITOR) 40 MG tablet Take 1 tablet (40 mg total) by mouth once daily. 9/20/23 9/19/24  Abner Weaver MD   baclofen (LIORESAL) 10 MG tablet Take 1 tablet (10 mg total) by mouth 2 (two) times daily. for 20 doses 9/27/23 10/7/23  Jude Schreiber PA   calcitRIOL (ROCALTROL) 0.25 MCG Cap Take 1 capsule (0.25 mcg total) by mouth every other day. 9/22/23   Abner Weaver MD   carvediloL (COREG) 25 MG tablet Take 1 tablet (25 mg total) by mouth 2 (two) times daily. 9/20/23 9/19/24  Abner Weaver MD   hydrALAZINE (APRESOLINE) 25 MG tablet Take 3 tablets (75 mg total) by mouth 3 (three) times daily. 9/20/23 9/19/24  Abner Weaver MD   HYDROcodone-acetaminophen (NORCO) 5-325 mg per tablet Take 1 tablet by mouth every 8 (eight) hours as needed for Pain. 9/27/23   Jude Schreiber PA   isosorbide dinitrate (ISORDIL) 20 MG tablet Take 1 tablet (20 mg total) by mouth 3 (three) times daily. 9/20/23 9/19/24  Abner Weaver MD   LIDOcaine (LIDODERM) 5 % Place 1 patch onto the skin once daily. Remove & Discard patch within 12 hours or as directed by MD for 12 doses 9/27/23 10/9/23  Jude Schreiber PA   sevelamer carbonate (RENVELA) 800 mg Tab  Take 1 tablet (800 mg total) by mouth 3 (three) times daily with meals. 9/20/23 9/19/24  Abner Weaver MD       REVIEW OF SYSTEMS:   Except as documented, all other systems reviewed and negative     PHYSICAL EXAM:     VITAL SIGNS: 24 HRS MIN & MAX LAST   Temp  Min: 97.2 °F (36.2 °C)  Max: 98.1 °F (36.7 °C) 98.1 °F (36.7 °C)   BP  Min: 149/83  Max: 204/97 (!) 188/87   Pulse  Min: 64  Max: 90  78   Resp  Min: 10  Max: 20 16   SpO2  Min: 95 %  Max: 100 % 98 %       General appearance: chronically ill appearing male, non-toxic, language barrier, no family at bedside  HENT: Right parietal contusion with repaired laceration Moist mucous membranes of oral cavity.  Eyes: PERRL  Neck: Supple.   Lungs: Clear to auscultation bilaterally. No wheezing present.   Heart: Regular rate and rhythm. S1 and S2 present cap refill brisk, dialysis catheter to right upper chest wall  Abdomen: Soft, non-distended, non-tender. No rebound tenderness/guarding. Bowel sounds are normal.   Extremities: No cyanosis, clubbing, GARCIA, generalized weakness  Skin:warm and dry, right parietal repaired laceration  Neuro: oriented to person and place, intermittent confusion, no acute focal deficits  Psych/mental status: Appropriate mood and affect. Responds appropriately to questions.     LABS AND IMAGING:     Recent Labs   Lab 09/25/23  0952 09/28/23  0755 10/01/23  0300   WBC 5.26 6.60 10.66   RBC 4.15* 3.84* 4.64*   HGB 10.3* 9.6* 11.6*   HCT 31.7* 29.8* 35.3*   MCV 76.4* 77.6* 76.1*   MCH 24.8* 25.0* 25.0*   MCHC 32.5* 32.2* 32.9*   RDW 14.3 14.6 14.9    240 245   MPV 9.2 9.0 8.7       Recent Labs   Lab 09/28/23  0755 09/30/23  1336 10/01/23  0300 10/01/23  0554   * 136 135*  --    K 4.7 4.5 4.2  --    CO2 22* 24 21*  --    BUN 35.5* 37.0* 42.9*  --    CREATININE 8.44* 8.29* 8.81*  --    CALCIUM 8.2* 9.6 9.4  --    PH  --   --   --  7.420   MG 2.20 2.50 2.50  --    ALBUMIN 2.7* 3.4 3.3*  --    ALKPHOS 49 59 62  --    ALT 19 20 17  --     AST 12 13 13  --    BILITOT 0.3 0.5 0.5  --        Microbiology Results (last 7 days)       ** No results found for the last 168 hours. **             X-Ray Chest 1 View  Narrative: EXAMINATION:  XR CHEST 1 VIEW    CLINICAL HISTORY:  .;    TECHNIQUE:  Single view of the chest    COMPARISON:  09/28/2023    FINDINGS:  No focal opacification, pleural effusion, or pneumothorax.    The cardiomediastinal silhouette is within normal limits.    Tunneled right-sided catheter is unchanged.    No acute osseous abnormality.  Impression: No acute cardiopulmonary process.  Interval resolution of right lower lobe opacification.    Electronically signed by: Vincenzo Winn  Date:    10/01/2023  Time:    08:16  CT Head Without Contrast  Narrative: EXAMINATION:  CT HEAD WITHOUT CONTRAST    CLINICAL HISTORY:  Mental status change, unknown cause;    TECHNIQUE:  Low dose axial images were obtained through the head.  Coronal and sagittal reformations were also performed. Contrast was not administered.    Automatic exposure control was utilized to reduce the patient's radiation dose.    DLP= [id number]    COMPARISON:  09/30/2023    FINDINGS:  No acute intracranial hemorrhage, edema or mass. No acute parenchymal abnormality.    Diffuse cerebral atrophy with concordant ventricular enlargement.    Scattered hypodensities throughout the deep periventricular white matter.    The osseous structures are normal.    The mastoid air cells are clear.    The auditory canals are patent bilaterally.    The globes and orbital contents are normal bilaterally.    The visualized maxillary, ethmoid and sphenoid sinuses are clear.  Impression: No acute intracranial abnormality identified.  Findings of chronic microvascular ischemic disease.    Electronically signed by: Vincenzo Winn  Date:    10/01/2023  Time:    08:05        ASSESSMENT & PLAN:   ASSESSMENT:  Acute encephalopathy- suspected due to closed head injury possible concussion- POA  HTN- urgency-  POA  Acute Concussion - suspect after closed head injury- POA  Fall - ground level with closed head injury- POA  ESRD with HD- POA  Weakness- POA    PLAN:  Consult renal services appreciate assistance and recommendations  Fall precautions  Accurate I&O  Daily weights  Neuro check q 4 hours x 24 hours  Home medication as deemed necessary  Labs in the am    VTE Prophylaxis: Heparin for DVT prophylaxis and will be advised to be as mobile as possible and sit in a chair as tolerated    Patient condition:  Stable  __________________________________________________________________________  INPATIENT LIST OF MEDICATIONS     Scheduled Meds:   heparin (porcine)  5,000 Units Subcutaneous Q8H     Continuous Infusions:  PRN Meds:.acetaminophen, acetaminophen, hydrALAZINE, naloxone, ondansetron, prochlorperazine, sodium chloride 0.9%      IPauline FNP have reviewed and discussed the case with Dr. Sean Valdez.  Please see the following addendum for further assessment and plan from there attending SNOW Gallego   10/01/2023

## 2023-10-01 NOTE — ED PROVIDER NOTES
"Encounter Date: 10/1/2023    SCRIBE #1 NOTE: I, Lowell Rodrigez, am scribing for, and in the presence of,  Collin Garcia MD. I have scribed the following portions of the note - Other sections scribed: HPI,ROS,PE.       History     Chief Complaint   Patient presents with    Medication Reaction     Pt is on HD, had run yesterday @ [facility], then had a fall and was seen again in the ED, CT head (-), pt states he had an adverse reaction to medicine they gave him that made him throw up over there when he was discharged x8 hrs ago. Pt GCS-14     62 y/o male with PMHx of ESRD on dialysis presents to ED with multiple complaints.  He has some mental status on my exam.  When 1st as  Pt reports going to dialysis on 9/30 at [facility] and states that he vomited and fell after receiving his treatment. He also c/o throat pain that started a few days ago. He denies any headache or vision changes. When asked the year pt reports it is 2026. When asked what city he lives in, he states "[place]". Pt denies any tobacco, etoh, or drug use.     The history is provided by the patient. The history is limited by a language barrier (Faroese ). A  was used.     Review of patient's allergies indicates:  No Known Allergies  Past Medical History:   Diagnosis Date    Renal disorder      Past Surgical History:   Procedure Laterality Date    INSERTION OF TUNNELED CENTRAL VENOUS HEMODIALYSIS CATHETER N/A 9/19/2023    Procedure: Insertion, Catheter, Central Venous, Hemodialysis;  Surgeon: Theodore Benitez MD;  Location: [facility] CATH LAB;  Service: Peripheral Vascular;  Laterality: N/A;     No family history on file.  Social History     Tobacco Use    Smoking status: Never    Smokeless tobacco: Never   Substance Use Topics    Alcohol use: Never    Drug use: Never     Review of Systems   HENT:  Positive for sore throat.    Eyes:  Negative for visual disturbance.   Gastrointestinal:  Positive for vomiting.   Neurological:  Negative " for headaches.       Physical Exam     Initial Vitals [10/01/23 0125]   BP Pulse Resp Temp SpO2   (!) 201/106 79 19 98.1 °F (36.7 °C) 95 %      MAP       --         Physical Exam    Constitutional: He appears well-developed and well-nourished. He is not diaphoretic. No distress.   HENT:   Head: Normocephalic.   Right Ear: External ear normal.   Left Ear: External ear normal.   Nose: Nose normal.   Laceration with sutures to right parietal scalp.    Eyes: EOM are normal. Pupils are equal, round, and reactive to light. Right eye exhibits no discharge. Left eye exhibits no discharge.   Neck:   No neck stiffness.    Normal range of motion.  Cardiovascular:  Normal rate, regular rhythm and normal heart sounds.     Exam reveals no gallop and no friction rub.       No murmur heard.  Pulmonary/Chest: Effort normal and breath sounds normal. No respiratory distress. He has no wheezes. He has no rhonchi. He has no rales. He exhibits no tenderness.   Temporary dialysis catheter to right upper chest that is clean and dry.    Abdominal: Abdomen is soft. Bowel sounds are normal. He exhibits no distension and no mass. There is no abdominal tenderness. There is no rebound and no guarding.   Musculoskeletal:         General: No edema. Normal range of motion.      Cervical back: Normal range of motion.     Neurological: He is alert. No cranial nerve deficit or sensory deficit.   Pt is confused.    Skin: Skin is warm and dry. Capillary refill takes less than 2 seconds.         ED Course   Procedures  Labs Reviewed   COMPREHENSIVE METABOLIC PANEL - Abnormal; Notable for the following components:       Result Value    Sodium Level 135 (*)     Carbon Dioxide 21 (*)     Blood Urea Nitrogen 42.9 (*)     Creatinine 8.81 (*)     Albumin Level 3.3 (*)     Albumin/Globulin Ratio 0.9 (*)     All other components within normal limits   CBC WITH DIFFERENTIAL - Abnormal; Notable for the following components:    RBC 4.64 (*)     Hgb 11.6 (*)     Hct  35.3 (*)     MCV 76.1 (*)     MCH 25.0 (*)     MCHC 32.9 (*)     All other components within normal limits   URINALYSIS, REFLEX TO URINE CULTURE - Abnormal; Notable for the following components:    Protein, UA 4+ (*)     Blood, UA Trace (*)     All other components within normal limits   BLOOD GAS - Abnormal; Notable for the following components:    pO2, Blood gas 103.0 (*)     Sodium, Blood Gas 129 (*)     HCO3, Blood gas 26.6 (*)     THb, Blood gas 11.5 (*)     All other components within normal limits   URINALYSIS, MICROSCOPIC - Abnormal; Notable for the following components:    RBC, UA 11 (*)     All other components within normal limits   COVID/FLU A&B PCR - Normal    Narrative:     The Xpert Xpress SARS-CoV-2/FLU/RSV plus is a rapid, multiplexed real-time PCR test intended for the simultaneous qualitative detection and differentiation of SARS-CoV-2, Influenza A, Influenza B, and respiratory syncytial virus (RSV) viral RNA in either nasopharyngeal swab or nasal swab specimens.         DRUG SCREEN, URINE (BEAKER) - Normal    Narrative:     Cut off concentrations:    Amphetamines - 1000 ng/ml  Barbiturates - 200 ng/ml  Benzodiazepine - 200 ng/ml  Cannabinoids (THC) - 50 ng/ml  Cocaine - 300 ng/ml  Fentanyl - 1.0 ng/ml  MDMA - 500 ng/ml  Opiates - 300 ng/ml   Phencyclidine (PCP) - 25 ng/ml    Specimen submitted for drug analysis and tested for pH and specific gravity in order to evaluate sample integrity. Suspect tampering if specific gravity is <1.003 and/or pH is not within the range of 4.5 - 8.0  False negatives may result form substances such as bleach added to urine.  False positives may result for the presence of a substance with similar chemical structure to the drug or its metabolite.    This test provides only a PRELIMINARY analytical test result. A more specific alternate chemical method must be used in order to obtain a confirmed analytical result. Gas chromatography/mass spectrometry (GC/MS) is the  preferred confirmatory method. Other chemical confirmation methods are available. Clinical consideration and professional judgement should be applied to any drug of abuse test result, particularly when preliminary positive results are used.    Positive results will be confirmed only at the physicians request. Unconfirmed screening results are to be used only for medical purposes (treatment).        ALCOHOL,MEDICAL (ETHANOL) - Normal   MAGNESIUM - Normal   PROTIME-INR - Normal   TROPONIN I - Normal   TSH - Normal   CBC W/ AUTO DIFFERENTIAL    Narrative:     The following orders were created for panel order CBC auto differential.  Procedure                               Abnormality         Status                     ---------                               -----------         ------                     CBC with Differential[[id number]]       Abnormal            Final result                 Please view results for these tests on the individual orders.          Imaging Results              X-Ray Chest 1 View (In process)                      MRI Brain Without Contrast (In process)                      CT Head Without Contrast (Preliminary result)  Result time 10/01/23 02:40:46      Preliminary result by Cb Salomon MD (10/01/23 02:40:46)                   Narrative:    START OF REPORT:  Technique: CT of the head was performed without intravenous contrast with axial as well as coronal and sagittal images.    Comparison: None.    Dosage Information: Automated exposure control was utilized.    Clinical history: Ams.    Findings:  Hemorrhage: No acute intracranial hemorrhage is seen.  CSF spaces: The ventricles sulci and basal cisterns are within normal limits.  Brain parenchyma: Unremarkable with preservation of the grey white junction throughout. Mild scattered microvascular change is seen in portions of the periventricular and deep white matter tracts.  Cerebellum: Unremarkable.  Sella and skull base: The sella  appears to be within normal limits for age.  Intracranial calcifications: Incidental note is made of bilateral choroid plexus calcification.  Calvarium: No acute linear or depressed skull fracture is seen.    Maxillofacial Structures:  Paranasal sinuses: The visualized paranasal sinuses appear clear with no mucoperiosteal thickening or air fluid levels identified.  Orbits: The orbits appear unremarkable.  Zygomatic arches: The zygomatic arches are intact and unremarkable.  Temporal bones and mastoids: The temporal bones and mastoids appear unremarkable.  TMJ: The mandibular condyles appear normally placed with respect to the mandibular fossa.      Impression:  1. Unremarkable noncontrast CT of the head. Details and other findings as noted above.                                         Medications   hydrALAZINE injection 20 mg (20 mg Intravenous Given 10/1/23 0558)   sodium chloride 0.9% flush 10 mL (has no administration in time range)   ondansetron injection 4 mg (has no administration in time range)   prochlorperazine injection Soln 5 mg (has no administration in time range)   acetaminophen tablet 650 mg (has no administration in time range)   acetaminophen tablet 1,000 mg (has no administration in time range)   naloxone 0.4 mg/mL injection 0.02 mg (has no administration in time range)   heparin (porcine) injection 5,000 Units (has no administration in time range)             Scribe Attestation:   Scribe #1: I performed the above scribed service and the documentation accurately describes the services I performed. I attest to the accuracy of the note.    Attending Attestation:           Physician Attestation for Scribe:  Physician Attestation Statement for Scribe #1: I, Collin Garcia MD, reviewed documentation, as scribed by Lowell Rodrigez in my presence, and it is both accurate and complete.         Medical Decision Making  Patient presents emergency department altered mental status.    The differential  diagnosis includes, but is not limited to, metabolic encephalopathy, uremia, head bleed, CVA, TIA, drug abuse, alcohol intoxication     Patient was very confused here in the emergency department.  Initially he states that he was here for nausea and vomiting.  Then he states he is here for dialysis than he reports he is here with a sore throat.  He does not know the year.  When asked what city he was in he replies with [place].  He is following commands otherwise.  He does fall asleep several times during the interview.  We used a  for all his interactions.  I am unsure why he was so altered here in the emergency department.  Do not always baseline.  States he lives alone we have no family to get in touch with.  He is unsure where he lives.  His labs here in the emergency department largely unrevealing.  Elevated BUN and creatinine but history of ESRD on hemodialysis.  For best I can tell he did not run today but he does not to be run emergently.  Discussed with hospitalist.  They do not want a LP at this time but they are asking for an MRI.  Patient can not go home at this time due to his significant altered mental status.  Will admit for further evaluation.  He was hemodynamically stable.  Walking around the emergency department.  No focal neuro deficits.  Cranial nerves 2-12 grossly intact.    Amount and/or Complexity of Data Reviewed  External Data Reviewed: notes.     Details: See ED course  Labs: ordered. Decision-making details documented in ED Course.  Radiology: ordered and independent interpretation performed. Decision-making details documented in ED Course.     Details: CT unremarkable for any acute process.    Risk  Decision regarding hospitalization.           ED Course as of 10/01/23 0759   [place] Oct 01, 2023   0159 Chart review reveals that patient was seen earlier yesterday 09/30/2023.  Suffered a mechanical fall.  Struck head.  Had a lac repair.  Was given lidocaine injection as well as  "Boostrix.  CT scan was negative at that time. [MM]   0300 CT Head Without Contrast  No acute findings by my review. [MM]   0342 Alcohol, Serum: <10.0 [MM]   0412 Comprehensive metabolic panel(!)  Elevated BUN and creatinine.  History of ESRD on hemodialysis.  No significant electrolyte abnormalities. [MM]   0412 Magnesium : 2.50 [MM]   0412 Thyroid Stimulating Hormone: 0.375 [MM]   0412 INR: 1.0 [MM]   0412 Troponin I: 0.026 [MM]   0412 Alcohol, Serum: <10.0 [MM]   0429 CBC auto differential(!)  No leukocytosis , improved anemia [MM]   0432 EKG from 04/26 shows sinus rhythm rate of 81.  .  Left axis deviation.  No ST elevation or depression.  Left bundle-branch block morphology. [MM]   0449 Paged hospitalist  [JOSÉ]   0503 Note from 09/28/2023 shows "63 y.o. male with hx of ESRD on dialysis presents to ED for for routine dialysis. Dialysis schedule was 3 days per week, and has been decreased to twice a week on Mondays and Thursdays. Pt is an undocumented  immigrant and typically gets dialyzed in the ED. He reports minimal LE swelling. Denies CP, SOB, N/V, abdominal pain. Does still currently make urine and denies new or worsening urinary sxs. VSS in ED. Admitted to Internal Medicine services for dialysis. " [MM]   0512 Call and consult with hospitalist  [JOSÉ]      ED Course User Index  [JOSÉ] Lowell Rodrigez  [MM] Collin Garcia MD                    Clinical Impression:   Final diagnoses:  [R53.1] Weakness  [R41.82] Altered mental status, unspecified altered mental status type  [G93.40] Encephalopathy acute (Primary)  [N18.6, Z99.2] ESRD on hemodialysis        ED Disposition Condition    Admit                 Collin Garcia MD  10/01/23 0759    "

## 2023-10-02 LAB
ALBUMIN SERPL-MCNC: 2.9 G/DL (ref 3.4–4.8)
ALBUMIN/GLOB SERPL: 0.9 RATIO (ref 1.1–2)
ALP SERPL-CCNC: 55 UNIT/L (ref 40–150)
ALT SERPL-CCNC: 12 UNIT/L (ref 0–55)
AST SERPL-CCNC: 11 UNIT/L (ref 5–34)
BASOPHILS # BLD AUTO: 0.06 X10(3)/MCL
BASOPHILS NFR BLD AUTO: 0.8 %
BILIRUB SERPL-MCNC: 0.4 MG/DL
BUN SERPL-MCNC: 52.8 MG/DL (ref 8.4–25.7)
CALCIUM SERPL-MCNC: 8.9 MG/DL (ref 8.8–10)
CHLORIDE SERPL-SCNC: 97 MMOL/L (ref 98–107)
CO2 SERPL-SCNC: 23 MMOL/L (ref 23–31)
CREAT SERPL-MCNC: 10.37 MG/DL (ref 0.73–1.18)
EOSINOPHIL # BLD AUTO: 0.05 X10(3)/MCL (ref 0–0.9)
EOSINOPHIL NFR BLD AUTO: 0.7 %
ERYTHROCYTE [DISTWIDTH] IN BLOOD BY AUTOMATED COUNT: 14.7 % (ref 11.5–17)
GFR SERPLBLD CREATININE-BSD FMLA CKD-EPI: 5 MLS/MIN/1.73/M2
GLOBULIN SER-MCNC: 3.1 GM/DL (ref 2.4–3.5)
GLUCOSE SERPL-MCNC: 104 MG/DL (ref 82–115)
HCT VFR BLD AUTO: 33.4 % (ref 42–52)
HGB BLD-MCNC: 10.8 G/DL (ref 14–18)
IMM GRANULOCYTES # BLD AUTO: 0.03 X10(3)/MCL (ref 0–0.04)
IMM GRANULOCYTES NFR BLD AUTO: 0.4 %
LYMPHOCYTES # BLD AUTO: 1.07 X10(3)/MCL (ref 0.6–4.6)
LYMPHOCYTES NFR BLD AUTO: 14.5 %
MAGNESIUM SERPL-MCNC: 2.5 MG/DL (ref 1.6–2.6)
MCH RBC QN AUTO: 24.4 PG (ref 27–31)
MCHC RBC AUTO-ENTMCNC: 32.3 G/DL (ref 33–36)
MCV RBC AUTO: 75.6 FL (ref 80–94)
MONOCYTES # BLD AUTO: 0.49 X10(3)/MCL (ref 0.1–1.3)
MONOCYTES NFR BLD AUTO: 6.6 %
NEUTROPHILS # BLD AUTO: 5.69 X10(3)/MCL (ref 2.1–9.2)
NEUTROPHILS NFR BLD AUTO: 77 %
NRBC BLD AUTO-RTO: 0 %
PHOSPHATE SERPL-MCNC: 6.8 MG/DL (ref 2.3–4.7)
PLATELET # BLD AUTO: 242 X10(3)/MCL (ref 130–400)
PMV BLD AUTO: 8.9 FL (ref 7.4–10.4)
POTASSIUM SERPL-SCNC: 4.4 MMOL/L (ref 3.5–5.1)
PROT SERPL-MCNC: 6 GM/DL (ref 5.8–7.6)
RBC # BLD AUTO: 4.42 X10(6)/MCL (ref 4.7–6.1)
SODIUM SERPL-SCNC: 133 MMOL/L (ref 136–145)
WBC # SPEC AUTO: 7.39 X10(3)/MCL (ref 4.5–11.5)

## 2023-10-02 PROCEDURE — 84100 ASSAY OF PHOSPHORUS: CPT | Performed by: INTERNAL MEDICINE

## 2023-10-02 PROCEDURE — 63600175 PHARM REV CODE 636 W HCPCS: Performed by: NURSE PRACTITIONER

## 2023-10-02 PROCEDURE — 85025 COMPLETE CBC W/AUTO DIFF WBC: CPT | Performed by: NURSE PRACTITIONER

## 2023-10-02 PROCEDURE — 25000003 PHARM REV CODE 250: Performed by: NURSE PRACTITIONER

## 2023-10-02 PROCEDURE — 80100016 HC MAINTENANCE HEMODIALYSIS

## 2023-10-02 PROCEDURE — 80053 COMPREHEN METABOLIC PANEL: CPT | Performed by: NURSE PRACTITIONER

## 2023-10-02 PROCEDURE — 21400001 HC TELEMETRY ROOM

## 2023-10-02 PROCEDURE — 11000001 HC ACUTE MED/SURG PRIVATE ROOM

## 2023-10-02 PROCEDURE — 27000221 HC OXYGEN, UP TO 24 HOURS

## 2023-10-02 PROCEDURE — 83735 ASSAY OF MAGNESIUM: CPT | Performed by: INTERNAL MEDICINE

## 2023-10-02 RX ADMIN — BACLOFEN 10 MG: 10 TABLET ORAL at 08:10

## 2023-10-02 RX ADMIN — HEPARIN SODIUM 5000 UNITS: 5000 INJECTION, SOLUTION INTRAVENOUS; SUBCUTANEOUS at 05:10

## 2023-10-02 RX ADMIN — AMLODIPINE BESYLATE 10 MG: 5 TABLET ORAL at 08:10

## 2023-10-02 RX ADMIN — HYDRALAZINE HYDROCHLORIDE 75 MG: 50 TABLET, FILM COATED ORAL at 08:10

## 2023-10-02 RX ADMIN — HEPARIN SODIUM 5000 UNITS: 5000 INJECTION, SOLUTION INTRAVENOUS; SUBCUTANEOUS at 10:10

## 2023-10-02 RX ADMIN — ISOSORBIDE DINITRATE 20 MG: 20 TABLET ORAL at 08:10

## 2023-10-02 RX ADMIN — CARVEDILOL 25 MG: 12.5 TABLET, FILM COATED ORAL at 08:10

## 2023-10-02 NOTE — PROGRESS NOTES
Progress Note  Nephrology    Admit Date: 10/1/2023   LOS: 1 day     SUBJECTIVE:     Follow-up For:  ESRD    Interval History:  64 Y/O male with history of ESRD , dialyzed in [facility] and discharged after but fell and sustained laceration of scalp  , laceration was stitched and currently doing good , I saw this pt during dialysis today  he denies any problem and tolerating dialysis     Review of Systems:  Constitutional: No fever or chills  Respiratory: No cough or shortness of breath  Cardiovascular: No chest pain or palpitations  Gastrointestinal: No nausea or vomiting  Neurological: No confusion or weakness    OBJECTIVE:     Vital Signs Range (Last 24H):  Vitals:    10/02/23 1050   BP:    Pulse: 68   Resp:    Temp:        Temp:  [97 °F (36.1 °C)-97.1 °F (36.2 °C)]   Pulse:  [62-79]   Resp:  [11-18]   BP: (122-165)/(57-86)   SpO2:  [93 %-100 %]     I & O (Last 24H):No intake or output data in the 24 hours ending 10/02/23 1503    Physical Exam:  Alert , oriented , in NAD  On dialysis without any problem   Head   normal   Neck   supple   Chest   symmetric , no retraction   Lungs   clear   Heart   RRR  Abdomen   soft , non tender   Ext   no edema     Laboratory Data:    Recent Labs   Lab 10/02/23  0247   *   K 4.4   CO2 23   BUN 52.8*   CREATININE 10.37*   GLUCOSE 104   CALCIUM 8.9   PHOS 6.8*     Lab Results   Component Value Date    .5 (H) 09/22/2023    CALCIUM 8.9 10/02/2023    CAION 1.16 10/01/2023    PHOS 6.8 (H) 10/02/2023     Lab Results   Component Value Date    IRON 63 (L) 09/11/2023    TIBC 197 (L) 09/11/2023    FERRITIN 487.55 (H) 09/11/2023       Medications:  Medication list was reviewed and changes noted under Assessment/Plan.    Diagnostic Results:    Reviewed most recent CT/US/Echo/MRI    ASSESSMENT/PLAN:   1   ESRD  2   Syncope  3   HTN  4   SHPT  5    CAD    Plan    lab test in AM              Ok to schadule out pt dialysis

## 2023-10-02 NOTE — NURSING
10/02/23 1647   Post-Hemodialysis Assessment   Rinseback Volume (mL) 500 mL   Blood Volume Processed (Liters) 54.1 L   Dialyzer Clearance Lightly streaked   Duration of Treatment 180 minutes   Total UF (mL) 2000 mL   Net Fluid Removal 1500   Patient Response to Treatment Tolerated well   Post-Hemodialysis Comments Tx completed, pt reinfused. CVC deaccessed per P&P, lines flushed and locked with NS and new Clear Guard caps applied. Pt ran for 3 hours, NET removed= 1500ml.

## 2023-10-03 LAB
ALBUMIN SERPL-MCNC: 3 G/DL (ref 3.4–4.8)
BUN SERPL-MCNC: 23.9 MG/DL (ref 8.4–25.7)
CALCIUM SERPL-MCNC: 8.5 MG/DL (ref 8.8–10)
CHLORIDE SERPL-SCNC: 98 MMOL/L (ref 98–107)
CO2 SERPL-SCNC: 23 MMOL/L (ref 23–31)
CREAT SERPL-MCNC: 6.38 MG/DL (ref 0.73–1.18)
GFR SERPLBLD CREATININE-BSD FMLA CKD-EPI: 9 MLS/MIN/1.73/M2
GLUCOSE SERPL-MCNC: 92 MG/DL (ref 82–115)
PHOSPHATE SERPL-MCNC: 5.6 MG/DL (ref 2.3–4.7)
POCT GLUCOSE: 79 MG/DL (ref 70–110)
POTASSIUM SERPL-SCNC: 4.2 MMOL/L (ref 3.5–5.1)
SODIUM SERPL-SCNC: 134 MMOL/L (ref 136–145)
VIT B12 SERPL-MCNC: 882 PG/ML (ref 213–816)

## 2023-10-03 PROCEDURE — 93005 ELECTROCARDIOGRAM TRACING: CPT

## 2023-10-03 PROCEDURE — 80069 RENAL FUNCTION PANEL: CPT | Performed by: INTERNAL MEDICINE

## 2023-10-03 PROCEDURE — 63600175 PHARM REV CODE 636 W HCPCS: Performed by: NURSE PRACTITIONER

## 2023-10-03 PROCEDURE — 27000221 HC OXYGEN, UP TO 24 HOURS

## 2023-10-03 PROCEDURE — 25000003 PHARM REV CODE 250: Performed by: INTERNAL MEDICINE

## 2023-10-03 PROCEDURE — 21400001 HC TELEMETRY ROOM

## 2023-10-03 PROCEDURE — 25000003 PHARM REV CODE 250: Performed by: NURSE PRACTITIONER

## 2023-10-03 PROCEDURE — 82607 VITAMIN B-12: CPT | Performed by: INTERNAL MEDICINE

## 2023-10-03 PROCEDURE — 94761 N-INVAS EAR/PLS OXIMETRY MLT: CPT

## 2023-10-03 PROCEDURE — 93010 EKG 12-LEAD: ICD-10-PCS | Mod: ,,, | Performed by: INTERNAL MEDICINE

## 2023-10-03 PROCEDURE — 11000001 HC ACUTE MED/SURG PRIVATE ROOM

## 2023-10-03 PROCEDURE — 93010 ELECTROCARDIOGRAM REPORT: CPT | Mod: ,,, | Performed by: INTERNAL MEDICINE

## 2023-10-03 RX ORDER — HYDRALAZINE HYDROCHLORIDE 50 MG/1
100 TABLET, FILM COATED ORAL EVERY 8 HOURS
Status: DISCONTINUED | OUTPATIENT
Start: 2023-10-03 | End: 2023-10-06 | Stop reason: HOSPADM

## 2023-10-03 RX ORDER — MECLIZINE HYDROCHLORIDE 25 MG/1
25 TABLET ORAL 3 TIMES DAILY PRN
Status: DISCONTINUED | OUTPATIENT
Start: 2023-10-03 | End: 2023-10-06 | Stop reason: HOSPADM

## 2023-10-03 RX ADMIN — ONDANSETRON 4 MG: 2 INJECTION INTRAMUSCULAR; INTRAVENOUS at 09:10

## 2023-10-03 RX ADMIN — HYDRALAZINE HYDROCHLORIDE 100 MG: 50 TABLET, FILM COATED ORAL at 11:10

## 2023-10-03 RX ADMIN — SEVELAMER CARBONATE 800 MG: 800 TABLET, FILM COATED ORAL at 05:10

## 2023-10-03 RX ADMIN — ISOSORBIDE DINITRATE 20 MG: 20 TABLET ORAL at 11:10

## 2023-10-03 RX ADMIN — CARVEDILOL 25 MG: 12.5 TABLET, FILM COATED ORAL at 09:10

## 2023-10-03 RX ADMIN — ATORVASTATIN CALCIUM 40 MG: 40 TABLET, FILM COATED ORAL at 09:10

## 2023-10-03 RX ADMIN — HYDRALAZINE HYDROCHLORIDE 75 MG: 50 TABLET, FILM COATED ORAL at 09:10

## 2023-10-03 RX ADMIN — BACLOFEN 10 MG: 10 TABLET ORAL at 09:10

## 2023-10-03 RX ADMIN — SEVELAMER CARBONATE 800 MG: 800 TABLET, FILM COATED ORAL at 11:10

## 2023-10-03 RX ADMIN — ISOSORBIDE DINITRATE 20 MG: 20 TABLET ORAL at 09:10

## 2023-10-03 RX ADMIN — THERA TABS 1 TABLET: TAB at 11:10

## 2023-10-03 RX ADMIN — SEVELAMER CARBONATE 800 MG: 800 TABLET, FILM COATED ORAL at 09:10

## 2023-10-03 RX ADMIN — THERA TABS 1 TABLET: TAB at 09:10

## 2023-10-03 RX ADMIN — ISOSORBIDE DINITRATE 20 MG: 20 TABLET ORAL at 02:10

## 2023-10-03 RX ADMIN — AMLODIPINE BESYLATE 10 MG: 5 TABLET ORAL at 11:10

## 2023-10-03 RX ADMIN — CARVEDILOL 25 MG: 12.5 TABLET, FILM COATED ORAL at 11:10

## 2023-10-03 RX ADMIN — HYDRALAZINE HYDROCHLORIDE 100 MG: 50 TABLET, FILM COATED ORAL at 02:10

## 2023-10-03 RX ADMIN — HEPARIN SODIUM 5000 UNITS: 5000 INJECTION, SOLUTION INTRAVENOUS; SUBCUTANEOUS at 05:10

## 2023-10-03 NOTE — PLAN OF CARE
10/03/23 1141   Discharge Assessment   Assessment Type Discharge Planning Assessment   Confirmed/corrected address, phone number and insurance Yes   Confirmed Demographics Correct on Facesheet   Source of Information patient;family   Communicated YOHANNES with patient/caregiver Date not available/Unable to determine   Reason For Admission encephalopathy   People in Home child(jamari), adult   Do you expect to return to your current living situation? Yes   Prior to hospitilization cognitive status: Alert/Oriented   Current cognitive status:   (confused off and on)   Home Layout Able to live on 1st floor   Equipment Currently Used at Home none   Readmission within 30 days? Yes   Patient currently being followed by outpatient case management? No   Do you currently have service(s) that help you manage your care at home? No   Do you have prescription coverage? No   Do you have any problems affording any of your prescribed medications? TBD   Who is going to help you get home at discharge? family   How do you get to doctors appointments? family or friend will provide   Are you on dialysis? Yes   Dialysis Name and Scheduled days no assigned clinic   Do you take coumadin? No   DME Needed Upon Discharge  none   Discharge Plan discussed with: Patient;Adult children   Transition of Care Barriers DIalysis placement issues;Social   Discharge Plan A Home   Discharge Plan B Home with family

## 2023-10-03 NOTE — PROGRESS NOTES
Hospital Medicine  Progress Note    Patient Name: Ryann Ly  MRN: [id number]  Status: IP- Inpatient   Admission Date: 10/1/2023  Length of Stay: 1  Date of Service: 10/02/2023       CC: hospital follow-up for AMS       SUBJECTIVE   63 y.o. male with a history that includes ESRD on HD; presents to the [facility] ED on 10/1/2023  with mental status changes and weakness. He reported ghaving HD on 9/30 at [facility].  It was reported he had N/V and after treatment fell, hit his head on the concrete outside of the hospital after discharge.  He was brought back to the ED at [facility] where he had a right parietal laceration that was repaired; he was the discharged home.   Patient reported mental status changes and confusion since then. Evaluation in ED noted patient HDS, stable on room air. Workup included negative CT head and MRI brain.  BUN was 42, Cr 8.8.  Patient admitted to hospital medicine services, Nephrology consulted.    Today: Patient seen and examined at bedside, and chart reviewed.  Patient undergoing HD today      MEDICATIONS   Scheduled   amLODIPine  10 mg Oral QHS    atorvastatin  40 mg Oral Daily    baclofen  10 mg Oral BID    calcitRIOL  0.25 mcg Oral Every other day    carvediloL  25 mg Oral BID    heparin (porcine)  5,000 Units Subcutaneous Q8H    hydrALAZINE  75 mg Oral TID    isosorbide dinitrate  20 mg Oral TID    sevelamer carbonate  800 mg Oral TID WM     Continuous Infusions  None      PHYSICAL EXAM   VITALS: T 98 °F (36.7 °C)   BP (!) 188/85   P 74   RR 18   O2 98 %    GENERAL: Awake and in NAD  LUNGS: CTA anteriorly  CVS: Normal rate  GI/: Soft, NT, bowel sounds positive.  EXTREMITIES: No peripheral edema  NEURO: Awake, alert, partially oriented  PSYCH: Cooperative      LABS   CBC  Recent Labs     10/01/23  0300 10/02/23  0247   WBC 10.66 7.39   RBC 4.64* 4.42*   HGB 11.6* 10.8*   HCT 35.3* 33.4*   MCV 76.1* 75.6*   MCH 25.0* 24.4*   MCHC 32.9* 32.3*   RDW 14.9 14.7    242     CHEM  Recent Labs      09/30/23  1336 10/01/23  0300 10/02/23  0247    135* 133*   K 4.5 4.2 4.4   CHLORIDE 98 98 97*   CO2 24 21* 23   BUN 37.0* 42.9* 52.8*   CREATININE 8.29* 8.81* 10.37*   GLUCOSE 114 111 104   CALCIUM 9.6 9.4 8.9   MG 2.50 2.50 2.50   PHOS 4.8*  --  6.8*   ALBUMIN 3.4 3.3* 2.9*   GLOBULIN 3.7* 3.5 3.1   ALKPHOS 59 62 55   ALT 20 17 12   AST 13 13 11   BILITOT 0.5 0.5 0.4   TSH  --  0.375  --        ASSESSMENT   Acute encephalopathy, possibly metabolic vs concussion  HTN Urgency  ESRD on HD  Essential HTN    PLAN   Continue current management and close monitoring  Ongoing HD per Nephrology        Prophylaxis: SC heaprin        Sena Valdez MD  [facility] Medicine

## 2023-10-03 NOTE — PLAN OF CARE
" services used [id number] spoke to patient and son present in room both speak minimal English. Difficult to get them to understand what I am asking despite use of . Keeps asking me what is the solution. Able to make out that patient has been here about 3 years. 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 Has a permanent address in [place].  His wife is in [place] unable to decipher if she lives there or is visiting. Has been getting dialyzed at [facility] through the ER.   "

## 2023-10-03 NOTE — NURSING
Nurses Note -- 4 Eyes      10/2/2023   1200 PM      Skin assessed during: Admit      [x] No Altered Skin Integrity Present    []Prevention Measures Documented      [] Yes- Altered Skin Integrity Present or Discovered   [] LDA Added if Not in Epic (Describe Wound)   [] New Altered Skin Integrity was Present on Admit and Documented in LDA   [] Wound Image Taken    Wound Care Consulted? No    Attending Nurse:  Patricia Damon RN    Second RN/Staff Member:   Jacqueline Figueroa

## 2023-10-03 NOTE — PROGRESS NOTES
Progress Note  Nephrology    Admit Date: 10/1/2023   LOS: 2 days     SUBJECTIVE:     Follow-up For:  ESRD    Interval History:  64 Y/O male with history of ESRD , usually dialyzing in [facility] fell in parking lot of [facility] , sustained scalp laceration , stitched and referd to this hospital for eval   Pt had HD yesterday denies any complaint today except head aches   MRI of brain showed  Small vessel disease     Review of Systems:  Constitutional: No fever or chills  Respiratory: No cough or shortness of breath  Cardiovascular: No chest pain or palpitations  Gastrointestinal: No nausea or vomiting  Neurological: No confusion or weakness    OBJECTIVE:     Vital Signs Range (Last 24H):  Vitals:    10/03/23 1133   BP: (!) 145/77   Pulse: 68   Resp: 18   Temp: 98.6 °F (37 °C)       Temp:  [97.5 °F (36.4 °C)-98.6 °F (37 °C)]   Pulse:  [65-74]   Resp:  [16-18]   BP: (130-188)/(69-85)   SpO2:  [92 %-98 %]     I & O (Last 24H):  Intake/Output Summary (Last 24 hours) at 10/3/2023 1516  Last data filed at 10/3/2023 1300  Gross per 24 hour   Intake 0 ml   Output 2250 ml   Net -2250 ml       Physical Exam:  Alert  oriented , in NAD  Head   normal   Neck   supple   Chest   symmetric   Lungs   clear   Heart   RRR  Abdomen   soft , non tender   Ext   no edema     Laboratory Data:    Recent Labs   Lab 10/03/23  0420   *   K 4.2   CO2 23   BUN 23.9   CREATININE 6.38*   GLUCOSE 92   CALCIUM 8.5*   PHOS 5.6*     Lab Results   Component Value Date    .5 (H) 09/22/2023    CALCIUM 8.5 (L) 10/03/2023    CAION 1.16 10/01/2023    PHOS 5.6 (H) 10/03/2023     Lab Results   Component Value Date    IRON 63 (L) 09/11/2023    TIBC 197 (L) 09/11/2023    FERRITIN 487.55 (H) 09/11/2023       Medications:  Medication list was reviewed and changes noted under Assessment/Plan.    Diagnostic Results:    Reviewed most recent CT/US/Echo/MRI    ASSESSMENT/PLAN:   1   ESRD  2   Syncope  3   HTN  4   SHPT  5   CAD      Plan    continue dialysis MWF               Ok to go home from Renal stand point

## 2023-10-03 NOTE — PROGRESS NOTES
[facility] - 5th Floor Med Surg  \[facility]\"" MEDICINE ~ PROGRESS NOTE        CHIEF COMPLAINT   Hospital follow up    HOSPITAL COURSE   63 y.o. male with a history that includes ESRD on HD; presents to the [facility] ED on 10/1/2023  with mental status changes and weakness. He reported ghaving HD on 9/30 at [facility].  It was reported he had N/V and after treatment fell, hit his head on the concrete outside of the hospital after discharge.  He was brought back to the ED at [facility] where he had a right parietal laceration that was repaired; he was the discharged home.   Patient reported mental status changes and confusion since then. Evaluation in ED noted patient HDS, stable on room air. Workup included negative CT head and MRI brain.  BUN was 42, Cr 8.8.  Patient admitted to hospital medicine services, Nephrology consulted.    Today  Spoke with his primary nephrology nurse practitioner [facility].  Very weird case.  Unclear exactly what his family dynamics is and why he is here to begin with.  She has requested kidney biopsy, IR consulted.  He has been having some issues with short-term memory she stated.  He would ask the same questions over and over again every time.  Spoke with the wife over the phone as well and the son was in the room.  Wife stated that he has been having issues with on and off confusion for about 1 month now even before dialysis was started.  I will order an EEG as well as consult Neurology.        OBJECTIVE/PHYSICAL EXAM     VITAL SIGNS (MOST RECENT):  Temp: 98 °F (36.7 °C) (10/03/23 0739)  Pulse: 71 (10/03/23 0739)  Resp: 17 (10/03/23 0739)  BP: (!) 157/80 (10/03/23 0739)  SpO2: (!) 92 % (10/03/23 0739) VITAL SIGNS (24 HOUR RANGE):  Temp:  [97 °F (36.1 °C)-98.2 °F (36.8 °C)] 98 °F (36.7 °C)  Pulse:  [64-74] 71  Resp:  [12-18] 17  SpO2:  [92 %-100 %] 92 %  BP: (130-188)/(69-85) 157/80   GENERAL: In no acute distress, afebrile  HEENT:  CHEST: Clear to auscultation  bilaterally  HEART: S1, S2, no appreciable murmur  ABDOMEN: Soft, nontender, BS +  MSK: Warm, no lower extremity edema, no clubbing or cyanosis  NEUROLOGIC:  Alert but occasional blank stares, disoriented or unable to answer questions despite wife asking him the questions  INTEGUMENTARY:  PSYCHIATRY:        ASSESSMENT/PLAN   Encephalopathy x1 month predates starting dialysis  Acute kidney injury versus ESRD requiring HD  Nephrotic range proteinuria  Fall with head trauma    History of: Recently started on HD, hypertension      Nephrology following.  IR consulted for biopsy.  Spoke with Eleanor Carlos and requested.  Unclear why renal failure.  Will ask Neurology for evaluation as well.  Obtain EEG.  Maybe even consider LP?  Check B12, folate, syphilis.  Start MVI.  Have to discuss further with wife and son to see what the social history is on this patient.  [facility] .  Wife is in [place].    DVT prophylaxis:     Anticipated discharge and disposition:   __________________________________________________________________________    LABS/MICRO/MEDS/DIAGNOSTICS       LABS  Recent Labs     10/02/23  0247 10/03/23  0420   * 134*   K 4.4 4.2   CHLORIDE 97* 98   CO2 23 23   BUN 52.8* 23.9   CREATININE 10.37* 6.38*   GLUCOSE 104 92   CALCIUM 8.9 8.5*   ALKPHOS 55  --    AST 11  --    ALT 12  --    ALBUMIN 2.9* 3.0*     Recent Labs     10/02/23  0247   WBC 7.39   RBC 4.42*   HCT 33.4*   MCV 75.6*          MICROBIOLOGY  Microbiology Results (last 7 days)       ** No results found for the last 168 hours. **               MEDICATIONS   amLODIPine  10 mg Oral QHS    atorvastatin  40 mg Oral Daily    baclofen  10 mg Oral BID    calcitRIOL  0.25 mcg Oral Every other day    carvediloL  25 mg Oral BID    heparin (porcine)  5,000 Units Subcutaneous Q8H    hydrALAZINE  100 mg Oral Q8H    isosorbide dinitrate  20 mg Oral TID    sevelamer carbonate  800 mg Oral TID WM         INFUSIONS          DIAGNOSTIC TESTS  X-Ray Chest 1 View   Final Result      No acute cardiopulmonary process.  Interval resolution of right lower lobe opacification.         Electronically signed by: Vincenzo Winn   Date:    10/01/2023   Time:    08:16      MRI Brain Without Contrast   Final Result      CT Head Without Contrast   Final Result      No acute intracranial abnormality identified.  Findings of chronic microvascular ischemic disease.         Electronically signed by: Vincenzo Winn   Date:    10/01/2023   Time:    08:05           Nuc Stress EF   Date Value Ref Range Status   09/13/2023 55 % Final     Nuc Rest EF   Date Value Ref Range Status   09/13/2023 61  Final          NUTRITION STATUS  Patient meets ASPEN criteria for   malnutrition of   per RD assessment as evidenced by:                       A minimum of two characteristics is recommended for diagnosis of either severe or non-severe malnutrition.       Case related differential diagnoses have been reviewed; assessment and plan has been documented. I have personally reviewed the labs and test results that are currently available; I have reviewed the patients medication list. I have reviewed the consulting providers recommendations. I have reviewed or attempted to review medical records based upon their availability.  All of the patient's and/or family's questions have been addressed and answered to the best of my ability.  I will continue to monitor closely and make adjustments to medical management as needed.  This document was created using M*Modal Fluency Direct.  Transcription errors may have been made.  Please contact me if any questions may rise regarding documentation to clarify transcription.        Kuldeep Gupta MD   Internal Medicine  Department of Hospital Medicine  [facility] - 5th Floor Med Surg

## 2023-10-03 NOTE — PROGRESS NOTES
"Inpatient Nutrition Evaluation    Admit Date: 10/1/2023   Total duration of encounter: 2 days    Nutrition Recommendation/Prescription     Continue Renal diet as tolerated  Monitor weight trends    Nutrition Assessment     Chart Review    Reason Seen: continuous nutrition monitoring    Malnutrition Screening Tool Results   Have you recently lost weight without trying?: Unsure  Have you been eating poorly because of a decreased appetite?: No   MST Score: 2     Diagnosis:  Encephalopathy x1 month predates starting dialysis  Acute kidney injury versus ESRD requiring HD  Nephrotic range proteinuria  Fall with head trauma    Relevant Medical History: Recently started on HD, hypertension    Nutrition-Related Medications: calcitriol, MVI, Renvela    Nutrition-Related Labs:  10/3: Na-134, Crea-6.38, Ca-8.5, Phos-5.6    Diet Order: DIET RENAL ON DIALYSIS  Diet NPO Except for: Medication  Oral Supplement Order: none  Appetite/Oral Intake: good/% of meals  Factors Affecting Nutritional Intake: none identified  Food/Tenriism/Cultural Preferences: none reported  Food Allergies: unable to obtain       Wound(s):   intact    Comments    10/3: pt's son answered questions. Pt noted he keeps getting cold in room. Son stated pt ate well for breakfast. Noted no recent wt loss. Per EMR, wt loss noted by 10#. However was recently started on dialysis. Will monitor wt trends.     Anthropometrics    Height: 5' 2.01" (157.5 cm) Height Method: Stated  Last Weight: 50 kg (110 lb 3.7 oz) (10/03/23 1205)    BMI (Calculated): 20.2  BMI Classification: normal (BMI 18.5-24.9)        Ideal Body Weight (IBW), Male: 118.06 lb     % Ideal Body Weight, Male (lb): 93.37 %                          Usual Weight Provided By: EMR weight history    Wt Readings from Last 5 Encounters:   10/03/23 50 kg (110 lb 3.7 oz)   09/30/23 50 kg (110 lb 3.7 oz)   09/30/23 50.1 kg (110 lb 7.2 oz)   09/28/23 53.4 kg (117 lb 13.4 oz)   09/27/23 52.5 kg (115 lb 11.9 oz) "     Weight Change(s) Since Admission:  Admit Weight: 50 kg (110 lb 3.7 oz) (10/03/23 1205)  10/3: 50kg    Patient Education    Not applicable.    Monitoring & Evaluation     Dietitian will monitor food and beverage intake.  Nutrition Risk/Follow-Up: low (follow-up in 5-7 days)  Patients assigned 'low nutrition risk' status do not qualify for a full nutritional assessment but will be monitored and re-evaluated in a 5-7 day time period. Please consult if re-evaluation needed sooner.

## 2023-10-04 LAB
ALBUMIN SERPL-MCNC: 3.1 G/DL (ref 3.4–4.8)
ALBUMIN/GLOB SERPL: 1 RATIO (ref 1.1–2)
ALLENS TEST BLOOD GAS (OHS): YES
ALP SERPL-CCNC: 58 UNIT/L (ref 40–150)
ALT SERPL-CCNC: 11 UNIT/L (ref 0–55)
AMMONIA PLAS-MSCNC: 16.1 UMOL/L (ref 18–72)
APPEARANCE CSF: CLEAR
AST SERPL-CCNC: 10 UNIT/L (ref 5–34)
BASE EXCESS BLD CALC-SCNC: 1.7 MMOL/L (ref -2–2)
BASOPHILS # BLD AUTO: 0.07 X10(3)/MCL
BASOPHILS NFR BLD AUTO: 1.1 %
BILIRUB SERPL-MCNC: 0.4 MG/DL
BLOOD GAS SAMPLE TYPE (OHS): ABNORMAL
BUN SERPL-MCNC: 41.8 MG/DL (ref 8.4–25.7)
C GATTII+NEOFOR DNA CSF QL NAA+NON-PROBE: NOT DETECTED
CA-I BLD-SCNC: 1.1 MMOL/L (ref 1.12–1.23)
CALCIUM SERPL-MCNC: 8.8 MG/DL (ref 8.8–10)
CHLORIDE SERPL-SCNC: 97 MMOL/L (ref 98–107)
CMV DNA CSF QL NAA+NON-PROBE: NOT DETECTED
CO2 BLDA-SCNC: 27 MMOL/L
CO2 SERPL-SCNC: 23 MMOL/L (ref 23–31)
COHGB MFR BLDA: 1 % (ref 0.5–1.5)
COLOR CSF: COLORLESS
CREAT SERPL-MCNC: 8.53 MG/DL (ref 0.73–1.18)
DRAWN BY BLOOD GAS (OHS): ABNORMAL
E COLI K1 DNA CSF QL NAA+NON-PROBE: NOT DETECTED
EOSINOPHIL # BLD AUTO: 0.27 X10(3)/MCL (ref 0–0.9)
EOSINOPHIL NFR BLD AUTO: 4.4 %
ERYTHROCYTE [DISTWIDTH] IN BLOOD BY AUTOMATED COUNT: 14.6 % (ref 11.5–17)
EV RNA CSF QL NAA+NON-PROBE: NOT DETECTED
FOLATE SERPL-MCNC: 11.3 NG/ML (ref 7–31.4)
GFR SERPLBLD CREATININE-BSD FMLA CKD-EPI: 7 MLS/MIN/1.73/M2
GLOBULIN SER-MCNC: 3 GM/DL (ref 2.4–3.5)
GLUCOSE CSF-MCNC: 56 MG/DL (ref 40–70)
GLUCOSE SERPL-MCNC: 78 MG/DL (ref 82–115)
GP B STREP DNA CSF QL NAA+NON-PROBE: NOT DETECTED
HAEM INFLU DNA CSF QL NAA+NON-PROBE: NOT DETECTED
HCO3 BLDA-SCNC: 25.8 MMOL/L (ref 22–26)
HCT VFR BLD AUTO: 37.4 % (ref 42–52)
HGB BLD-MCNC: 12.5 G/DL (ref 14–18)
HHV6 DNA CSF QL NAA+NON-PROBE: NOT DETECTED
HSV1 DNA CSF QL NAA+NON-PROBE: NOT DETECTED
HSV2 DNA CSF QL NAA+NON-PROBE: NOT DETECTED
IMM GRANULOCYTES # BLD AUTO: 0.03 X10(3)/MCL (ref 0–0.04)
IMM GRANULOCYTES NFR BLD AUTO: 0.5 %
L MONOCYTOG DNA CSF QL NAA+NON-PROBE: NOT DETECTED
LPM (OHS): 3
LYMPHOCYTE MANUAL CSF (OHS): 79 %
LYMPHOCYTES # BLD AUTO: 0.88 X10(3)/MCL (ref 0.6–4.6)
LYMPHOCYTES NFR BLD AUTO: 14.2 %
MCH RBC QN AUTO: 25.5 PG (ref 27–31)
MCHC RBC AUTO-ENTMCNC: 33.4 G/DL (ref 33–36)
MCV RBC AUTO: 76.2 FL (ref 80–94)
METHGB MFR BLDA: 0.9 % (ref 0.4–1.5)
MONOCYTE MANUAL CSF (OHS): 17 %
MONOCYTES # BLD AUTO: 0.51 X10(3)/MCL (ref 0.1–1.3)
MONOCYTES NFR BLD AUTO: 8.2 %
N MEN DNA CSF QL NAA+NON-PROBE: NOT DETECTED
NEUTROPHILS # BLD AUTO: 4.44 X10(3)/MCL (ref 2.1–9.2)
NEUTROPHILS MAN CSF (OHS): 4 %
NEUTROPHILS NFR BLD AUTO: 71.6 %
NRBC BLD AUTO-RTO: 0 %
O2 HB BLOOD GAS (OHS): 96.6 % (ref 94–97)
OXYGEN DEVICE BLOOD GAS (OHS): ABNORMAL
OXYHGB MFR BLDA: 12 G/DL (ref 12–16)
PARECHOVIRUS A RNA CSF QL NAA+NON-PROBE: NOT DETECTED
PCO2 BLDA: 38 MMHG (ref 35–45)
PH BLDA: 7.44 [PH] (ref 7.35–7.45)
PLATELET # BLD AUTO: 266 X10(3)/MCL (ref 130–400)
PMV BLD AUTO: 8.8 FL (ref 7.4–10.4)
PO2 BLDA: 133 MMHG (ref 80–100)
POTASSIUM BLOOD GAS (OHS): 3.9 MMOL/L (ref 3.5–5)
POTASSIUM SERPL-SCNC: 4.3 MMOL/L (ref 3.5–5.1)
PROT CSF-MCNC: 30.1 MG/DL (ref 15–45)
PROT SERPL-MCNC: 6.1 GM/DL (ref 5.8–7.6)
RBC # BLD AUTO: 4.91 X10(6)/MCL (ref 4.7–6.1)
RBC # CSF MANUAL: 1000 /UL
S PNEUM DNA CSF QL NAA+NON-PROBE: NOT DETECTED
SAMPLE SITE BLOOD GAS (OHS): ABNORMAL
SAO2 % BLDA: 99.1 %
SODIUM BLOOD GAS (OHS): 128 MMOL/L (ref 137–145)
SODIUM SERPL-SCNC: 135 MMOL/L (ref 136–145)
SPECIMEN VOL CSF: 2 ML
T PALLIDUM AB SER QL: NONREACTIVE
VZV DNA CSF QL NAA+NON-PROBE: NOT DETECTED
WBC # CSF MANUAL: 2 /UL (ref 0–5)
WBC # SPEC AUTO: 6.2 X10(3)/MCL (ref 4.5–11.5)

## 2023-10-04 PROCEDURE — 85025 COMPLETE CBC W/AUTO DIFF WBC: CPT | Performed by: INTERNAL MEDICINE

## 2023-10-04 PROCEDURE — 11000001 HC ACUTE MED/SURG PRIVATE ROOM

## 2023-10-04 PROCEDURE — 36600 WITHDRAWAL OF ARTERIAL BLOOD: CPT

## 2023-10-04 PROCEDURE — 95819 EEG AWAKE AND ASLEEP: CPT

## 2023-10-04 PROCEDURE — 86255 FLUORESCENT ANTIBODY SCREEN: CPT | Performed by: INTERNAL MEDICINE

## 2023-10-04 PROCEDURE — 80053 COMPREHEN METABOLIC PANEL: CPT | Performed by: INTERNAL MEDICINE

## 2023-10-04 PROCEDURE — 87483 CNS DNA AMP PROBE TYPE 12-25: CPT | Performed by: INTERNAL MEDICINE

## 2023-10-04 PROCEDURE — 86780 TREPONEMA PALLIDUM: CPT | Performed by: INTERNAL MEDICINE

## 2023-10-04 PROCEDURE — 25000003 PHARM REV CODE 250: Performed by: INTERNAL MEDICINE

## 2023-10-04 PROCEDURE — 63600175 PHARM REV CODE 636 W HCPCS: Performed by: NURSE PRACTITIONER

## 2023-10-04 PROCEDURE — 99900035 HC TECH TIME PER 15 MIN (STAT)

## 2023-10-04 PROCEDURE — 82140 ASSAY OF AMMONIA: CPT | Performed by: NURSE PRACTITIONER

## 2023-10-04 PROCEDURE — 82803 BLOOD GASES ANY COMBINATION: CPT

## 2023-10-04 PROCEDURE — 21400001 HC TELEMETRY ROOM

## 2023-10-04 PROCEDURE — 82300 ASSAY OF CADMIUM: CPT | Performed by: INTERNAL MEDICINE

## 2023-10-04 PROCEDURE — 25000003 PHARM REV CODE 250: Performed by: NURSE PRACTITIONER

## 2023-10-04 PROCEDURE — 82746 ASSAY OF FOLIC ACID SERUM: CPT | Performed by: INTERNAL MEDICINE

## 2023-10-04 PROCEDURE — 80100016 HC MAINTENANCE HEMODIALYSIS

## 2023-10-04 PROCEDURE — 84157 ASSAY OF PROTEIN OTHER: CPT | Performed by: INTERNAL MEDICINE

## 2023-10-04 PROCEDURE — 25000003 PHARM REV CODE 250: Performed by: RADIOLOGY

## 2023-10-04 PROCEDURE — 89051 BODY FLUID CELL COUNT: CPT | Performed by: INTERNAL MEDICINE

## 2023-10-04 PROCEDURE — 87070 CULTURE OTHR SPECIMN AEROBIC: CPT | Performed by: INTERNAL MEDICINE

## 2023-10-04 PROCEDURE — 82945 GLUCOSE OTHER FLUID: CPT | Performed by: INTERNAL MEDICINE

## 2023-10-04 PROCEDURE — 63600175 PHARM REV CODE 636 W HCPCS: Performed by: RADIOLOGY

## 2023-10-04 RX ORDER — LABETALOL HYDROCHLORIDE 5 MG/ML
INJECTION, SOLUTION INTRAVENOUS
Status: DISPENSED
Start: 2023-10-04 | End: 2023-10-04

## 2023-10-04 RX ORDER — FENTANYL CITRATE 50 UG/ML
INJECTION, SOLUTION INTRAMUSCULAR; INTRAVENOUS
Status: COMPLETED | OUTPATIENT
Start: 2023-10-04 | End: 2023-10-04

## 2023-10-04 RX ORDER — MIDAZOLAM HYDROCHLORIDE 1 MG/ML
INJECTION INTRAMUSCULAR; INTRAVENOUS
Status: COMPLETED | OUTPATIENT
Start: 2023-10-04 | End: 2023-10-04

## 2023-10-04 RX ORDER — VALSARTAN 80 MG/1
80 TABLET ORAL DAILY
Status: DISCONTINUED | OUTPATIENT
Start: 2023-10-04 | End: 2023-10-06 | Stop reason: HOSPADM

## 2023-10-04 RX ORDER — HYDRALAZINE HYDROCHLORIDE 20 MG/ML
INJECTION INTRAMUSCULAR; INTRAVENOUS
Status: DISCONTINUED
Start: 2023-10-04 | End: 2023-10-04 | Stop reason: WASHOUT

## 2023-10-04 RX ORDER — LIDOCAINE HYDROCHLORIDE 20 MG/ML
INJECTION, SOLUTION INFILTRATION; PERINEURAL
Status: COMPLETED | OUTPATIENT
Start: 2023-10-04 | End: 2023-10-04

## 2023-10-04 RX ORDER — FENTANYL CITRATE 50 UG/ML
INJECTION, SOLUTION INTRAMUSCULAR; INTRAVENOUS
Status: DISPENSED
Start: 2023-10-04 | End: 2023-10-04

## 2023-10-04 RX ORDER — MIDAZOLAM HYDROCHLORIDE 1 MG/ML
INJECTION INTRAMUSCULAR; INTRAVENOUS
Status: DISPENSED
Start: 2023-10-04 | End: 2023-10-04

## 2023-10-04 RX ORDER — ERGOCALCIFEROL 1.25 MG/1
50000 CAPSULE ORAL
Status: DISCONTINUED | OUTPATIENT
Start: 2023-10-05 | End: 2023-10-06 | Stop reason: HOSPADM

## 2023-10-04 RX ADMIN — VALSARTAN 80 MG: 80 TABLET, FILM COATED ORAL at 08:10

## 2023-10-04 RX ADMIN — HYDRALAZINE HYDROCHLORIDE 100 MG: 50 TABLET, FILM COATED ORAL at 08:10

## 2023-10-04 RX ADMIN — FENTANYL CITRATE 25 MCG: 50 INJECTION, SOLUTION INTRAMUSCULAR; INTRAVENOUS at 09:10

## 2023-10-04 RX ADMIN — HYDRALAZINE HYDROCHLORIDE 100 MG: 50 TABLET, FILM COATED ORAL at 06:10

## 2023-10-04 RX ADMIN — LIDOCAINE HYDROCHLORIDE 5 ML: 20 INJECTION, SOLUTION INFILTRATION; PERINEURAL at 09:10

## 2023-10-04 RX ADMIN — CARVEDILOL 25 MG: 12.5 TABLET, FILM COATED ORAL at 08:10

## 2023-10-04 RX ADMIN — ISOSORBIDE DINITRATE 20 MG: 20 TABLET ORAL at 08:10

## 2023-10-04 RX ADMIN — THERA TABS 1 TABLET: TAB at 08:10

## 2023-10-04 RX ADMIN — HYDRALAZINE HYDROCHLORIDE 100 MG: 50 TABLET, FILM COATED ORAL at 04:10

## 2023-10-04 RX ADMIN — AMLODIPINE BESYLATE 10 MG: 5 TABLET ORAL at 08:10

## 2023-10-04 RX ADMIN — SEVELAMER CARBONATE 800 MG: 800 TABLET, FILM COATED ORAL at 04:10

## 2023-10-04 RX ADMIN — MIDAZOLAM HYDROCHLORIDE 0.5 MG: 1 INJECTION, SOLUTION INTRAMUSCULAR; INTRAVENOUS at 09:10

## 2023-10-04 RX ADMIN — ISOSORBIDE DINITRATE 20 MG: 20 TABLET ORAL at 04:10

## 2023-10-04 NOTE — CONSULTS
[facility]                       [address]                      [place] [address]    PATIENT NAME:       SANAZ MCCULLOUGH   YOB: 1960  CSN:                [id number]   MRN:                [id number]  ADMIT DATE:         10/01/2023 01:51:00  PHYSICIAN:          Chad Pineda MD                            CONSULTATION    DATE OF CONSULT:      HISTORY OF PRESENT ILLNESS:  The patient is 63-year-old.  I was consulted   because the patient has some confusion and disorientation.  The patient's   English is not the best and the family who was within the family member, he does   not speak English very well either and does not know the history also.    Unfortunately, I do not know the history .  The reason for the consultation   is change in mental status and confusion.  The patient also was found to have   end-stage renal disease and supposed to start on dialysis.  MRI of the brain   showed the patient has chronic ischemic brain disease, some atrophy.  The   patient denied having any localized weakness or localized numbness.  Denied   having any speech problems.    PAST MEDICAL HISTORY:  Reviewed.    FAMILY HISTORY:  Reviewed.    SOCIAL HISTORY:  Reviewed.    MEDICATIONS:  Reviewed.    PHYSICAL EXAMINATION:  NEUROLOGIC:  The patient is alert, awake, oriented x2.  Again, his English is   not the best, so that will limit my history and my physical examination, but I   tried to do my best.  He is moving all extremities.  Cranial nerve examination 2   through 12 is intact.  Gait was not tested.  Finger-to-nose, the patient was   not able to do it.  He did not understand .  Babinski sign is negative.    ASSESSMENT AND PLAN:  Change in mental status, encephalopathy, most likely   metabolic toxic, rule out infection.  MRI of the brain did not show any vascular   insult to the brain such as stroke.  Recommendation to do an EEG to rule out   any  subclinical seizure and if needed to do a spinal tap if the patient is   suspected.  We will follow the patient with you.        ______________________________  MD YULIANA Freed/LACY  DD:  10/03/2023  Time:  06:54PM  DT:  10/03/2023  Time:  10:17PM  Job #:  [id number]      CONSULTATION

## 2023-10-04 NOTE — OP NOTE
Radiology Post-Procedure Note    Pre Op Diagnosis: Renal Dysfunction    Post Op Diagnosis: As Above    Procedure:  biopsy    Procedure performed by: Vincenzo Winn M.D.    Written Informed Consent Obtained: Yes    Specimen Removed: YES     Estimated Blood Loss: Minimal    Findings:   Standard CT Non targeted renal biopsy    Patient tolerated procedure well.    Vincenzo Winn MD

## 2023-10-04 NOTE — PROGRESS NOTES
Progress Note  Nephrology    Admit Date: 10/1/2023   LOS: 3 days     SUBJECTIVE:     Follow-up For:  ESRD    Interval History:  64 Y/O male with history of ESRD , dialyzing in [facility] , fell in parking lot and sustained laceration of head went back to ER and stitched and referred to this hospital for eval   Currently dialyzing on MWF through a tunneled cath in Right upper chest   Had dialysis today and 1.5 liter of fluid removed   Also had spinal tap     Review of Systems:  Constitutional: No fever or chills  Respiratory: No cough or shortness of breath  Cardiovascular: No chest pain or palpitations  Gastrointestinal: No nausea or vomiting  Neurological: No confusion or weakness  No dysuria or frequency reported     OBJECTIVE:     Vital Signs Range (Last 24H):  Vitals:    10/04/23 1618   BP: (!) 163/95   Pulse: 67   Resp: 18   Temp: 98.3 °F (36.8 °C)       Temp:  [97.2 °F (36.2 °C)-98.3 °F (36.8 °C)]   Pulse:  [58-70]   Resp:  [12-20]   BP: (102-175)/(71-96)   SpO2:  [94 %-100 %]     I & O (Last 24H):  Intake/Output Summary (Last 24 hours) at 10/4/2023 1648  Last data filed at 10/4/2023 1618  Gross per 24 hour   Intake 20 ml   Output 2000 ml   Net -1980 ml       Physical Exam:  Alert , oriented   Head   normal   Neck   supple   Chest   symmetric   Lungs   clear   Heart   RRR  Abdomen   soft , non tender   Ext   no edema     Laboratory Data:    Recent Labs   Lab 10/03/23  0420 10/04/23  0515   * 135*   K 4.2 4.3   CO2 23 23   BUN 23.9 41.8*   CREATININE 6.38* 8.53*   GLUCOSE 92 78*   CALCIUM 8.5* 8.8   PHOS 5.6*  --      Lab Results   Component Value Date    .5 (H) 09/22/2023    CALCIUM 8.8 10/04/2023    CAION 1.10 (L) 10/04/2023    PHOS 5.6 (H) 10/03/2023     Lab Results   Component Value Date    IRON 63 (L) 09/11/2023    TIBC 197 (L) 09/11/2023    FERRITIN 487.55 (H) 09/11/2023       Medications:  Medication list was reviewed and changes noted under Assessment/Plan.    Diagnostic Results:    Reviewed most  recent CT/US/Echo/MRI    ASSESSMENT/PLAN:   1   ESRD  2   Syncope  3   HTN  4   Altered mental status  5   SHPT  6   CAD      Plan   labs in AM

## 2023-10-04 NOTE — NURSING
10/04/23 1618        Hemodialysis Catheter right subclavian   No placement date or time found.   Present Prior to Hospital Arrival?: Yes  Hand Hygiene: Performed  Location: right subclavian   Line Necessity Review CRRT/HD   Site Assessment No drainage;No redness;No swelling;No warmth   Line Securement Device Secured with sutures   Dressing Type CHG impregnated dressing/sponge   Dressing Status Clean;Dry;Intact   Dressing Intervention Sterile dressing change   Date on Dressing 10/04/23   Dressing Due to be Changed 10/11/23   Venous Patency/Care normal saline locked   Arterial Patency/Care normal saline locked   Post-Hemodialysis Assessment   Blood Volume Processed (Liters) 45 L   Dialyzer Clearance Moderately streaked   Duration of Treatment 180 minutes   Total UF (mL) 2000 mL   Net Fluid Removal 1500   Patient Response to Treatment Tolerated well.  Vitals stable for duration of tx.  Net uf of 1.5 liters.   Post-Hemodialysis Comments Deaccessed per p and p.  Sterile dressing change done.  Clearguards applied.

## 2023-10-04 NOTE — PROGRESS NOTES
[facility] - 5th Floor Med Surg  [facility] MEDICINE ~ PROGRESS NOTE        CHIEF COMPLAINT   Hospital follow up    HOSPITAL COURSE   63 y.o. male with a history that includes ESRD on HD; presents to the [facility] ED on 10/1/2023  with mental status changes and weakness. He reported ghaving HD on 9/30 at [facility].  It was reported he had N/V and after treatment fell, hit his head on the concrete outside of the hospital after discharge.  He was brought back to the ED at [facility] where he had a right parietal laceration that was repaired; he was the discharged home.   Patient reported mental status changes and confusion since then. Evaluation in ED noted patient HDS, stable on room air. Workup included negative CT head and MRI brain.  BUN was 42, Cr 8.8.  Patient admitted to hospital medicine services, Nephrology consulted.    I spoke with Breanna SUBRAMANIAN nephrology from [facility] who is familiar with the patient and there was some issue about his memory even before dialysis as he kept repeating the same questions over and over every time they saw him.  Requested kidney biopsy which was performed here October 4th.  Neurology was also consulted for intermittent encephalopathy ongoing for about 1 month.  EEG ordered and lumbar puncture performed October 4th.    Today  Seen by Neurology yesterday but nothing new recommended.  EEG was already ordered, lumbar puncture ordered this morning by me.  Mental status seems to be doing better, patient son was at the bedside who agrees that he is doing better today.  He did not remember me from yesterday        OBJECTIVE/PHYSICAL EXAM     VITAL SIGNS (MOST RECENT):  Temp: 97.2 °F (36.2 °C) (10/04/23 0707)  Pulse: (!) 58 (10/04/23 0934)  Resp: 20 (10/04/23 0934)  BP: 102/71 (10/04/23 0934)  SpO2: 98 % (10/04/23 0934) VITAL SIGNS (24 HOUR RANGE):  Temp:  [97.2 °F (36.2 °C)-98 °F (36.7 °C)] 97.2 °F (36.2 °C)  Pulse:  [58-70] 58  Resp:  [12-20] 20  SpO2:  [92 %-100 %] 98 %  BP:  (102175)/(69-96) 102/71   GENERAL: In no acute distress, afebrile  HEENT:  CHEST: Clear to auscultation bilaterally  HEART: S1, S2, no appreciable murmur  ABDOMEN: Soft, nontender, BS +  MSK: Warm, no lower extremity edema, no clubbing or cyanosis  NEUROLOGIC:  Alert and responding appropriately  used  INTEGUMENTARY:  PSYCHIATRY:        ASSESSMENT/PLAN   Encephalopathy x1 month predates starting dialysis  Acute kidney injury versus ESRD requiring HD  Nephrotic range proteinuria  Fall with head trauma    History of: Recently started on HD, hypertension      Nephrology following.  Spoke with Eleanor Carlos and requested.  Unclear why renal failure.  Performed October 4th.  Neurology following.  Waiting for any further recommendations.  Pending EEG, lumbar puncture performed today.  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  Heavy metal blood work ordered as well.    Patient states he has a social security but not citizenship.  His wife is in [place], he is here for work as a [employer] .  Son is present here with him.  He dialyzes at [facility].     DVT prophylaxis:     Anticipated discharge and disposition:   __________________________________________________________________________    LABS/MICRO/MEDS/DIAGNOSTICS       LABS  Recent Labs     10/04/23  0515   *   K 4.3   CHLORIDE 97*   CO2 23   BUN 41.8*   CREATININE 8.53*   GLUCOSE 78*   CALCIUM 8.8   ALKPHOS 58   AST 10   ALT 11   ALBUMIN 3.1*       Recent Labs     10/04/23  0515   WBC 6.20   RBC 4.91   HCT 37.4*   MCV 76.2*            MICROBIOLOGY  Microbiology Results (last 7 days)       Procedure Component Value Units Date/Time    Cerebrospinal Fluid Culture [[id number]] Collected: 10/04/23 1035    Order Status: Sent Specimen: CSF (Spinal Fluid) Updated: 10/04/23 1123               MEDICATIONS   amLODIPine  10 mg Oral QHS    atorvastatin  40 mg Oral Daily    calcitRIOL  0.25 mcg Oral Every other day    carvediloL  25 mg Oral BID    fentaNYL         hydrALAZINE  100 mg Oral Q8H    isosorbide dinitrate  20 mg Oral TID    labetaloL        midazolam        multivitamin  1 tablet Oral BID    sevelamer carbonate  800 mg Oral TID WM    valsartan  80 mg Oral Daily         INFUSIONS         DIAGNOSTIC TESTS  FL Lumbar Puncture Therapeutic With Imaging   Final Result      Technically successful fluoroscopic guided lumbar puncture.         Electronically signed by: Marlene Pa   Date:    10/04/2023   Time:    11:31      X-Ray Chest 1 View   Final Result      No acute abnormality of the chest.         Electronically signed by: Marlene Pa   Date:    10/04/2023   Time:    08:06      X-Ray Chest 1 View   Final Result      No acute cardiopulmonary process.  Interval resolution of right lower lobe opacification.         Electronically signed by: Vincenzo Winn   Date:    10/01/2023   Time:    08:16      MRI Brain Without Contrast   Final Result      CT Head Without Contrast   Final Result      No acute intracranial abnormality identified.  Findings of chronic microvascular ischemic disease.         Electronically signed by: Vincenzo Winn   Date:    10/01/2023   Time:    08:05      CT Biopsy Kidney (xpd)    (Results Pending)        Nuc Stress EF   Date Value Ref Range Status   09/13/2023 55 % Final     Nuc Rest EF   Date Value Ref Range Status   09/13/2023 61  Final          NUTRITION STATUS  Patient meets ASPEN criteria for   malnutrition of   per RD assessment as evidenced by:                       A minimum of two characteristics is recommended for diagnosis of either severe or non-severe malnutrition.       Case related differential diagnoses have been reviewed; assessment and plan has been documented. I have personally reviewed the labs and test results that are currently available; I have reviewed the patients medication list. I have reviewed the consulting providers recommendations. I have reviewed or attempted to review medical records based upon their  availability.  All of the patient's and/or family's questions have been addressed and answered to the best of my ability.  I will continue to monitor closely and make adjustments to medical management as needed.  This document was created using M*Modal Fluency Direct.  Transcription errors may have been made.  Please contact me if any questions may rise regarding documentation to clarify transcription.        Kuldeep Gupta MD   Internal Medicine  Department of Hospital Medicine  [facility] - 5th Floor Med Surg

## 2023-10-04 NOTE — PLAN OF CARE
Problem: Adult Inpatient Plan of Care  Goal: Plan of Care Review  Outcome: Ongoing, Progressing  Goal: Patient-Specific Goal (Individualized)  Outcome: Ongoing, Progressing  Goal: Absence of Hospital-Acquired Illness or Injury  Outcome: Ongoing, Progressing  Goal: Optimal Comfort and Wellbeing  Outcome: Ongoing, Progressing  Goal: Readiness for Transition of Care  Outcome: Ongoing, Progressing     Problem: Device-Related Complication Risk (Hemodialysis)  Goal: Safe, Effective Therapy Delivery  Outcome: Ongoing, Progressing     Problem: Hemodynamic Instability (Hemodialysis)  Goal: Effective Tissue Perfusion  Outcome: Ongoing, Progressing     Problem: Infection (Hemodialysis)  Goal: Absence of Infection Signs and Symptoms  Outcome: Ongoing, Progressing     Problem: Infection  Goal: Absence of Infection Signs and Symptoms  Outcome: Ongoing, Progressing     Problem: Fluid and Electrolyte Imbalance (Acute Kidney Injury/Impairment)  Goal: Fluid and Electrolyte Balance  Outcome: Ongoing, Progressing     Problem: Oral Intake Inadequate (Acute Kidney Injury/Impairment)  Goal: Optimal Nutrition Intake  Outcome: Ongoing, Progressing     Problem: Renal Function Impairment (Acute Kidney Injury/Impairment)  Goal: Effective Renal Function  Outcome: Ongoing, Progressing     Problem: Skin Injury Risk Increased  Goal: Skin Health and Integrity  Outcome: Ongoing, Progressing

## 2023-10-05 LAB
ADDRESS: NORMAL
ALBUMIN SERPL-MCNC: 2.8 G/DL (ref 3.4–4.8)
ARSENIC BLD-MCNC: 2 NG/ML
ATTENDING PHYSICIAN NAME: NORMAL
BASOPHILS # BLD AUTO: 0.08 X10(3)/MCL
BASOPHILS NFR BLD AUTO: 1.2 %
BUN SERPL-MCNC: 34 MG/DL (ref 8.4–25.7)
CADMIUM BLD-MCNC: 0.6 NG/ML
CALCIUM SERPL-MCNC: 8.4 MG/DL (ref 8.8–10)
CHLORIDE SERPL-SCNC: 100 MMOL/L (ref 98–107)
CO2 SERPL-SCNC: 21 MMOL/L (ref 23–31)
COUNTY OF RESIDENCE: NORMAL
CREAT SERPL-MCNC: 6.46 MG/DL (ref 0.73–1.18)
EMPLOYER NAME: NORMAL
EOSINOPHIL # BLD AUTO: 0.48 X10(3)/MCL (ref 0–0.9)
EOSINOPHIL NFR BLD AUTO: 7.5 %
ERYTHROCYTE [DISTWIDTH] IN BLOOD BY AUTOMATED COUNT: 14.5 % (ref 11.5–17)
FACILITY PHONE #: NORMAL
GFR SERPLBLD CREATININE-BSD FMLA CKD-EPI: 9 MLS/MIN/1.73/M2
GLUCOSE SERPL-MCNC: 75 MG/DL (ref 82–115)
HCT VFR BLD AUTO: 35.7 % (ref 42–52)
HGB BLD-MCNC: 11.5 G/DL (ref 14–18)
HX OF OCCUPATION: NORMAL
IMM GRANULOCYTES # BLD AUTO: 0.02 X10(3)/MCL (ref 0–0.04)
IMM GRANULOCYTES NFR BLD AUTO: 0.3 %
LEAD BLDV-MCNC: 1.8 MCG/DL
LYMPHOCYTES # BLD AUTO: 1.01 X10(3)/MCL (ref 0.6–4.6)
LYMPHOCYTES NFR BLD AUTO: 15.7 %
M HEALTH CARE PROVIDER PHONE: NORMAL
M PATIENT CITY: NORMAL
MCH RBC QN AUTO: 24.7 PG (ref 27–31)
MCHC RBC AUTO-ENTMCNC: 32.2 G/DL (ref 33–36)
MCV RBC AUTO: 76.8 FL (ref 80–94)
MERCURY BLD-MCNC: 2 NG/ML
MONOCYTES # BLD AUTO: 0.68 X10(3)/MCL (ref 0.1–1.3)
MONOCYTES NFR BLD AUTO: 10.6 %
NEUTROPHILS # BLD AUTO: 4.16 X10(3)/MCL (ref 2.1–9.2)
NEUTROPHILS NFR BLD AUTO: 64.7 %
NRBC BLD AUTO-RTO: 0 %
PHONE #: NORMAL
PHOSPHATE SERPL-MCNC: 6.5 MG/DL (ref 2.3–4.7)
PLATELET # BLD AUTO: 194 X10(3)/MCL (ref 130–400)
PMV BLD AUTO: 9.1 FL (ref 7.4–10.4)
POSTAL CODE: NORMAL
POTASSIUM SERPL-SCNC: 4.3 MMOL/L (ref 3.5–5.1)
PROVIDER CITY: NORMAL
PROVIDER POSTAL CODE: NORMAL
PROVIDER STATE: NORMAL
PTH-INTACT SERPL-MCNC: 233.7 PG/ML (ref 8.7–77)
RBC # BLD AUTO: 4.65 X10(6)/MCL (ref 4.7–6.1)
REFER PHYSICIAN ADDR: NORMAL
SODIUM SERPL-SCNC: 135 MMOL/L (ref 136–145)
SPECIMEN SOURCE: NORMAL
STATE OF RESIDENCE: NORMAL
WBC # SPEC AUTO: 6.43 X10(3)/MCL (ref 4.5–11.5)

## 2023-10-05 PROCEDURE — 11000001 HC ACUTE MED/SURG PRIVATE ROOM

## 2023-10-05 PROCEDURE — 21400001 HC TELEMETRY ROOM

## 2023-10-05 PROCEDURE — 85025 COMPLETE CBC W/AUTO DIFF WBC: CPT | Performed by: INTERNAL MEDICINE

## 2023-10-05 PROCEDURE — 80069 RENAL FUNCTION PANEL: CPT | Performed by: INTERNAL MEDICINE

## 2023-10-05 PROCEDURE — 25000003 PHARM REV CODE 250: Performed by: INTERNAL MEDICINE

## 2023-10-05 PROCEDURE — 83970 ASSAY OF PARATHORMONE: CPT | Performed by: INTERNAL MEDICINE

## 2023-10-05 PROCEDURE — 25000003 PHARM REV CODE 250: Performed by: NURSE PRACTITIONER

## 2023-10-05 RX ORDER — HYDRALAZINE HYDROCHLORIDE 100 MG/1
100 TABLET, FILM COATED ORAL 3 TIMES DAILY
Qty: 90 TABLET | Refills: 0 | Status: SHIPPED | OUTPATIENT
Start: 2023-10-05 | End: 2023-10-06 | Stop reason: SDUPTHER

## 2023-10-05 RX ORDER — LOSARTAN POTASSIUM 50 MG/1
50 TABLET ORAL DAILY
Qty: 30 TABLET | Refills: 0 | Status: SHIPPED | OUTPATIENT
Start: 2023-10-05 | End: 2023-10-06 | Stop reason: SDUPTHER

## 2023-10-05 RX ORDER — CALCITRIOL 0.25 UG/1
0.25 CAPSULE ORAL DAILY
Qty: 90 CAPSULE | Refills: 3
Start: 2023-10-05 | End: 2023-10-06 | Stop reason: SDUPTHER

## 2023-10-05 RX ADMIN — SEVELAMER CARBONATE 800 MG: 800 TABLET, FILM COATED ORAL at 05:10

## 2023-10-05 RX ADMIN — HYDRALAZINE HYDROCHLORIDE 100 MG: 50 TABLET, FILM COATED ORAL at 08:10

## 2023-10-05 RX ADMIN — THERA TABS 1 TABLET: TAB at 08:10

## 2023-10-05 RX ADMIN — CARVEDILOL 25 MG: 12.5 TABLET, FILM COATED ORAL at 08:10

## 2023-10-05 RX ADMIN — AMLODIPINE BESYLATE 10 MG: 5 TABLET ORAL at 08:10

## 2023-10-05 RX ADMIN — SEVELAMER CARBONATE 800 MG: 800 TABLET, FILM COATED ORAL at 12:10

## 2023-10-05 RX ADMIN — VALSARTAN 80 MG: 80 TABLET, FILM COATED ORAL at 09:10

## 2023-10-05 RX ADMIN — CARVEDILOL 25 MG: 12.5 TABLET, FILM COATED ORAL at 09:10

## 2023-10-05 RX ADMIN — ERGOCALCIFEROL 50000 UNITS: 1.25 CAPSULE ORAL at 09:10

## 2023-10-05 RX ADMIN — HYDRALAZINE HYDROCHLORIDE 100 MG: 50 TABLET, FILM COATED ORAL at 05:10

## 2023-10-05 RX ADMIN — ATORVASTATIN CALCIUM 40 MG: 40 TABLET, FILM COATED ORAL at 09:10

## 2023-10-05 RX ADMIN — ISOSORBIDE DINITRATE 20 MG: 20 TABLET ORAL at 08:10

## 2023-10-05 RX ADMIN — THERA TABS 1 TABLET: TAB at 09:10

## 2023-10-05 RX ADMIN — ISOSORBIDE DINITRATE 20 MG: 20 TABLET ORAL at 09:10

## 2023-10-05 NOTE — DISCHARGE SUMMARY
[facility] - 5th Floor Med Surg  hospitals MEDICINE - DISCHARGE SUMMARY    Patient Name: Ryann Ly  MRN: [id number]  Admission Date: 10/1/2023  Discharge Date: 10/05/2023  Hospital Length of Stay: 4 days  Discharge Provider: Kuldeep Gupta MD  Primary Care Provider: Deb, Primary Doctor      HOSPITAL COURSE   63 y.o. male with a history that includes ESRD on HD; presents to the [facility] ED on 10/1/2023  with mental status changes and weakness. He reported ghaving HD on 9/30 at [facility].  It was reported he had N/V and after treatment fell, hit his head on the concrete outside of the hospital after discharge.  He was brought back to the ED at [facility] where he had a right parietal laceration that was repaired; he was the discharged home.   Patient reported mental status changes and confusion since then. Evaluation in ED noted patient HDS, stable on room air. Workup included negative CT head and MRI brain.  BUN was 42, Cr 8.8.  Patient admitted to hospital medicine services, Nephrology consulted.     I spoke with Breanna SUBRAMANIAN nephrology from [facility] who is familiar with the patient and there was some issue about his memory even before dialysis as he kept repeating the same questions over and over every time they saw him.  Requested kidney biopsy which was performed here October 4th.  Neurology was also consulted for intermittent encephalopathy ongoing for about 1 month.  EEG ordered and lumbar puncture performed October 4th.  CSF was negative for infection, RBC 1000, WBC 2 79% lymphocyte, glucose 56, protein 30, meningoencephalitis panel negative.  Syphilis negative, B12 high, folate normal.  EEG showed moderate to severe cerebral dysfunction nonspecific sign but no epileptiform discharges.    Otherwise this morning and yesterday he has cleared up and no signs of confusion.  Spoke to his wife this morning as well and she plans to transfer him back to [place] with her.  Discussed that he will need to continue  getting dialysis until cleared by Nephrology.  He is to go to the ER at [facility] and get evaluated twice weekly maybe Monday and Thursday.  Patient understood that due to his no insurance he is unable to be placed into a dialysis unit wife understood this as well.  Son was present in the room.    Will need follow-up on kidney biopsy        PHYSICAL EXAM     Most Recent Vital Signs:  Temp: 98.1 °F (36.7 °C) (10/05/23 0400)  Pulse: 61 (10/05/23 0400)  Resp: 18 (10/05/23 0400)  BP: (!) 144/73 (10/05/23 0400)  SpO2: 96 % (10/05/23 0400)   GENERAL: In no acute distress, afebrile  HEENT:  CHEST: Clear to auscultation bilaterally  HEART: S1, S2, no appreciable murmur  ABDOMEN: Soft, nontender, BS +  MSK: Warm, no lower extremity edema, no clubbing or cyanosis  NEUROLOGIC:  Alert and responding appropriately  used          DISCHARGE DIAGNOSIS   Acute metabolic Encephalopathy x1 month predates starting dialysis  Acute kidney injury versus ESRD requiring HD  Nephrotic range proteinuria  Fall with head trauma     History of: Recently started on HD, hypertension        _____________________________________________________________________________      DISCHARGE MED REC     Current Discharge Medication List        START taking these medications    Details   losartan (COZAAR) 50 MG tablet Take 1 tablet (50 mg total) by mouth once daily.  Qty: 30 tablet, Refills: 0    Comments: .           CONTINUE these medications which have CHANGED    Details   calcitRIOL (ROCALTROL) 0.25 MCG Cap Take 1 capsule (0.25 mcg total) by mouth once daily.  Qty: 90 capsule, Refills: 3      hydrALAZINE (APRESOLINE) 100 MG tablet Take 1 tablet (100 mg total) by mouth 3 (three) times daily.  Qty: 90 tablet, Refills: 0           CONTINUE these medications which have NOT CHANGED    Details   amLODIPine (NORVASC) 10 MG tablet Take 1 tablet (10 mg total) by mouth every evening.  Qty: 90 tablet, Refills: 3    Comments: Please print prescription  instructions in patient native language      atorvastatin (LIPITOR) 40 MG tablet Take 1 tablet (40 mg total) by mouth once daily.  Qty: 90 tablet, Refills: 3    Comments: Please print prescription instructions in patient native language      carvediloL (COREG) 25 MG tablet Take 1 tablet (25 mg total) by mouth 2 (two) times daily.  Qty: 60 tablet, Refills: 11    Comments: Please print prescription instructions in patient native language      isosorbide dinitrate (ISORDIL) 20 MG tablet Take 1 tablet (20 mg total) by mouth 3 (three) times daily.  Qty: 90 tablet, Refills: 11    Comments: Please print prescription instructions in patient native language      sevelamer carbonate (RENVELA) 800 mg Tab Take 1 tablet (800 mg total) by mouth 3 (three) times daily with meals.  Qty: 90 tablet, Refills: 11    Comments: Please print prescription instructions in patient native language           STOP taking these medications       aspirin 81 MG Chew Comments:   Reason for Stopping:         baclofen (LIORESAL) 10 MG tablet Comments:   Reason for Stopping:         HYDROcodone-acetaminophen (NORCO) 5-325 mg per tablet Comments:   Reason for Stopping:         LIDOcaine (LIDODERM) 5 % Comments:   Reason for Stopping:                  CONSULTS     Consults (From admission, onward)          Status Ordering Provider     Inpatient consult to Radiology  Once        Provider:  (Not yet assigned)    Completed NICOLE ESCOBEDO     Inpatient consult to Neurology  Once        Provider:  Chad Pineda MD    Completed NICOLE ESCOBEDO     Inpatient consult to Interventional Radiology  Once        Provider:  Vincenzo Winn MD    Completed NICOLE ESCOBEDO     Inpatient consult to Nephrology  Once        Provider:  Temo Nunez MD    Completed JESSENIA BUTCHER              FOLLOW UP      Follow-up Information       Brittany Hyman MD Follow up in 1 week(s).    Specialty: Nephrology  Contact information:  [address]  [id number]  [phone]                                 DISCHARGE INSTRUCTIONS     Explained in detail to the patient about the discharge plan, medications, and follow-up visits. Pt understands and agrees with the treatment plan.  Discharged Condition: stable  Diet as tolerated  Activities as tolerated  Discharge to: Home or Self Care    TIME SPENT ON DISCHARGE   35 minutes        Kuldeep Gupta MD  Internal Medicine  Department of [facility] Medicine  [facility] - 5th Floor Med Surg      This document was created using electronic dictation services.  Please excuse any errors that may have been made.  Contact me if any questions regarding documentation to clarify verbiage.

## 2023-10-05 NOTE — NURSING
While at bedside for discharge instructions (using language line to assist) patient and son express concern regarding transportation to hemodialysis Friday and care at home. Wife is arranging for patient to fly to [place] to be with her on Friday after HD. Relayed concerns to MD with ok to stay overnight and discharge after HD on Friday.

## 2023-10-05 NOTE — PROGRESS NOTES
[facility]                       [address] [place] [address]    PATIENT NAME:       SANAZ MCCULLOUGH   YOB: 1960  CSN:                [id number]   MRN:                [id number]  ADMIT DATE:         10/01/2023 01:51:00  PHYSICIAN:          Chad Pineda MD                           TESTING    DATE OF SERVICE:      TYPE OF STUDY:  Electroencephalogram.    REASON FOR STUDY:  For seizure workup and confusion.    DESCRIPTION OF PROCEDURE:  This electroencephalogram was done using 10/20   systems, using 21 channels.  The background is consistent between 5-6 hertz.    There was some intermittent high-voltage delta activity and theta activity   throughout the study.  No clear evidence of any performed discharges.    CONCLUSION:  Abnormal study due to presence of diffuse slowing consistent with   moderate-to-severe cerebral dysfunction which is a nonspecific sign; can be   found toxic, metabolic, anoxic brain injury, or Alzheimer disease.  There is no   clear evidence of any epileptiform discharges.  Clinical correlation suggested.        ______________________________  MD YULIANA Freed/AQS  DD:  10/04/2023  Time:  08:24PM  DT:  10/04/2023  Time:  10:43PM  Job #:  [id number]       TESTING

## 2023-10-05 NOTE — PROGRESS NOTES
[facility]                       [address] [place] [address]    PATIENT NAME:       SANAZ MCCULLOUGH   YOB: 1960  CSN:                [id number]   MRN:                [id number]  ADMIT DATE:         10/01/2023 01:51:00  PHYSICIAN:          Chad Pineda MD                            PROGRESS NOTE    DATE:      He came to the room.  The patient is pleasantly confused.  Apparently, he is   better today than yesterday.  The EEG was done, and I read that the EEG did not   show any evidence of any performed discharges.  EEG is mostly consistent with   moderate-to-severe cerebral dysfunction which is a nonspecific sign; can be   found toxic, metabolic, anoxic brain injury, or Alzheimer disease.  Apparently,   the patient did have some memory problems before this event happened.  In my   opinion, the patient probably could have some dementia at baseline with   superimposed encephalopathy secondary to toxic metabolic, less likely infection.    Spinal tap did not show any evidence of any infection.  The patient's protein   and white count are normal.  Autoimmune disease was sent over and still waiting.    Hopefully, the patient will improve mentally to baseline whatever his baseline   is, once his kidney disease is settled.  If the patient's encephalopathy has   been improving after the kidney disease is settled with dialysis and everything,   we need to know if the patient does have a baseline dementia for further   evaluation that can be done as an outpatient workup.        ______________________________  MD YULIANA Freed/AQS  DD:  10/04/2023  Time:  08:26PM  DT:  10/04/2023  Time:  10:38PM  Job #:  [id number]      PROGRESS NOTE

## 2023-10-05 NOTE — PLAN OF CARE
Problem: Adult Inpatient Plan of Care  Goal: Plan of Care Review  Outcome: Ongoing, Progressing  Goal: Patient-Specific Goal (Individualized)  Outcome: Ongoing, Progressing  Goal: Absence of Hospital-Acquired Illness or Injury  Outcome: Ongoing, Progressing  Goal: Optimal Comfort and Wellbeing  Outcome: Ongoing, Progressing  Goal: Readiness for Transition of Care  Outcome: Ongoing, Progressing     Problem: Device-Related Complication Risk (Hemodialysis)  Goal: Safe, Effective Therapy Delivery  Outcome: Ongoing, Progressing     Problem: Hemodynamic Instability (Hemodialysis)  Goal: Effective Tissue Perfusion  Outcome: Ongoing, Progressing     Problem: Infection (Hemodialysis)  Goal: Absence of Infection Signs and Symptoms  Outcome: Ongoing, Progressing     Problem: Infection  Goal: Absence of Infection Signs and Symptoms  Outcome: Ongoing, Progressing     Problem: Fluid and Electrolyte Imbalance (Acute Kidney Injury/Impairment)  Goal: Fluid and Electrolyte Balance  Outcome: Ongoing, Progressing     Problem: Oral Intake Inadequate (Acute Kidney Injury/Impairment)  Goal: Optimal Nutrition Intake  Outcome: Ongoing, Progressing

## 2023-10-05 NOTE — DISCHARGE INSTRUCTIONS
Continue Hemodialysis Monday, Wednesday, Friday. Please report to HD Clinic for next session Friday 10/6/23.

## 2023-10-06 VITALS
HEIGHT: 62 IN | WEIGHT: 110 LBS | BODY MASS INDEX: 20.24 KG/M2 | OXYGEN SATURATION: 96 % | HEART RATE: 72 BPM | DIASTOLIC BLOOD PRESSURE: 86 MMHG | RESPIRATION RATE: 18 BRPM | SYSTOLIC BLOOD PRESSURE: 163 MMHG | TEMPERATURE: 99 F

## 2023-10-06 LAB — PSYCHE PATHOLOGY RESULT: NORMAL

## 2023-10-06 PROCEDURE — 25000003 PHARM REV CODE 250: Performed by: INTERNAL MEDICINE

## 2023-10-06 PROCEDURE — 80100016 HC MAINTENANCE HEMODIALYSIS

## 2023-10-06 PROCEDURE — 25000003 PHARM REV CODE 250: Performed by: NURSE PRACTITIONER

## 2023-10-06 RX ORDER — CALCITRIOL 0.25 UG/1
0.25 CAPSULE ORAL DAILY
Qty: 90 CAPSULE | Refills: 3 | Status: SHIPPED | OUTPATIENT
Start: 2023-10-06

## 2023-10-06 RX ORDER — HYDRALAZINE HYDROCHLORIDE 100 MG/1
100 TABLET, FILM COATED ORAL 3 TIMES DAILY
Qty: 90 TABLET | Refills: 0 | Status: SHIPPED | OUTPATIENT
Start: 2023-10-06

## 2023-10-06 RX ORDER — LOSARTAN POTASSIUM 50 MG/1
50 TABLET ORAL DAILY
Qty: 30 TABLET | Refills: 0 | Status: SHIPPED | OUTPATIENT
Start: 2023-10-06

## 2023-10-06 RX ADMIN — ATORVASTATIN CALCIUM 40 MG: 40 TABLET, FILM COATED ORAL at 09:10

## 2023-10-06 RX ADMIN — VALSARTAN 80 MG: 80 TABLET, FILM COATED ORAL at 09:10

## 2023-10-06 RX ADMIN — SEVELAMER CARBONATE 800 MG: 800 TABLET, FILM COATED ORAL at 09:10

## 2023-10-06 RX ADMIN — ISOSORBIDE DINITRATE 20 MG: 20 TABLET ORAL at 09:10

## 2023-10-06 RX ADMIN — CALCITRIOL CAPSULES 0.25 MCG 0.25 MCG: 0.25 CAPSULE ORAL at 09:10

## 2023-10-06 RX ADMIN — HYDRALAZINE HYDROCHLORIDE 50 MG: 50 TABLET, FILM COATED ORAL at 05:10

## 2023-10-06 RX ADMIN — THERA TABS 1 TABLET: TAB at 09:10

## 2023-10-06 RX ADMIN — CARVEDILOL 25 MG: 12.5 TABLET, FILM COATED ORAL at 09:10

## 2023-10-06 NOTE — NURSING
10/06/23 1544   Post-Hemodialysis Assessment   Blood Volume Processed (Liters) 53.1 L   Dialyzer Clearance Lightly streaked   Duration of Treatment 180 minutes   Total UF (mL) 1500 mL   Patient Response to Treatment tolerated well

## 2023-10-06 NOTE — PLAN OF CARE
10/06/23 0825   Final Note   Assessment Type Final Discharge Note   Anticipated Discharge Disposition Home   Post-Acute Status   Discharge Delays None known at this time

## 2023-10-07 LAB — NMDAR IGG TITR CSF IF: NORMAL {TITER}

## 2023-10-09 ENCOUNTER — PATIENT OUTREACH (OUTPATIENT)
Dept: ADMINISTRATIVE | Facility: CLINIC | Age: 63
End: 2023-10-09

## 2023-10-09 ENCOUNTER — HOSPITAL ENCOUNTER (OUTPATIENT)
Facility: HOSPITAL | Age: 63
Discharge: HOME | End: 2023-10-09
Attending: EMERGENCY MEDICINE | Admitting: INTERNAL MEDICINE

## 2023-10-09 VITALS
SYSTOLIC BLOOD PRESSURE: 140 MMHG | WEIGHT: 107.69 LBS | BODY MASS INDEX: 19.82 KG/M2 | TEMPERATURE: 98 F | OXYGEN SATURATION: 100 % | HEART RATE: 60 BPM | RESPIRATION RATE: 18 BRPM | DIASTOLIC BLOOD PRESSURE: 95 MMHG | HEIGHT: 62 IN

## 2023-10-09 DIAGNOSIS — N18.6 ESRD (END STAGE RENAL DISEASE): ICD-10-CM

## 2023-10-09 DIAGNOSIS — Z13.9 SCREENING DUE: ICD-10-CM

## 2023-10-09 DIAGNOSIS — E87.70 HYPERVOLEMIA, UNSPECIFIED HYPERVOLEMIA TYPE: Primary | ICD-10-CM

## 2023-10-09 LAB
ALBUMIN SERPL-MCNC: 3.2 G/DL (ref 3.4–4.8)
ALBUMIN/GLOB SERPL: 0.9 RATIO (ref 1.1–2)
ALP SERPL-CCNC: 56 UNIT/L (ref 40–150)
ALT SERPL-CCNC: 11 UNIT/L (ref 0–55)
AST SERPL-CCNC: 8 UNIT/L (ref 5–34)
BACTERIA CSF CULT: NORMAL
BASOPHILS # BLD AUTO: 0.06 X10(3)/MCL
BASOPHILS NFR BLD AUTO: 0.7 %
BILIRUB SERPL-MCNC: 0.5 MG/DL
BNP BLD-MCNC: 138.9 PG/ML
BUN SERPL-MCNC: 61 MG/DL (ref 8.4–25.7)
CALCIUM SERPL-MCNC: 9 MG/DL (ref 8.8–10)
CHLORIDE SERPL-SCNC: 95 MMOL/L (ref 98–107)
CO2 SERPL-SCNC: 24 MMOL/L (ref 23–31)
CREAT SERPL-MCNC: 8.9 MG/DL (ref 0.73–1.18)
EOSINOPHIL # BLD AUTO: 0.74 X10(3)/MCL (ref 0–0.9)
EOSINOPHIL NFR BLD AUTO: 8.5 %
ERYTHROCYTE [DISTWIDTH] IN BLOOD BY AUTOMATED COUNT: 14.2 % (ref 11.5–17)
GFR SERPLBLD CREATININE-BSD FMLA CKD-EPI: 6 MLS/MIN/1.73/M2
GLOBULIN SER-MCNC: 3.7 GM/DL (ref 2.4–3.5)
GLUCOSE SERPL-MCNC: 130 MG/DL (ref 82–115)
GRAM STN SPEC: NORMAL
HBV SURFACE AG SERPL QL IA: NONREACTIVE
HCT VFR BLD AUTO: 36.9 % (ref 42–52)
HGB BLD-MCNC: 11.7 G/DL (ref 14–18)
IMM GRANULOCYTES # BLD AUTO: 0.05 X10(3)/MCL (ref 0–0.04)
IMM GRANULOCYTES NFR BLD AUTO: 0.6 %
LYMPHOCYTES # BLD AUTO: 1.03 X10(3)/MCL (ref 0.6–4.6)
LYMPHOCYTES NFR BLD AUTO: 11.8 %
MAGNESIUM SERPL-MCNC: 2.5 MG/DL (ref 1.6–2.6)
MCH RBC QN AUTO: 24.4 PG (ref 27–31)
MCHC RBC AUTO-ENTMCNC: 31.7 G/DL (ref 33–36)
MCV RBC AUTO: 76.9 FL (ref 80–94)
MONOCYTES # BLD AUTO: 0.78 X10(3)/MCL (ref 0.1–1.3)
MONOCYTES NFR BLD AUTO: 8.9 %
NEUTROPHILS # BLD AUTO: 6.07 X10(3)/MCL (ref 2.1–9.2)
NEUTROPHILS NFR BLD AUTO: 69.5 %
NRBC BLD AUTO-RTO: 0 %
PLATELET # BLD AUTO: 225 X10(3)/MCL (ref 130–400)
PMV BLD AUTO: 8.6 FL (ref 7.4–10.4)
POTASSIUM SERPL-SCNC: 4.4 MMOL/L (ref 3.5–5.1)
PROT SERPL-MCNC: 6.9 GM/DL (ref 5.8–7.6)
RBC # BLD AUTO: 4.8 X10(6)/MCL (ref 4.7–6.1)
SODIUM SERPL-SCNC: 131 MMOL/L (ref 136–145)
TROPONIN I SERPL-MCNC: 0.04 NG/ML (ref 0–0.04)
WBC # SPEC AUTO: 8.73 X10(3)/MCL (ref 4.5–11.5)

## 2023-10-09 PROCEDURE — 93005 ELECTROCARDIOGRAM TRACING: CPT

## 2023-10-09 PROCEDURE — 85025 COMPLETE CBC W/AUTO DIFF WBC: CPT | Performed by: EMERGENCY MEDICINE

## 2023-10-09 PROCEDURE — G0378 HOSPITAL OBSERVATION PER HR: HCPCS

## 2023-10-09 PROCEDURE — 25000003 PHARM REV CODE 250

## 2023-10-09 PROCEDURE — 83735 ASSAY OF MAGNESIUM: CPT | Performed by: EMERGENCY MEDICINE

## 2023-10-09 PROCEDURE — 83880 ASSAY OF NATRIURETIC PEPTIDE: CPT | Performed by: EMERGENCY MEDICINE

## 2023-10-09 PROCEDURE — 84484 ASSAY OF TROPONIN QUANT: CPT | Performed by: EMERGENCY MEDICINE

## 2023-10-09 PROCEDURE — 99285 EMERGENCY DEPT VISIT HI MDM: CPT | Mod: 25

## 2023-10-09 PROCEDURE — 87340 HEPATITIS B SURFACE AG IA: CPT | Performed by: NURSE PRACTITIONER

## 2023-10-09 PROCEDURE — 80053 COMPREHEN METABOLIC PANEL: CPT | Performed by: EMERGENCY MEDICINE

## 2023-10-09 PROCEDURE — G0257 UNSCHED DIALYSIS ESRD PT HOS: HCPCS

## 2023-10-09 RX ORDER — CALCITRIOL 0.25 UG/1
0.25 CAPSULE ORAL DAILY
Status: DISCONTINUED | OUTPATIENT
Start: 2023-10-09 | End: 2023-10-09 | Stop reason: HOSPADM

## 2023-10-09 RX ORDER — NALOXONE HCL 0.4 MG/ML
0.02 VIAL (ML) INJECTION
Status: DISCONTINUED | OUTPATIENT
Start: 2023-10-09 | End: 2023-10-09 | Stop reason: HOSPADM

## 2023-10-09 RX ORDER — SODIUM CHLORIDE 0.9 % (FLUSH) 0.9 %
10 SYRINGE (ML) INJECTION EVERY 12 HOURS PRN
Status: DISCONTINUED | OUTPATIENT
Start: 2023-10-09 | End: 2023-10-09 | Stop reason: HOSPADM

## 2023-10-09 RX ORDER — ISOSORBIDE DINITRATE 20 MG/1
20 TABLET ORAL 3 TIMES DAILY
Status: DISCONTINUED | OUTPATIENT
Start: 2023-10-09 | End: 2023-10-09 | Stop reason: HOSPADM

## 2023-10-09 RX ORDER — ONDANSETRON 4 MG/1
4 TABLET, ORALLY DISINTEGRATING ORAL EVERY 12 HOURS PRN
Qty: 30 TABLET | Refills: 0 | Status: SHIPPED | OUTPATIENT
Start: 2023-10-09

## 2023-10-09 RX ORDER — ATORVASTATIN CALCIUM 40 MG/1
40 TABLET, FILM COATED ORAL DAILY
Status: DISCONTINUED | OUTPATIENT
Start: 2023-10-09 | End: 2023-10-09 | Stop reason: HOSPADM

## 2023-10-09 RX ORDER — SEVELAMER CARBONATE 800 MG/1
800 TABLET, FILM COATED ORAL
Status: DISCONTINUED | OUTPATIENT
Start: 2023-10-09 | End: 2023-10-09 | Stop reason: HOSPADM

## 2023-10-09 RX ORDER — HYDRALAZINE HYDROCHLORIDE 25 MG/1
100 TABLET, FILM COATED ORAL 3 TIMES DAILY
Status: DISCONTINUED | OUTPATIENT
Start: 2023-10-09 | End: 2023-10-09 | Stop reason: HOSPADM

## 2023-10-09 RX ORDER — IBUPROFEN 200 MG
16 TABLET ORAL
Status: DISCONTINUED | OUTPATIENT
Start: 2023-10-09 | End: 2023-10-09 | Stop reason: HOSPADM

## 2023-10-09 RX ORDER — GLUCAGON 1 MG
1 KIT INJECTION
Status: DISCONTINUED | OUTPATIENT
Start: 2023-10-09 | End: 2023-10-09 | Stop reason: HOSPADM

## 2023-10-09 RX ORDER — LOSARTAN POTASSIUM 25 MG/1
50 TABLET ORAL DAILY
Status: DISCONTINUED | OUTPATIENT
Start: 2023-10-09 | End: 2023-10-09 | Stop reason: HOSPADM

## 2023-10-09 RX ORDER — IBUPROFEN 200 MG
24 TABLET ORAL
Status: DISCONTINUED | OUTPATIENT
Start: 2023-10-09 | End: 2023-10-09 | Stop reason: HOSPADM

## 2023-10-09 RX ORDER — AMLODIPINE BESYLATE 10 MG/1
10 TABLET ORAL NIGHTLY
Status: DISCONTINUED | OUTPATIENT
Start: 2023-10-09 | End: 2023-10-09 | Stop reason: HOSPADM

## 2023-10-09 RX ORDER — CARVEDILOL 12.5 MG/1
25 TABLET ORAL 2 TIMES DAILY
Status: DISCONTINUED | OUTPATIENT
Start: 2023-10-09 | End: 2023-10-09 | Stop reason: HOSPADM

## 2023-10-09 RX ORDER — ONDANSETRON 2 MG/ML
4 INJECTION INTRAMUSCULAR; INTRAVENOUS EVERY 8 HOURS PRN
Status: DISCONTINUED | OUTPATIENT
Start: 2023-10-09 | End: 2023-10-09 | Stop reason: HOSPADM

## 2023-10-09 RX ADMIN — SEVELAMER CARBONATE 800 MG: 800 TABLET, FILM COATED ORAL at 11:10

## 2023-10-09 RX ADMIN — LOSARTAN POTASSIUM 50 MG: 25 TABLET, FILM COATED ORAL at 11:10

## 2023-10-09 RX ADMIN — ATORVASTATIN CALCIUM 40 MG: 40 TABLET, FILM COATED ORAL at 11:10

## 2023-10-09 RX ADMIN — ISOSORBIDE DINITRATE 20 MG: 20 TABLET ORAL at 03:10

## 2023-10-09 RX ADMIN — HYDRALAZINE HYDROCHLORIDE 100 MG: 25 TABLET ORAL at 03:10

## 2023-10-09 RX ADMIN — CALCITRIOL CAPSULES 0.25 MCG 0.25 MCG: 0.25 CAPSULE ORAL at 11:10

## 2023-10-09 RX ADMIN — CARVEDILOL 25 MG: 12.5 TABLET, FILM COATED ORAL at 11:10

## 2023-10-09 NOTE — PROGRESS NOTES
10/09/23 1527   Post-Hemodialysis Assessment   Blood Volume Processed (Liters) 63 L   Dialyzer Clearance Lightly streaked   Duration of Treatment 180 minutes   Total UF (mL) 1000 mL   Patient Response to Treatment Unable to remove ordered UF due to hypotension. Ave SUBRAMANIAN aware. Total tx time was 3 hrs

## 2023-10-09 NOTE — CARE UPDATE
Care Update    I have personally seen and examined patient. Care is necessary and appropriate at this time.  Nephrology consulted for hemodialysis.  Electrolytes within normal limits.  Patient is asymptomatic.  Denies any chest pain, abdominal pain, shortness of breath, fever, nausea, vomiting, diarrhea, and  constipation. EKG read below. Troponin 0.039 wnl.    Patient reported being out of medication.  Presented with elevated blood pressure systolic 170s and diastolic in the 100s.  Home antihypertensive medications restarted. amlodipine 10 nightly, Coreg 25 mg b.i.d., hydralazine 100 mg t.i.d., isosorbide dinitrate 20 mg t.i.d., losartan 50 mg daily.  Zofran p.r.n. for nausea.    Results for orders placed or performed during the hospital encounter of 10/09/23   EKG 12-lead    Collection Time: 10/09/23  8:02 AM    Narrative    Test Reason : Z13.9,    Vent. Rate : 078 BPM     Atrial Rate : 078 BPM     P-R Int : 166 ms          QRS Dur : 142 ms      QT Int : 426 ms       P-R-T Axes : 067 080 -02 degrees     QTc Int : 485 ms    Normal sinus rhythm  Nonspecific intraventricular block  Cannot rule out Anterior infarct ,age undetermined  T wave abnormality, consider inferolateral ischemia  Abnormal ECG  When compared with ECG of 03-OCT-2023 21:18,  T wave inversion less evident in Lateral leads    Referred By: [email or web address]   SELF           Confirmed By:        Of note patient requested a letter stating need for medication. Flying to [place] tomorrow.  Required for TSA.  Letter will be provided.    Disposition:  Patient is currently stable. Possible discharge tomorrow.    Felix Sweeney MD, [facility] Resident, [facility]

## 2023-10-09 NOTE — DISCHARGE SUMMARY
[facility] Discharge Summary    Admitting Physician: Kuldeep Hilliard MD  Attending Physician: Kuldeep Hilliard MD  Date of Admit: 10/9/2023  Date of Discharge: 10/9/2023    Condition: Stable  Outcome: Patient tolerated treatment/procedure well without complication and is now ready for discharge.  DISPOSITION: Home or Self Care      Discharge Diagnoses     Patient Active Problem List   Diagnosis    ARF (acute renal failure)    Hypertensive emergency    ESRD (end stage renal disease)    Encephalopathy acute       Principal Problem:  ESRD (end stage renal disease)    Consultants and Procedures     Consultants:  IP CONSULT TO HOSPITAL MEDICINE  IP CONSULT TO NEPHROLOGY    Procedures:   * No surgery found *      Brief Admission History      Patient presented to ED today with complaints of SOB and lower extremity edema. Patient was admitted to Medicine service, and Nephrology consulted for hemodialysis.     Hospital Course with Pertinent Findings     Patient tolerated procedure. Had and episode of hypotension during dialysis. BP normalized post dialysis.Total UF 1000 ml. Unable to remove ordered 2L per nephrology.     End-stage renal disease secondary to biopsy-proven IgA nephropathy. Recommends following with nephrology. Pt has issue with insurance.     Discharge physical exam:  Physical Exam  Constitutional:       Appearance: Normal appearance.   HENT:      Head: Normocephalic and atraumatic.   Eyes:      Extraocular Movements: Extraocular movements intact.      Pupils: Pupils are equal, round, and reactive to light.   Cardiovascular:      Rate and Rhythm: Normal rate and regular rhythm.      Pulses: Normal pulses.      Heart sounds: Normal heart sounds.   Abdominal:      General: Abdomen is flat. There is no distension.      Tenderness: There is no abdominal tenderness.   Musculoskeletal:         General: Normal range of motion.      Cervical back: Normal range of motion.   Skin:     General: Skin is  warm.   Neurological:      General: No focal deficit present.      Mental Status: He is alert and oriented to person, place, and time.   Psychiatric:         Mood and Affect: Mood normal.         Behavior: Behavior normal.       TIME SPENT ON DISCHARGE: 60 minutes    Discharge Medications        Medication List        START taking these medications      ondansetron 4 MG Tbdl  Commonly known as: ZOFRAN-ODT  Take 1 tablet (4 mg total) by mouth every 12 (twelve) hours as needed (for nausea).            CONTINUE taking these medications      amLODIPine 10 MG tablet  Commonly known as: NORVASC  Take 1 tablet (10 mg total) by mouth every evening.     atorvastatin 40 MG tablet  Commonly known as: LIPITOR  Take 1 tablet (40 mg total) by mouth once daily.     calcitRIOL 0.25 MCG Cap  Commonly known as: ROCALTROL  Take 1 capsule (0.25 mcg total) by mouth once daily.     carvediloL 25 MG tablet  Commonly known as: COREG  Take 1 tablet (25 mg total) by mouth 2 (two) times daily.     hydrALAZINE 100 MG tablet  Commonly known as: APRESOLINE  Take 1 tablet (100 mg total) by mouth 3 (three) times daily.     isosorbide dinitrate 20 MG tablet  Commonly known as: ISORDIL  Take 1 tablet (20 mg total) by mouth 3 (three) times daily.     losartan 50 MG tablet  Commonly known as: COZAAR  Take 1 tablet (50 mg total) by mouth once daily.     sevelamer carbonate 800 mg Tab  Commonly known as: RENVELA  Take 1 tablet (800 mg total) by mouth 3 (three) times daily with meals.               Where to Get Your Medications        These medications were sent to [address] [address]      Phone: [phone]   ondansetron 4 MG Tbdl         Discharge Information:     Patient is stable and ready for discharge. Continue home antihypertensives  amlodipine 10 nightly, Coreg 25 mg b.i.d., hydralazine 100 mg t.i.d., isosorbide dinitrate 20 mg t.i.d., losartan 50 mg  daily and other meds.  Letter to return to work and need to have medications with him on his trip to [place] given. Patient stated he doesn't want to risk having his medications thrown away by TSA.    Recommend follow up with Nephrology and Cardiology, pending patient's issue with insurance. ED protocol discussed with patient, patient verbalized understanding.    Felix Sweeney MD, MBS  [facility] Resident, [facility]

## 2023-10-09 NOTE — CONSULTS
[facility]  Nephrology Consultation    Patient Name: Ryann Ly  Age: 63 y.o.  : 1960  MRN: [id number]  Admission Date: 10/9/2023    Date of Consultation: 10/09/2023    Consultation Requested By: Dr Giraldo    Reason for Consultation: ESRD    Chief Complaint: Emergency Dialysis (Pt wants dialysis, last dialysis friday)      History of Present Illness:  Mr Ryann Ly is a 63 y.o.  male with past medical history of end-stage renal disease, hypertension, CKD MBD, and anemia of chronic disease.  Patient presents to the emergency department today with complaints of lower extremity edema and shortness of breath. Patient was admitted to medicine service, Nephrology was consulted for assistance with management of ESRD.    Patient has No Known Allergies.     Review of Systems   Constitutional: Negative.    HENT: Negative.     Eyes: Negative.    Respiratory:  Positive for shortness of breath.    Cardiovascular: Negative.    Gastrointestinal: Negative.    Endocrine: Negative.    Genitourinary: Negative.    Musculoskeletal: Negative.    Skin: Negative.    Allergic/Immunologic: Negative.    Neurological: Negative.    Hematological: Negative.    Psychiatric/Behavioral: Negative.       Past Medical History:  has a past medical history of Renal disorder.    Procedure History:  has a past surgical history that includes Insertion of tunneled central venous hemodialysis catheter (N/A, 2023).    Family History: family history is not on file.    Social History:  reports that he has never smoked. He has never used smokeless tobacco. He reports that he does not drink alcohol and does not use drugs.    Physical Exam:   BP (!) 173/99   Pulse 86   Temp 98.2 °F (36.8 °C) (Temporal)   Resp 18   Wt 48.9 kg (107 lb 11.1 oz)   SpO2 100%   BMI 19.70 kg/m²  Body mass index is 19.7 kg/m².  General appearance: Patient is in no acute distress.  Skin: No rashes or wounds.  HEENT: TAMERA, HOLLIS, no  scleral icterus, no JVD. Neck is supple.  Chest: Respirations are unlabored. Lungs sounds are clear.  Right chest wall tunneled dialysis catheter is in place  Heart: S1, S2.   Abdomen: Benign.  : Deferred.  Extremities:  Bilateral lower extremity pitting edema, peripheral pulses are palpable.   Neuro: No focal deficits.     Inpatient Medications:     Current Facility-Administered Medications:     dextrose 10% bolus 125 mL 125 mL, 12.5 g, Intravenous, PRN, Stroda, Austin, DO    dextrose 10% bolus 250 mL 250 mL, 25 g, Intravenous, PRN, Stroda, Austin, DO    glucagon (human recombinant) injection 1 mg, 1 mg, Intramuscular, PRN, Stroda, Austin, DO    glucose chewable tablet 16 g, 16 g, Oral, PRN, Stroda, Austin, DO    glucose chewable tablet 24 g, 24 g, Oral, PRN, Stroda, Austin, DO    naloxone 0.4 mg/mL injection 0.02 mg, 0.02 mg, Intravenous, PRN, Stroda, Austin, DO    ondansetron injection 4 mg, 4 mg, Intravenous, Q8H PRN, Stroda, Austin, DO    sodium chloride 0.9% flush 10 mL, 10 mL, Intravenous, Q12H PRN, Stroda, Austin, DO    Current Outpatient Medications:     amLODIPine (NORVASC) 10 MG tablet, Take 1 tablet (10 mg total) by mouth every evening., Disp: 90 tablet, Rfl: 3    atorvastatin (LIPITOR) 40 MG tablet, Take 1 tablet (40 mg total) by mouth once daily., Disp: 90 tablet, Rfl: 3    calcitRIOL (ROCALTROL) 0.25 MCG Cap, Take 1 capsule (0.25 mcg total) by mouth once daily., Disp: 90 capsule, Rfl: 3    carvediloL (COREG) 25 MG tablet, Take 1 tablet (25 mg total) by mouth 2 (two) times daily., Disp: 60 tablet, Rfl: 11    hydrALAZINE (APRESOLINE) 100 MG tablet, Take 1 tablet (100 mg total) by mouth 3 (three) times daily., Disp: 90 tablet, Rfl: 0    isosorbide dinitrate (ISORDIL) 20 MG tablet, Take 1 tablet (20 mg total) by mouth 3 (three) times daily., Disp: 90 tablet, Rfl: 11    losartan (COZAAR) 50 MG tablet, Take 1 tablet (50 mg total) by mouth once daily., Disp: 30 tablet, Rfl: 0    sevelamer carbonate (RENVELA) 800 mg Tab,  Take 1 tablet (800 mg total) by mouth 3 (three) times daily with meals., Disp: 90 tablet, Rfl: 11     Imaging:  Reviewed    Laboratory Data:   Serum  Lab Results   Component Value Date    WBC 8.73 10/09/2023    HGB 11.7 (L) 10/09/2023    HCT 36.9 (L) 10/09/2023     10/09/2023    IRON 63 (L) 09/11/2023    TIBC 197 (L) 09/11/2023    TRANS 167 (L) 09/11/2023    LABIRON 32 09/11/2023    FERRITIN 487.55 (H) 09/11/2023    FOLATE 11.3 10/04/2023    YYLOLFMH52 882 (H) 10/03/2023    HAPTO 204 09/11/2023     (L) 10/09/2023    K 4.4 10/09/2023    CHLORIDE 95 (L) 10/09/2023    CO2 24 10/09/2023    BUN 61.0 (H) 10/09/2023    CREATININE 8.90 (H) 10/09/2023    EGFRNORACEVR 6 10/09/2023    GLUCOSE 130 (H) 10/09/2023    CALCIUM 9.0 10/09/2023    ALKPHOS 56 10/09/2023    LABPROT 6.9 10/09/2023    ALBUMIN 3.2 (L) 10/09/2023    AST 8 10/09/2023    ALT 11 10/09/2023    MG 2.50 10/09/2023    PHOS 6.5 (H) 10/05/2023      Lab Results   Component Value Date    HGBA1C 5.6 09/12/2023    .7 (H) 10/05/2023    RXDQHGCF46FJ 18.9 (L) 09/15/2023    CANCA Negative 09/11/2023    PANCA Negative 09/11/2023    HIV Nonreactive 09/16/2023    HEPCAB Nonreactive 09/15/2023    HEPBSURFAG Nonreactive 09/12/2023    HEPBSAB Nonreactive 09/12/2023    MSPIKEPCT  09/16/2023      Comment:      Not observed     Urine:  Lab Results   Component Value Date    COLORUA Yellow 10/01/2023    APPEARANCEUA Clear 10/01/2023    SGUA 1.012 10/01/2023    PHUA 8.0 10/01/2023    PROTEINUA 4+ (A) 10/01/2023    GLUCOSEUA Negative 10/01/2023    KETONESUA Negative 10/01/2023    BLOODUA Trace (A) 10/01/2023    NITRITESUA Negative 10/01/2023    LEUKOCYTESUR Negative 10/01/2023    RBCUA 11 (H) 10/01/2023    WBCUA <5 10/01/2023    BACTERIA None Seen 10/01/2023    SQEPUA None Seen 09/22/2023    HYALINECASTS None Seen 09/22/2023    CREATRANDUR 64.8 09/11/2023    PROTEINURINE 407.8 09/11/2023    UPROTCREA 6.3 09/11/2023     KIDNEY BIOPSY REPORT FROM [facility]  10/6/2023  PATHOLOGY NO:  [id number]   DIAGNOSIS: IGA NEPHROPATHY.  SEE COMMENT.       COMMENT:  THE [facility] CLASSIFICATION SCORE IS M1 E0 S1 T2 C0.  NOTE THAT ALTHOUGH   LAMBDA RESTRICTION CAN BE SEEN IN IGA NEPHROPATHY, CORRELATION WITH   ELECTROPHORESIS / IMMUNOFIXATION AND SERUM FREE LIGHT CHAIN STUDIES IS SUGGESTED   AS INDICATED TO EXCLUDE A CIRCULATING MONOCLONAL PROTEIN.       SEE TABLE BELOW FOR SUMMARY OF CHRONICITY FINDINGS:       CHRONICITY SUMMARY   TOTAL GLOMERULI-                            9   GLOBAL GLOMERULOSCLEROSIS-     6   SEGMENTAL SCLEROSIS-                  PRESENT   INTERSTITIAL FIBROSIS-                     SEVERE   TUBULAR ATROPHY-                          SEVERE   ARTERIAL INTIMAL FIBROSIS-             MILD   ARTERIOLAR HYALINOSIS-                 MODERATE       FINAL DIAGNOSIS PERFORMED BY   LENORE BLAS M.D.         Impression:   End-stage renal disease secondary to biopsy-proven IgA nephropathy  Volume overload  Hypertension  Anemia of chronic disease     Plan:   Will dialyze the patient today for uremic toxin clearance and ultrafiltration.  Goal fluid removal 2 L as tolerated.  There are no indications for ELISSA at present.  Continue home antihypertensive regimen.    Thank you very much for your consultation.     Breanna Webster NP  [facility]ph[facility]  10/9/2023 9:27 AM

## 2023-10-09 NOTE — ED PROVIDER NOTES
Encounter Date: 10/9/2023       History     Chief Complaint   Patient presents with    Emergency Dialysis     Pt wants dialysis, last dialysis friday     Patient here reporting his last successful dialysis was last Wednesday.  Patient without health insurance coverage, thus seeking dialysis in the emergency department.  Today he is endorsing worsening dyspnea, lower extremity edema.  He reports he is hoping to achieve dialysis once more, before moving to [place] to stay with family members next week.        Review of patient's allergies indicates:  No Known Allergies  Past Medical History:   Diagnosis Date    Renal disorder      Past Surgical History:   Procedure Laterality Date    INSERTION OF TUNNELED CENTRAL VENOUS HEMODIALYSIS CATHETER N/A 9/19/2023    Procedure: Insertion, Catheter, Central Venous, Hemodialysis;  Surgeon: Theoodre Benitez MD;  Location: [facility] CATH LAB;  Service: Peripheral Vascular;  Laterality: N/A;     No family history on file.  Social History     Tobacco Use    Smoking status: Never    Smokeless tobacco: Never   Substance Use Topics    Alcohol use: Never    Drug use: Never     Review of Systems   Reason unable to perform ROS: As described in HPI.       Physical Exam     Initial Vitals [10/09/23 0718]   BP Pulse Resp Temp SpO2   (!) 173/99 86 18 98.2 °F (36.8 °C) 100 %      MAP       --         Physical Exam    Nursing note and vitals reviewed.  Constitutional: He appears well-developed and well-nourished. He is not diaphoretic. No distress.   HENT:   Head: Normocephalic and atraumatic.   Eyes: EOM are normal. Pupils are equal, round, and reactive to light. Right eye exhibits no discharge. Left eye exhibits no discharge.   Neck: Neck supple. No thyromegaly present. No tracheal deviation present. No JVD present.   Normal range of motion.  Cardiovascular:  Normal rate, regular rhythm, normal heart sounds and intact distal pulses.           No murmur heard.  Pulmonary/Chest: No stridor. No  respiratory distress. He has no wheezes. He has no rhonchi. He has rales.   Abdominal: Abdomen is soft. He exhibits no distension. There is no abdominal tenderness. There is no rebound and no guarding.   Musculoskeletal:         General: Edema present. No tenderness. Normal range of motion.      Cervical back: Normal range of motion and neck supple.     Neurological: He is alert and oriented to person, place, and time. He has normal strength. No cranial nerve deficit. GCS score is 15. GCS eye subscore is 4. GCS verbal subscore is 5. GCS motor subscore is 6.   Skin: Skin is warm and dry. Capillary refill takes less than 2 seconds. No rash and no abscess noted. No erythema. No pallor.   Psychiatric: He has a normal mood and affect. His behavior is normal. Judgment and thought content normal.         ED Course   Procedures  Labs Reviewed   COMPREHENSIVE METABOLIC PANEL - Abnormal; Notable for the following components:       Result Value    Sodium Level 131 (*)     Chloride 95 (*)     Glucose Level 130 (*)     Blood Urea Nitrogen 61.0 (*)     Creatinine 8.90 (*)     Albumin Level 3.2 (*)     Globulin 3.7 (*)     Albumin/Globulin Ratio 0.9 (*)     All other components within normal limits   B-TYPE NATRIURETIC PEPTIDE - Abnormal; Notable for the following components:    Natriuretic Peptide 138.9 (*)     All other components within normal limits   CBC WITH DIFFERENTIAL - Abnormal; Notable for the following components:    Hgb 11.7 (*)     Hct 36.9 (*)     MCV 76.9 (*)     MCH 24.4 (*)     MCHC 31.7 (*)     IG# 0.05 (*)     All other components within normal limits   TROPONIN I - Normal   MAGNESIUM - Normal   CBC W/ AUTO DIFFERENTIAL    Narrative:     The following orders were created for panel order CBC auto differential.  Procedure                               Abnormality         Status                     ---------                               -----------         ------                     CBC with  Differential[[id number]]       Abnormal            Final result                 Please view results for these tests on the individual orders.   EXTRA TUBES    Narrative:     The following orders were created for panel order EXTRA TUBES.  Procedure                               Abnormality         Status                     ---------                               -----------         ------                     Light Blue Top Hold[[id number]]                             In process                 Gold Top Hold[[id number]]                                   In process                 Pink Top Hold[[id number]]                                   In process                   Please view results for these tests on the individual orders.   LIGHT BLUE TOP HOLD   GOLD TOP HOLD   PINK TOP HOLD     EKG Readings: (Independently Interpreted)   - NSR @ 78, non acute and non ischemic appearing ;     ECG Results              EKG 12-lead (In process)  Result time 10/09/23 08:16:33      In process by Interface, Lab In [facility] (10/09/23 08:16:33)                   Narrative:    Test Reason : Z13.9,    Vent. Rate : 078 BPM     Atrial Rate : 078 BPM     P-R Int : 166 ms          QRS Dur : 142 ms      QT Int : 426 ms       P-R-T Axes : 067 080 -02 degrees     QTc Int : 485 ms    Normal sinus rhythm  Nonspecific intraventricular block  Cannot rule out Anterior infarct ,age undetermined  T wave abnormality, consider inferolateral ischemia  Abnormal ECG  When compared with ECG of 03-OCT-2023 21:18,  T wave inversion less evident in Lateral leads    Referred By: [email or web address]   SELF           Confirmed By:                                   Imaging Results              X-Ray Chest AP Portable (Final result)  Result time 10/09/23 08:29:21      Final result by Vincenzo Winn MD (10/09/23 08:29:21)                   Impression:      No acute cardiopulmonary process.      Electronically signed by: Vincenzo Winn  Date:    10/09/2023  Time:    08:29                Narrative:    EXAMINATION:  XR CHEST AP PORTABLE    CLINICAL HISTORY:  Encounter for screening, unspecified    TECHNIQUE:  Single view of the chest    COMPARISON:  10/04/2023    FINDINGS:  No focal opacification, pleural effusion, or pneumothorax.    The cardiomediastinal silhouette is within normal limits.    Tunneled right-sided hemodialysis catheter projects over the right atrium.    No acute osseous abnormality.                                    X-Rays:   Independently Interpreted Readings:   Other Readings:  - CXR without acute abnormal finding ;    Medications   sodium chloride 0.9% flush 10 mL (has no administration in time range)   naloxone 0.4 mg/mL injection 0.02 mg (has no administration in time range)   glucose chewable tablet 16 g (has no administration in time range)   glucose chewable tablet 24 g (has no administration in time range)   glucagon (human recombinant) injection 1 mg (has no administration in time range)   ondansetron injection 4 mg (has no administration in time range)   dextrose 10% bolus 125 mL 125 mL (has no administration in time range)   dextrose 10% bolus 250 mL 250 mL (has no administration in time range)     Medical Decision Making  63-year-old male without ongoing connections for outpatient hemodialysis though with known end-stage renal disease on hemodialysis presents to the emergency department endorsing symptoms compatible with volume overload.    Amount and/or Complexity of Data Reviewed  External Data Reviewed: labs, radiology and notes.  Labs: ordered. Decision-making details documented in ED Course.  Radiology: ordered and independent interpretation performed. Decision-making details documented in ED Course.     Details: - CXR without acute abnormal finding ;  ECG/medicine tests: ordered and independent interpretation performed. Decision-making details documented in ED Course.     Details: - NSR @ 78, non acute and non ischemic appearing ;  Discussion of  management or test interpretation with external provider(s): Inpatient medicine team aware of case, plan admit    Risk  Decision regarding hospitalization.  Risk Details: Risk found sufficient to warrant admission for further evaluation, probable hemodialysis, clinical supervision of course.               ED Course as of 10/09/23 0949   Mon Oct 09, 2023   0810 Reassuring hemogram ; [CT]   0825 Modestly elevated bnp ; [CT]   0825 Troponin negative ; [CT]      ED Course User Index  [CT] Michele Erazo MD                    Clinical Impression:   Final diagnoses:  [Z13.9] Screening due  [E87.70] Hypervolemia, unspecified hypervolemia type (Primary)        ED Disposition Condition    Observation                 Michele Erazo MD  10/09/23 0949

## 2023-10-09 NOTE — H&P
History and Physical     Date of Admit: 10/9/2023  Current Hospital Day: 1     Subjective:      Brief HPI:  Ryann Ly is a 63 y.o. male who  has a past medical history of ESRD on hemodialysis, hypertension.  He presented to [facility] ED on 10/9/2023  with a primary complaint of needing hemodialysis.  Patient is reporting some nausea.  Denies any chest pain, abdominal pain, vomiting/diarrhea, shortness of breath, fever.  Was last dialyzed 10/4 at [facility].    10/1 was admitted to [facility] with mental status changes and weakness.  He had a kidney biopsy 10/4.  Neurology was also consulted at that time for intermittent encephalopathy for the past month he underwent lumbar puncture on 10/04 meningoencephalitis panel negative.  Syphilis negative, B12 high, folate normal.  EEG showed moderate to severe cerebral dysfunction nonspecific sign but no epileptiform discharges.  His mentation improved by the time of 10/5 which was his date of discharge.    Renal biopsy pathology interpretation as follows:   IGA NEPHROPATHY.  SEE COMMENT.      COMMENT:  THE [facility] CLASSIFICATION SCORE IS M1 E0 S1 T2 C0.  NOTE THAT ALTHOUGH   LAMBDA RESTRICTION CAN BE SEEN IN IGA NEPHROPATHY, CORRELATION WITH   ELECTROPHORESIS / IMMUNOFIXATION AND SERUM FREE LIGHT CHAIN STUDIES IS SUGGESTED   AS INDICATED TO EXCLUDE A CIRCULATING MONOCLONAL PROTEIN.       SEE TABLE BELOW FOR SUMMARY OF CHRONICITY FINDINGS:       CHRONICITY SUMMARY   TOTAL GLOMERULI-                            9   GLOBAL GLOMERULOSCLEROSIS-     6   SEGMENTAL SCLEROSIS-                  PRESENT   INTERSTITIAL FIBROSIS-                     SEVERE   TUBULAR ATROPHY-                          SEVERE   ARTERIAL INTIMAL FIBROSIS-             MILD   ARTERIOLAR HYALINOSIS-                 MODERATE     Past Medical History:   Diagnosis Date    Renal disorder        Past Surgical History:   Procedure Laterality Date    INSERTION OF TUNNELED CENTRAL VENOUS HEMODIALYSIS CATHETER N/A 9/19/2023     Procedure: Insertion, Catheter, Central Venous, Hemodialysis;  Surgeon: Theodore Benitez MD;  Location: [facility] CATH LAB;  Service: Peripheral Vascular;  Laterality: N/A;       Review of patient's allergies indicates:  No Known Allergies    Current Outpatient Medications   Medication Instructions    amLODIPine (NORVASC) 10 mg, Oral, Nightly    atorvastatin (LIPITOR) 40 mg, Oral, Daily    calcitRIOL (ROCALTROL) 0.25 mcg, Oral, Daily    carvediloL (COREG) 25 mg, Oral, 2 times daily    hydrALAZINE (APRESOLINE) 100 mg, Oral, 3 times daily    isosorbide dinitrate (ISORDIL) 20 mg, Oral, 3 times daily    losartan (COZAAR) 50 mg, Oral, Daily    ondansetron (ZOFRAN-ODT) 4 mg, Oral, Every 12 hours PRN    sevelamer carbonate (RENVELA) 800 mg, Oral, 3 times daily with meals        Family History    None         Tobacco Use    Smoking status: Never    Smokeless tobacco: Never   Substance and Sexual Activity    Alcohol use: Never    Drug use: Never    Sexual activity: Not on file        Review of Systems:  Review of Systems   Constitutional:  Negative for chills and fever.        Need for hemodialysis   Respiratory:  Negative for cough, hemoptysis and shortness of breath.    Cardiovascular:  Negative for chest pain and palpitations.   Gastrointestinal:  Positive for nausea. Negative for abdominal pain, diarrhea and vomiting.   Genitourinary:  Negative for flank pain and hematuria.   Skin:  Negative for itching and rash.   Neurological:  Negative for weakness and headaches.   Endo/Heme/Allergies:  Does not bruise/bleed easily.          Objective:     Vital Signs:  Vital Signs (Most Recent):  Temp: 98.2 °F (36.8 °C) (10/09/23 0718)  Pulse: 86 (10/09/23 0718)  Resp: 18 (10/09/23 0718)  BP: (!) 173/99 (10/09/23 0718)  SpO2: 100 % (10/09/23 0718) Vital Signs (24h Range):  Temp:  [98.2 °F (36.8 °C)] 98.2 °F (36.8 °C)  Pulse:  [86] 86  Resp:  [18] 18  SpO2:  [100 %] 100 %  BP: (173)/(99) 173/99   Body mass index is 19.7 kg/m².   No intake or  "output data in the 24 hours ending 10/09/23 1003    Physical Examination:  General:  Well developed, well nourished, no acute distress  Head: Normocephalic, atraumatic  ENT: PERRL, MMM.  Neck: supple, normal ROM, no JVD  CVS:  RRR. S1 and S2 normal. No murmurs, rubs, or gallops. Regular peripheral pulses.  1+ peripheral edema to right lower extremity, trace edema to the left.  Resp:  Lungs clear to auscultation bilaterally, no wheezes, rales, or rhonchi. Normal respiratory effort.  GI:  Abdomen soft, non-tender, non-distended, normoactive bowel sounds  MSK:  Full range of motion, no obvious deformities  Skin:  No rashes, ulcers, erythema  Neuro:  Alert and oriented x3, No focal neuro deficits, CNII-XII grossly intact.  Tunneled dialysis port to right chest wall      Laboratory:    Recent Labs   Lab 10/09/23  0753   WBC 8.73   HGB 11.7*   HCT 36.9*      MCV 76.9*   RDW 14.2     Recent Labs   Lab 10/09/23  0753   TROPONINI 0.039   .9*     Recent Labs   Lab 10/09/23  0753   TROPONINI 0.039   .9*     No results for input(s): "CHOL", "HDL", "LDLCALC", "TRIG", "CHOLHDL" in the last 168 hours. Recent Labs   Lab 10/09/23  0753   *   K 4.4   CO2 24   BUN 61.0*   CREATININE 8.90*   CALCIUM 9.0   MG 2.50     Recent Labs   Lab 10/09/23  0753   ALBUMIN 3.2*   BILITOT 0.5   AST 8   ALKPHOS 56   ALT 11     Recent Labs   Lab 10/03/23  0420 10/04/23  0516   [id number]*  --    FOLATE  --  11.3     No results for input(s): "TSH", "K1HAWPX", "HGBA1C", "INR", "PROTIME", "PTT" in the last 168 hours.       Microbiology Data:  Microbiology Results (last 7 days)       ** No results found for the last 168 hours. **             Other Results:    Radiology:  Imaging Results              X-Ray Chest AP Portable (Final result)  Result time 10/09/23 08:29:21      Final result by Vincenzo Winn MD (10/09/23 08:29:21)                   Impression:      No acute cardiopulmonary process.      Electronically signed " by: Vincenzo Winn  Date:    10/09/2023  Time:    08:29               Narrative:    EXAMINATION:  XR CHEST AP PORTABLE    CLINICAL HISTORY:  Encounter for screening, unspecified    TECHNIQUE:  Single view of the chest    COMPARISON:  10/04/2023    FINDINGS:  No focal opacification, pleural effusion, or pneumothorax.    The cardiomediastinal silhouette is within normal limits.    Tunneled right-sided hemodialysis catheter projects over the right atrium.    No acute osseous abnormality.                                    No results found in the last 24 hours.     Current Medications:     Infusions:        Scheduled:        PRN:       Antibiotics and Day Number of Therapy:  Antibiotics (From admission, onward)      None                 Assessment & Plan:     ESRD on HD  IgA nephropathy  Anemia of chronic disease  - continue home Renvela, calcitriol and antihypertensive medications.  - nephrology consulted for hemodialysis.  - HD port right chest wall  - likely will be able to discharge home after receiving hemodialysis      HTN  - patient did not take home medications today of hydralazine and losartan  - we will restart home medications and watch blood pressure.  - amlodipine 10 nightly, Coreg 25 mg b.i.d., hydralazine 100 mg t.i.d., isosorbide dinitrate 20 mg t.i.d., losartan 50 mg daily.      Nausea  Zofran p.r.n.    CODE STATUS: full  Diet: renal on dialysis  DVT Prophylaxis: scd  GI Prophylaxis: n/a  Fluids: po      Disposition: stable, admitted for HD.          Austin Giraldo DO  Internal Medicine Resident PGY-II      Attending addendum to follow

## 2023-10-10 ENCOUNTER — OFFICE VISIT (OUTPATIENT)
Dept: VASCULAR SURGERY | Facility: CLINIC | Age: 63
End: 2023-10-10

## 2023-10-10 VITALS
SYSTOLIC BLOOD PRESSURE: 169 MMHG | RESPIRATION RATE: 19 BRPM | DIASTOLIC BLOOD PRESSURE: 87 MMHG | HEIGHT: 62 IN | BODY MASS INDEX: 20.06 KG/M2 | HEART RATE: 90 BPM | OXYGEN SATURATION: 100 % | WEIGHT: 109 LBS | TEMPERATURE: 98 F

## 2023-10-10 DIAGNOSIS — N18.6 ESRD (END STAGE RENAL DISEASE) ON DIALYSIS: ICD-10-CM

## 2023-10-10 DIAGNOSIS — Z99.2 ESRD (END STAGE RENAL DISEASE) ON DIALYSIS: ICD-10-CM

## 2023-10-10 PROCEDURE — 99215 OFFICE O/P EST HI 40 MIN: CPT | Mod: PBBFAC

## 2023-10-10 NOTE — PROGRESS NOTES
Patient here for consult for permanent dialysis access but when questioned he said he is moving back to [place] tomorrow, where he has family.  He does not want to have surgery here.  Reported this to Dr. Benitez, Dr. Arnold and Dr. Jj.  Dr. Arnold saw patient and instructed him on process that his dialysis center will get him associated with a dialysis center in [place] and they will assume his care and refer him to a surgeon to get access.   for [place] used. Int.#[id number].

## 2023-10-10 NOTE — PROGRESS NOTES
[facility] GENERAL SURGERY   Clinic Note       CC: Evaluation for hemodialysis access       HPI:   Ryann Ly is a 63 y.o. male with PMHx of ESRD on HD who is known to the surgical service. He underwent RIJ tunneled HD catheter placement on 9/19/2023. He presents to clinic today for evaluation for hemodialysis access. He reports he is moving to [place] to be with his family and desires all of his further care there. He is already set up for HD in [place]. He will need to be referred to a Vascular surgeon in [place] for HD access creation. Interview performed with Norwegian .       Physical Exam:   Vitals:    10/10/23 1606   BP: (!) 169/87   Pulse: 90   Resp: 19   Temp: 97.5 °F (36.4 °C)      Gen:  NAD, AAOx3   Eye: DEMARCO, EOMI   CVS:  Normal RRR   Vasc: RIJ tunneled catheter in place c/d/I with no erythema or induration  Chest: Non-labored breathing on room air   Abd:  s/nt/nd   Ext:  Move all 4 extremities.     Assessment/Plan:   Ryann Ly is a 63 y.o. male with PMHx of ESRD on HD via RIJ tunneled catheter       - Patient will be moving to [place] and will have all of his further care there  - He is already set up for HD in [place] and will need to see a Vascular surgeon there for HD access creation      Heena Reza MD  [facility] General Surgery, PGY5  10/10/2023 4:59 PM

## 2023-10-18 NOTE — PROGRESS NOTES
I have reviewed and concur with the resident's history, physical, assessment, and plan.  I have discussed with him all issues related to the diagnosis, workup and treatment plan. Care provided as reasonable and necessary.     Kuldeep Hilliard MD  [facility]

## 2023-10-22 NOTE — DISCHARGE SUMMARY
[facility]  Hospital Medicine Discharge Summary    Admit Date: 10/1/2023  Discharge Date and Time: 10/6/23  Admitting Physician:  Team  Discharging Physician: Yadi Forde MD.  Primary Care Physician: Deb, Primary Doctor  Consults: Nephrology and Neurology    Discharge Diagnoses:  Acute metabolic Encephalopathy x1 month predates starting dialysis  Acute kidney injury versus ESRD requiring HD  Nephrotic range proteinuria  Fall with head trauma    History of: Recently started on HD, hypertension    Hospital Course:   63 y.o. male with a history that includes ESRD on HD; presents to the [facility] ED on 10/1/2023  with mental status changes and weakness. He reported ghaving HD on 9/30 at [facility].  It was reported he had N/V and after treatment fell, hit his head on the concrete outside of the hospital after discharge.  He was brought back to the ED at [facility] where he had a right parietal laceration that was repaired; he was the discharged home.   Patient reported mental status changes and confusion since then. Evaluation in ED noted patient HDS, stable on room air. Workup included negative CT head and MRI brain.  BUN was 42, Cr 8.8.  Patient admitted to hospital medicine services, Nephrology consulted.    MD spoke with Breanna SUBRAMANIAN nephrology from [facility] who is familiar with the patient and there was some issue about his memory even before dialysis as he kept repeating the same questions over and over every time they saw him.  Requested kidney biopsy which was performed here October 4th.    Neurology was also consulted for intermittent encephalopathy ongoing for about 1 month.  EEG ordered and lumbar puncture performed October 4th.  CSF was negative for infection, RBC 1000, WBC 2 79% lymphocyte, glucose 56, protein 30, meningoencephalitis panel negative.  Syphilis negative, B12 high, folate normal.  EEG showed moderate to severe cerebral dysfunction nonspecific sign but no epileptiform  "discharges.    Otherwise  he has cleared up and no signs of confusion.  Spoke to his wife as well and she plans to transfer him back to [place] with her.  Discussed that he will need to continue getting dialysis until cleared by Nephrology.  He is to go to the ER at [facility] and get evaluated twice weekly maybe Monday and Thursday.Will need follow-up on kidney biopsy    Pt was seen and examined on the day of discharge. Patient was stable on discharge,all questions were answered and emphasized the importance of follow ups.   Vitals:  VITAL SIGNS: 24 HRS MIN & MAX LAST   No data recorded 98.5 °F (36.9 °C)   No data recorded (!) 163/86   No data recorded  72   No data recorded 18   No data recorded 96 %       Physical Exam:  GENERAL: In no acute distress, afebrile  CHEST: Clear to auscultation bilaterally  HEART: S1, S2, no appreciable murmur  ABDOMEN: Soft, nontender, BS +  MSK: Warm, no lower extremity edema, no clubbing or cyanosis  NEUROLOGIC:  Alert and responding appropriately  used    Procedures Performed:see full chart    Significant Diagnostic Studies: See Full reports for all details    No results for input(s): "WBC", "RBC", "HGB", "HCT", "MCV", "MCH", "MCHC", "RDW", "PLT", "MPV", "GRAN", "LYMPH", "MONO", "BASO", "NRBC" in the last 168 hours.    No results for input(s): "NA", "K", "CL", "CO2", "ANIONGAP", "BUN", "CREATININE", "GLU", "CALCIUM", "PH", "MG", "ALBUMIN", "PROT", "ALKPHOS", "ALT", "AST", "BILITOT" in the last 168 hours.     Microbiology Results (last 7 days)       ** No results found for the last 168 hours. **             X-Ray Chest AP Portable  Narrative: EXAMINATION:  XR CHEST AP PORTABLE    CLINICAL HISTORY:  Encounter for screening, unspecified    TECHNIQUE:  Single view of the chest    COMPARISON:  10/04/2023    FINDINGS:  No focal opacification, pleural effusion, or pneumothorax.    The cardiomediastinal silhouette is within normal limits.    Tunneled right-sided hemodialysis catheter " projects over the right atrium.    No acute osseous abnormality.  Impression: No acute cardiopulmonary process.    Electronically signed by: Vincenzo Winn  Date:    10/09/2023  Time:    08:29         Medication List        START taking these medications      losartan 50 MG tablet  Commonly known as: COZAAR  Take 1 tablet (50 mg total) by mouth once daily.            CHANGE how you take these medications      calcitRIOL 0.25 MCG Cap  Commonly known as: ROCALTROL  Take 1 capsule (0.25 mcg total) by mouth once daily.  What changed: when to take this     hydrALAZINE 100 MG tablet  Commonly known as: APRESOLINE  Take 1 tablet (100 mg total) by mouth 3 (three) times daily.  What changed:   medication strength  how much to take            CONTINUE taking these medications      amLODIPine 10 MG tablet  Commonly known as: NORVASC  Take 1 tablet (10 mg total) by mouth every evening.     atorvastatin 40 MG tablet  Commonly known as: LIPITOR  Take 1 tablet (40 mg total) by mouth once daily.     carvediloL 25 MG tablet  Commonly known as: COREG  Take 1 tablet (25 mg total) by mouth 2 (two) times daily.     isosorbide dinitrate 20 MG tablet  Commonly known as: ISORDIL  Take 1 tablet (20 mg total) by mouth 3 (three) times daily.     sevelamer carbonate 800 mg Tab  Commonly known as: RENVELA  Take 1 tablet (800 mg total) by mouth 3 (three) times daily with meals.            STOP taking these medications      aspirin 81 MG Chew     baclofen 10 MG tablet  Commonly known as: LIORESAL     HYDROcodone-acetaminophen 5-325 mg per tablet  Commonly known as: NORCO     LIDOcaine 5 %  Commonly known as: LIDODERM               Where to Get Your Medications        These medications were sent to [facility]/pharmacy #[id number] - [place] - [address] [facility]  [address] [address]      Hours: 24-hours Phone: [phone]   calcitRIOL 0.25 MCG Cap  hydrALAZINE 100 MG tablet  losartan 50 MG tablet           Explained in detail to the patient about the discharge plan, medications, and follow-up visits. Pt understands and agrees with the treatment plan  Discharge Disposition: Home or Self Care   Discharged Condition: stable  Diet-    Medications Per DC med rec  Activities as tolerated   Follow-up Information       Brittany Hyman MD Follow up in 1 week(s).    Specialty: Nephrology  Why: A referral was sent for you to [facility] they will call you with an appointment  Contact information:  [address] [place], [place] [id number]  [phone]                           For further questions contact hospitalist office    Discharge time 33 minutes    For worsening symptoms, chest pain, shortness of breath, increased abdominal pain, high grade fever, stroke or stroke like symptoms, immediately go to the nearest Emergency Room or call 911 as soon as possible.      Yadi Booker M.D, on 10/21/2023. at 7:02 PM.

## 2023-10-24 LAB
AMPAR2 IGG CSF QL CBA IFA: NEGATIVE
AMPHIPHYSIN AB TITR CSF IF: NEGATIVE {TITER}
ANNOTATION COMMENT IMP: NORMAL
CASPR2 IGG CSF QL CBA IFA: NEGATIVE
CV2 IGG TITR CSF: NEGATIVE {TITER}
DPPX IGG CSF QL IF: NEGATIVE
GABABR IGG CSF QL CBA IFA: NEGATIVE
GAD65 AB CSF-SCNC: NORMAL NMOL/L
GFAP ALPHA IGG CSF QL IF: NEGATIVE
HU2 AB TITR CSF IF: NEGATIVE {TITER}
IMMUNOLOGIST REVIEW: NORMAL
LABORATORY COMMENT REPORT: NORMAL
LGI1 IGG CSF QL CBA IFA: NEGATIVE
M AGNA-1, CSF: NEGATIVE
M ANNA-1, CSF: NEGATIVE
M ANNA-3, CSF: NEGATIVE
M MGLUR1 AB IFA, CSF: NEGATIVE
NIF IGG CSF QL IF: NEGATIVE
NMDAR1 IGG CSF QL CBA IFA: NEGATIVE
PCA-1 AB TITR CSF: NEGATIVE {TITER}
PCA-2 AB TITR CSF: NEGATIVE {TITER}
PCA-TR AB TITR CSF IF: NEGATIVE {TITER}

## 2023-12-25 PROBLEM — N17.9 ARF (ACUTE RENAL FAILURE): Status: RESOLVED | Noted: 2023-09-20 | Resolved: 2023-12-25

## 2024-01-23 NOTE — PLAN OF CARE
Problem: Adult Inpatient Plan of Care  Goal: Plan of Care Review  Outcome: Ongoing, Progressing  Goal: Patient-Specific Goal (Individualized)  Outcome: Ongoing, Progressing  Goal: Absence of Hospital-Acquired Illness or Injury  Outcome: Ongoing, Progressing  Goal: Optimal Comfort and Wellbeing  Outcome: Ongoing, Progressing  Goal: Readiness for Transition of Care  Outcome: Ongoing, Progressing     Problem: Device-Related Complication Risk (Hemodialysis)  Goal: Safe, Effective Therapy Delivery  Outcome: Ongoing, Progressing     Problem: Hemodynamic Instability (Hemodialysis)  Goal: Effective Tissue Perfusion  Outcome: Ongoing, Progressing     Problem: Infection (Hemodialysis)  Goal: Absence of Infection Signs and Symptoms  Outcome: Ongoing, Progressing     Problem: Infection  Goal: Absence of Infection Signs and Symptoms  Outcome: Ongoing, Progressing     Problem: Fluid and Electrolyte Imbalance (Acute Kidney Injury/Impairment)  Goal: Fluid and Electrolyte Balance  Outcome: Ongoing, Progressing     Problem: Oral Intake Inadequate (Acute Kidney Injury/Impairment)  Goal: Optimal Nutrition Intake  Outcome: Ongoing, Progressing     Problem: Renal Function Impairment (Acute Kidney Injury/Impairment)  Goal: Effective Renal Function  Outcome: Ongoing, Progressing      66

## 2024-12-31 NOTE — CARE UPDATE
Discussed CMP results with Dr. Hyman (Nephrology) and the patient. Upon examination, patient is tachypneic but appears comfortable and reports improved SOB since this morning. Given persistently elevated BUN/Cr and acidosis, I discussed hemodialysis with the patient. He states he would like to continue medication before progressing to hemodialysis as he would like to further discuss HD with family. He also said when people start dialysis in [facility], they stay on it for life. We discussed the chance for recovery of renal function with temporary dialysis, but patient will still defer treatment with dialysis and is open to discussing again tomorrow after he talks to family.    Meaghan Goetz MD  [facility] Internal Medicine, HO-2  9/11/2023  8:29 PM    Medication:   Requested Prescriptions     Pending Prescriptions Disp Refills    ondansetron (ZOFRAN-ODT) 8 MG TBDP disintegrating tablet [Pharmacy Med Name: ONDANSETRON ODT 8 MG TABLET] 30 tablet 1     Sig: DISSOLVE 1/2 TABLET BY MOUTH EVERY 8 HOURS AS NEEDED FOR NAUSEA AND/OR VOMITING    ibuprofen (ADVIL;MOTRIN) 800 MG tablet [Pharmacy Med Name: IBUPROFEN 800 MG TABLET] 90 tablet      Sig: TAKE 1 TABLET BY MOUTH EVERY 8 HOURS AS NEEDED FOR PAIN        Last Filled:  08/12/2024 & 09/27/2024     Patient Phone Number: [phone] (home)     Last appt: 12/17/2024   Next appt: Visit date not found    Last OARRS:        No data to display

## 2025-01-30 NOTE — H&P
Nieves Woodsno at Dr. Hadley office called stating the patient was scheduled to have a ultrasound done on breast and biopsy but patient was on Aspirin 325mg and had not seen her cardiologist Dr. Diehl and she had to be off the medication for 5 days prior to having the biopsy so they were scheduling her to see cardiology and has appt 2/5 with Dr. Abdi and then would get her scheduled to see Dr. Arcos for breast biopsy and then move forward with surgery once she was cleared from cardiology and had biopsy and ultrasound.    [facility] - Emergency Dept  Pulmonary Critical Care Note    Patient Name: Ryann Ly  MRN: [id number]  Admission Date: 9/11/2023  Hospital Length of Stay: 0 days  Code Status: Full Code  Attending Provider: Raffaele Almodovar MD  Primary Care Provider: Deb, Primary Doctor     Subjective:     HPI:   62-year-old [place] male presented to the emergency department for episodes of shortness of breath occurring at night for the past week.  Patient states he has been having some productive cough with clearish-whitish sputum.  States he moved to the [facility] from [facility] about 3 years ago, reports having been seen at an urgent care about a year ago and been started on some unspecified medication for an unspecified problem although has not been taking any medication for some time.  He does report having some reflux symptoms.  Patient denies any chest pain, N/V/D, abdominal pain, leg swelling, dizziness/headache, palpitations, fever, chills, urinary symptoms, hematuria, hemoptysis. Pt states he still makes adequate urine.  Denies any known sick contacts. Despite lab abnormalities patient appears very comfortable, is asking how long he will be staying       In the ED BP was 230/130, with trop elevated at 0.298, BNP 1656. Normal lactate and TSH. Negative flu/covid.     HPI is limited secondary to language barrier,  was used.    Hospital Course/Significant events:  N/a    24 Hour Interval History:  N/a    History reviewed. No pertinent past medical history.    History reviewed. No pertinent surgical history.    Social History     Socioeconomic History    Marital status:    Tobacco Use    Smoking status: Never    Smokeless tobacco: Never   Substance and Sexual Activity    Alcohol use: Never    Drug use: Never           No current outpatient medications    Current Inpatient Medications   [START ON 9/12/2023] aspirin  81 mg Oral Daily    heparin (PORCINE)  60 Units/kg (Adjusted) Intravenous Once     pantoprazole  40 mg Oral QHS       Current Intravenous Infusions   heparin (porcine) in D5W      nitroGLYCERIN 10 mcg/min (09/11/23 1609)    sodium bicarbonate 100 mEq in dextrose 5 % and 0.45 % NaCl 1,000 mL infusion 100 mL/hr at 09/11/23 1607         Review of Systems   Constitutional:  Negative for chills and fever.   Respiratory:  Positive for cough, sputum production and shortness of breath. Negative for hemoptysis.    Cardiovascular:  Negative for chest pain, palpitations and leg swelling.   Gastrointestinal:  Negative for abdominal pain, diarrhea, nausea and vomiting.   Genitourinary:  Negative for dysuria, flank pain and hematuria.   Skin:  Negative for itching and rash.   Neurological:  Negative for weakness and headaches.   Endo/Heme/Allergies:  Does not bruise/bleed easily.          Objective:     No intake or output data in the 24 hours ending 09/11/23 1718      Vital Signs (Most Recent):  Temp: 97.7 °F (36.5 °C) (09/11/23 1117)  Pulse: 93 (09/11/23 1636)  Resp: 20 (09/11/23 1117)  BP: (!) 213/125 (09/11/23 1636)  SpO2: 96 % (09/11/23 1636)  Body mass index is 20.67 kg/m².  Weight: 51.3 kg (113 lb) Vital Signs (24h Range):  Temp:  [97.7 °F (36.5 °C)] 97.7 °F (36.5 °C)  Pulse:  [90-97] 93  Resp:  [20] 20  SpO2:  [96 %] 96 %  BP: (207-230)/(122-137) 213/125     Physical Exam  Constitutional:       General: He is not in acute distress.     Appearance: Normal appearance.   HENT:      Head: Normocephalic and atraumatic.      Mouth/Throat:      Mouth: Mucous membranes are moist.      Pharynx: Oropharynx is clear.   Cardiovascular:      Rate and Rhythm: Normal rate and regular rhythm.      Pulses: Normal pulses.   Pulmonary:      Effort: Pulmonary effort is normal. No respiratory distress.      Breath sounds: Rhonchi present.   Abdominal:      General: There is no distension.      Palpations: Abdomen is soft.      Tenderness: There is no abdominal tenderness. There is no guarding.   Musculoskeletal:          General: Normal range of motion.      Right lower leg: No edema.      Left lower leg: No edema.   Skin:     General: Skin is warm and dry.   Neurological:      Mental Status: He is alert and oriented to person, place, and time. Mental status is at baseline.   Psychiatric:         Mood and Affect: Mood normal.         Behavior: Behavior normal.           Lines/Drains/Airways       Peripheral Intravenous Line  Duration                  Peripheral IV - Single Lumen 09/11/23 1431 20 G Right Antecubital <1 day         Peripheral IV - Single Lumen 09/11/23 1608 20 G Left Antecubital <1 day                    Significant Labs:    Lab Results   Component Value Date    WBC 6.65 09/11/2023    HGB 9.1 (L) 09/11/2023    HCT 28.6 (L) 09/11/2023    MCV 76.7 (L) 09/11/2023     09/11/2023           BMP  Lab Results   Component Value Date     09/11/2023    K 5.4 (H) 09/11/2023    CHLORIDE 108 (H) 09/11/2023    CO2 14 (L) 09/11/2023    .3 (H) 09/11/2023    CREATININE 12.11 (H) 09/11/2023    CALCIUM 7.8 (L) 09/11/2023         ABG  Recent Labs   Lab 09/11/23  1634   PH 7.350   PO2 61.0*   PCO2 27.0*   HCO3 14.9*   POCBASEDEF -9.60*       Mechanical Ventilation Support:         Significant Imaging:  I have reviewed the pertinent imaging within the past 24 hours.    CXR - Ill-defined right perihilar/basilar infiltrate  CT chest w/o - central vascular congestion, developing pulmonary edema.  Small bilateral pleural effusions.    Assessment/Plan:     Assessment   New onset NSTEMI  New onset CHF  Acute renal failure  Hyperkalemia  Metabolic acidosis with a gap of 15.  Microcytic anemia      Plan  Admit to ICU for hypertensive emergency for nitro drip  Consult Cardiology for the above, appreciate recommendations.  Likely will be getting started on GDMT  Consult Nephrology, appreciate recommendations.    We will hold off on initiating dialysis at this time as we will attempt to rehydrate and correct the acidosis with  half-normal and bicarb.  Patient is agreeable to getting dialyzed if necessary  TTE ordered, trend troponins.  Half-normal saline with 2 amps of bicarb  Lokelma for hyperkalemia  Retroperitoneal ultrasound showing nonspecific changes potentially suggesting chronic kidney disease, simple bilateral cortical cysts.  Obtaining renal artery and vein Doppler in the morning, will need to be NPO at midnight.      -BP on admission: (!) 230/137  -Lactic Acid: 0.9  -Cardiac monitor,  strict Is & Os  -IV Nitroglycerin with goal titration of 25% decrease in blood pressure within the 1st hour and a goal of < 160/100 within the next 6 hours  -Switch to p.o. medications after 24 hours provided BP has remained well controlled     DVT Prophylaxis:  Heparin GTT for ACS  GI Prophylaxis:  Protonix p.o.     32 minutes of critical care was time spent personally by me on the following activities: development of treatment plan with patient or surrogate and bedside caregivers, discussions with consultants, evaluation of patient's response to treatment, examination of patient, ordering and performing treatments and interventions, ordering and review of laboratory studies, ordering and review of radiographic studies, pulse oximetry, re-evaluation of patient's condition.  This critical care time did not overlap with that of any other provider or involve time for any procedures.     Austin Giraldo DO  Pulmonary Critical Care Medicine  [facility] Emergency Dept

## (undated) DEVICE — GUIDEWIRE STD .035X180CM ANG

## (undated) DEVICE — SUT MONO 3-0 PS-2 18 PLST

## (undated) DEVICE — COVER CIV-FLEX PROBE US 58IN

## (undated) DEVICE — Device

## (undated) DEVICE — DRESSING TEGADERM CHG 3.5X4.5

## (undated) DEVICE — SUT 2-0 ETHILON 18 FS

## (undated) DEVICE — ADHESIVE DERMABOND ADVANCED